# Patient Record
Sex: FEMALE | Race: WHITE | NOT HISPANIC OR LATINO | Employment: FULL TIME | URBAN - METROPOLITAN AREA
[De-identification: names, ages, dates, MRNs, and addresses within clinical notes are randomized per-mention and may not be internally consistent; named-entity substitution may affect disease eponyms.]

---

## 2017-01-10 ENCOUNTER — HOSPITAL ENCOUNTER (OUTPATIENT)
Dept: MAMMOGRAPHY | Facility: CLINIC | Age: 62
Discharge: HOME | End: 2017-01-10
Attending: INTERNAL MEDICINE

## 2017-01-10 ENCOUNTER — HOSPITAL ENCOUNTER (OUTPATIENT)
Dept: CT IMAGING | Facility: CLINIC | Age: 62
Discharge: HOME | End: 2017-01-10
Attending: INTERNAL MEDICINE

## 2017-01-10 DIAGNOSIS — Z12.2 ENCOUNTER FOR SCREENING FOR LUNG CANCER: ICD-10-CM

## 2017-01-10 DIAGNOSIS — Z12.31 VISIT FOR SCREENING MAMMOGRAM: ICD-10-CM

## 2017-01-10 DIAGNOSIS — Z72.0 TOBACCO ABUSE: ICD-10-CM

## 2017-01-11 ENCOUNTER — AMBULATORY - HEALTHEAST (OUTPATIENT)
Dept: INTERNAL MEDICINE | Facility: CLINIC | Age: 62
End: 2017-01-11

## 2017-01-11 DIAGNOSIS — R91.1 PULMONARY NODULE: ICD-10-CM

## 2017-03-16 ENCOUNTER — COMMUNICATION - HEALTHEAST (OUTPATIENT)
Dept: INTERNAL MEDICINE | Facility: CLINIC | Age: 62
End: 2017-03-16

## 2017-05-17 ENCOUNTER — COMMUNICATION - HEALTHEAST (OUTPATIENT)
Dept: INTERNAL MEDICINE | Facility: CLINIC | Age: 62
End: 2017-05-17

## 2017-05-31 ENCOUNTER — OFFICE VISIT - HEALTHEAST (OUTPATIENT)
Dept: INTERNAL MEDICINE | Facility: CLINIC | Age: 62
End: 2017-05-31

## 2017-05-31 DIAGNOSIS — H91.90 DEAF, UNSPECIFIED LATERALITY: ICD-10-CM

## 2017-05-31 DIAGNOSIS — R91.1 LUNG NODULE, SOLITARY: ICD-10-CM

## 2017-05-31 DIAGNOSIS — R19.7 DIARRHEA, UNSPECIFIED TYPE: ICD-10-CM

## 2017-05-31 DIAGNOSIS — J41.0 SIMPLE CHRONIC BRONCHITIS (H): ICD-10-CM

## 2017-05-31 DIAGNOSIS — Z72.0 TOBACCO ABUSE: ICD-10-CM

## 2017-05-31 ASSESSMENT — MIFFLIN-ST. JEOR: SCORE: 1118.75

## 2017-06-06 ENCOUNTER — HOSPITAL ENCOUNTER (OUTPATIENT)
Dept: CT IMAGING | Facility: CLINIC | Age: 62
Discharge: HOME | End: 2017-06-06
Attending: INTERNAL MEDICINE

## 2017-06-06 DIAGNOSIS — R91.1 LUNG NODULE, SOLITARY: ICD-10-CM

## 2017-06-07 ENCOUNTER — COMMUNICATION - HEALTHEAST (OUTPATIENT)
Dept: INTERNAL MEDICINE | Facility: CLINIC | Age: 62
End: 2017-06-07

## 2017-06-12 ENCOUNTER — COMMUNICATION - HEALTHEAST (OUTPATIENT)
Dept: INTERNAL MEDICINE | Facility: CLINIC | Age: 62
End: 2017-06-12

## 2017-06-26 ENCOUNTER — OFFICE VISIT - HEALTHEAST (OUTPATIENT)
Dept: PULMONOLOGY | Facility: OTHER | Age: 62
End: 2017-06-26

## 2017-06-26 DIAGNOSIS — R06.00 DYSPNEA, UNSPECIFIED TYPE: ICD-10-CM

## 2017-06-26 DIAGNOSIS — R91.1 LUNG NODULE: ICD-10-CM

## 2017-06-26 ASSESSMENT — MIFFLIN-ST. JEOR: SCORE: 1132.81

## 2017-10-17 ENCOUNTER — COMMUNICATION - HEALTHEAST (OUTPATIENT)
Dept: INTERNAL MEDICINE | Facility: CLINIC | Age: 62
End: 2017-10-17

## 2017-10-17 DIAGNOSIS — J30.9 ALLERGIC RHINITIS: ICD-10-CM

## 2018-05-22 ENCOUNTER — COMMUNICATION - HEALTHEAST (OUTPATIENT)
Dept: PULMONOLOGY | Facility: OTHER | Age: 63
End: 2018-05-22

## 2018-05-29 ENCOUNTER — COMMUNICATION - HEALTHEAST (OUTPATIENT)
Dept: INTERNAL MEDICINE | Facility: CLINIC | Age: 63
End: 2018-05-29

## 2018-05-29 ENCOUNTER — AMBULATORY - HEALTHEAST (OUTPATIENT)
Dept: INTERNAL MEDICINE | Facility: CLINIC | Age: 63
End: 2018-05-29

## 2018-05-29 ENCOUNTER — RECORDS - HEALTHEAST (OUTPATIENT)
Dept: ADMINISTRATIVE | Facility: OTHER | Age: 63
End: 2018-05-29

## 2018-05-29 ENCOUNTER — OFFICE VISIT - HEALTHEAST (OUTPATIENT)
Dept: INTERNAL MEDICINE | Facility: CLINIC | Age: 63
End: 2018-05-29

## 2018-05-29 DIAGNOSIS — E78.2 MIXED HYPERLIPIDEMIA: ICD-10-CM

## 2018-05-29 DIAGNOSIS — Z12.2 ENCOUNTER FOR SCREENING FOR LUNG CANCER: ICD-10-CM

## 2018-05-29 DIAGNOSIS — M85.80 OSTEOPENIA: ICD-10-CM

## 2018-05-29 DIAGNOSIS — H91.90 DEAFNESS, UNSPECIFIED LATERALITY: ICD-10-CM

## 2018-05-29 DIAGNOSIS — R42 VERTIGO: ICD-10-CM

## 2018-05-29 DIAGNOSIS — R91.8 LUNG NODULES: ICD-10-CM

## 2018-05-29 DIAGNOSIS — Z12.11 SCREEN FOR COLON CANCER: ICD-10-CM

## 2018-05-29 DIAGNOSIS — Z12.31 VISIT FOR SCREENING MAMMOGRAM: ICD-10-CM

## 2018-05-29 DIAGNOSIS — Z72.0 TOBACCO ABUSE: ICD-10-CM

## 2018-05-29 DIAGNOSIS — Z13.1 SCREENING FOR DIABETES MELLITUS: ICD-10-CM

## 2018-05-29 DIAGNOSIS — Z13.220 SCREENING FOR HYPERLIPIDEMIA: ICD-10-CM

## 2018-05-29 DIAGNOSIS — I10 ESSENTIAL HYPERTENSION: ICD-10-CM

## 2018-05-29 DIAGNOSIS — Z00.00 ROUTINE GENERAL MEDICAL EXAMINATION AT A HEALTH CARE FACILITY: ICD-10-CM

## 2018-05-29 LAB
CHOLEST SERPL-MCNC: 243 MG/DL
ERYTHROCYTE [DISTWIDTH] IN BLOOD BY AUTOMATED COUNT: 11.2 % (ref 11–14.5)
FASTING STATUS PATIENT QL REPORTED: YES
FASTING STATUS PATIENT QL REPORTED: YES
GLUCOSE BLD-MCNC: 90 MG/DL (ref 70–125)
HCT VFR BLD AUTO: 40.5 % (ref 35–47)
HDLC SERPL-MCNC: 79 MG/DL
HGB BLD-MCNC: 13.4 G/DL (ref 12–16)
LDLC SERPL CALC-MCNC: 149 MG/DL
MCH RBC QN AUTO: 28.7 PG (ref 27–34)
MCHC RBC AUTO-ENTMCNC: 33.2 G/DL (ref 32–36)
MCV RBC AUTO: 86 FL (ref 80–100)
PLATELET # BLD AUTO: 300 THOU/UL (ref 140–440)
PMV BLD AUTO: 7 FL (ref 7–10)
RBC # BLD AUTO: 4.68 MILL/UL (ref 3.8–5.4)
TRIGL SERPL-MCNC: 77 MG/DL
WBC: 11.5 THOU/UL (ref 4–11)

## 2018-05-29 ASSESSMENT — MIFFLIN-ST. JEOR: SCORE: 1105.14

## 2018-05-30 ENCOUNTER — COMMUNICATION - HEALTHEAST (OUTPATIENT)
Dept: INTERNAL MEDICINE | Facility: CLINIC | Age: 63
End: 2018-05-30

## 2018-05-30 LAB
25(OH)D3 SERPL-MCNC: 13.5 NG/ML (ref 30–80)
25(OH)D3 SERPL-MCNC: 13.5 NG/ML (ref 30–80)

## 2018-06-04 ENCOUNTER — COMMUNICATION - HEALTHEAST (OUTPATIENT)
Dept: INTERNAL MEDICINE | Facility: CLINIC | Age: 63
End: 2018-06-04

## 2018-06-04 ENCOUNTER — HOSPITAL ENCOUNTER (OUTPATIENT)
Dept: MAMMOGRAPHY | Facility: CLINIC | Age: 63
Discharge: HOME | End: 2018-06-04
Attending: INTERNAL MEDICINE

## 2018-06-04 DIAGNOSIS — Z12.31 VISIT FOR SCREENING MAMMOGRAM: ICD-10-CM

## 2018-06-20 ENCOUNTER — COMMUNICATION - HEALTHEAST (OUTPATIENT)
Dept: INTERNAL MEDICINE | Facility: CLINIC | Age: 63
End: 2018-06-20

## 2018-06-22 ENCOUNTER — COMMUNICATION - HEALTHEAST (OUTPATIENT)
Dept: INTERNAL MEDICINE | Facility: CLINIC | Age: 63
End: 2018-06-22

## 2018-06-29 ENCOUNTER — COMMUNICATION - HEALTHEAST (OUTPATIENT)
Dept: INTERNAL MEDICINE | Facility: CLINIC | Age: 63
End: 2018-06-29

## 2018-07-09 ENCOUNTER — COMMUNICATION - HEALTHEAST (OUTPATIENT)
Dept: SCHEDULING | Facility: CLINIC | Age: 63
End: 2018-07-09

## 2018-07-12 ENCOUNTER — AMBULATORY - HEALTHEAST (OUTPATIENT)
Dept: PULMONOLOGY | Facility: OTHER | Age: 63
End: 2018-07-12

## 2018-07-12 DIAGNOSIS — R91.8 PULMONARY NODULES: ICD-10-CM

## 2018-07-13 ENCOUNTER — COMMUNICATION - HEALTHEAST (OUTPATIENT)
Dept: PULMONOLOGY | Facility: OTHER | Age: 63
End: 2018-07-13

## 2018-07-13 ENCOUNTER — OFFICE VISIT - HEALTHEAST (OUTPATIENT)
Dept: INTERNAL MEDICINE | Facility: CLINIC | Age: 63
End: 2018-07-13

## 2018-07-13 DIAGNOSIS — M81.0 AGE-RELATED OSTEOPOROSIS WITHOUT CURRENT PATHOLOGICAL FRACTURE: ICD-10-CM

## 2018-07-13 DIAGNOSIS — E55.9 VITAMIN D DEFICIENCY: ICD-10-CM

## 2018-07-13 DIAGNOSIS — R19.7 DIARRHEA: ICD-10-CM

## 2018-07-13 DIAGNOSIS — Z72.0 TOBACCO ABUSE: ICD-10-CM

## 2018-07-13 ASSESSMENT — MIFFLIN-ST. JEOR: SCORE: 1091.53

## 2018-07-16 ENCOUNTER — AMBULATORY - HEALTHEAST (OUTPATIENT)
Dept: LAB | Facility: CLINIC | Age: 63
End: 2018-07-16

## 2018-07-16 DIAGNOSIS — R19.7 DIARRHEA: ICD-10-CM

## 2018-07-16 LAB
C DIFF TOX B STL QL: POSITIVE
RIBOTYPE 027/NAP1/BI: ABNORMAL

## 2018-07-17 ENCOUNTER — COMMUNICATION - HEALTHEAST (OUTPATIENT)
Dept: INTERNAL MEDICINE | Facility: CLINIC | Age: 63
End: 2018-07-17

## 2018-08-06 ENCOUNTER — COMMUNICATION - HEALTHEAST (OUTPATIENT)
Dept: INTERNAL MEDICINE | Facility: CLINIC | Age: 63
End: 2018-08-06

## 2018-08-07 ENCOUNTER — COMMUNICATION - HEALTHEAST (OUTPATIENT)
Dept: INTERNAL MEDICINE | Facility: CLINIC | Age: 63
End: 2018-08-07

## 2018-08-24 ENCOUNTER — COMMUNICATION - HEALTHEAST (OUTPATIENT)
Dept: INTERNAL MEDICINE | Facility: CLINIC | Age: 63
End: 2018-08-24

## 2018-08-30 ENCOUNTER — COMMUNICATION - HEALTHEAST (OUTPATIENT)
Dept: INTERNAL MEDICINE | Facility: CLINIC | Age: 63
End: 2018-08-30

## 2018-08-31 ENCOUNTER — COMMUNICATION - HEALTHEAST (OUTPATIENT)
Dept: SCHEDULING | Facility: CLINIC | Age: 63
End: 2018-08-31

## 2018-08-31 ENCOUNTER — COMMUNICATION - HEALTHEAST (OUTPATIENT)
Dept: INTERNAL MEDICINE | Facility: CLINIC | Age: 63
End: 2018-08-31

## 2018-09-04 ENCOUNTER — OFFICE VISIT - HEALTHEAST (OUTPATIENT)
Dept: INTERNAL MEDICINE | Facility: CLINIC | Age: 63
End: 2018-09-04

## 2018-09-04 DIAGNOSIS — J41.0 SIMPLE CHRONIC BRONCHITIS (H): ICD-10-CM

## 2018-09-04 DIAGNOSIS — A04.72 C. DIFFICILE DIARRHEA: ICD-10-CM

## 2018-09-04 DIAGNOSIS — Z72.0 TOBACCO ABUSE: ICD-10-CM

## 2018-09-04 ASSESSMENT — MIFFLIN-ST. JEOR: SCORE: 1096.07

## 2018-10-03 ENCOUNTER — COMMUNICATION - HEALTHEAST (OUTPATIENT)
Dept: PULMONOLOGY | Facility: OTHER | Age: 63
End: 2018-10-03

## 2018-10-31 ENCOUNTER — AMBULATORY - HEALTHEAST (OUTPATIENT)
Dept: LAB | Facility: CLINIC | Age: 63
End: 2018-10-31

## 2018-10-31 ENCOUNTER — COMMUNICATION - HEALTHEAST (OUTPATIENT)
Dept: INTERNAL MEDICINE | Facility: CLINIC | Age: 63
End: 2018-10-31

## 2018-10-31 DIAGNOSIS — A04.72 C. DIFFICILE DIARRHEA: ICD-10-CM

## 2018-10-31 LAB
C DIFF TOX B STL QL: POSITIVE
RIBOTYPE 027/NAP1/BI: ABNORMAL

## 2018-11-01 ENCOUNTER — AMBULATORY - HEALTHEAST (OUTPATIENT)
Dept: INTERNAL MEDICINE | Facility: CLINIC | Age: 63
End: 2018-11-01

## 2018-11-01 DIAGNOSIS — A04.71 RECURRENT CLOSTRIDIUM DIFFICILE DIARRHEA: ICD-10-CM

## 2018-12-04 ENCOUNTER — OFFICE VISIT - HEALTHEAST (OUTPATIENT)
Dept: INTERNAL MEDICINE | Facility: CLINIC | Age: 63
End: 2018-12-04

## 2018-12-04 DIAGNOSIS — Z12.11 SCREENING FOR COLON CANCER: ICD-10-CM

## 2018-12-04 DIAGNOSIS — Z72.0 TOBACCO ABUSE: ICD-10-CM

## 2018-12-04 DIAGNOSIS — A04.71 RECURRENT CLOSTRIDIUM DIFFICILE DIARRHEA: ICD-10-CM

## 2018-12-04 ASSESSMENT — MIFFLIN-ST. JEOR: SCORE: 1105.14

## 2018-12-07 ENCOUNTER — COMMUNICATION - HEALTHEAST (OUTPATIENT)
Dept: INTERNAL MEDICINE | Facility: CLINIC | Age: 63
End: 2018-12-07

## 2018-12-31 ENCOUNTER — COMMUNICATION - HEALTHEAST (OUTPATIENT)
Dept: INTERNAL MEDICINE | Facility: CLINIC | Age: 63
End: 2018-12-31

## 2019-01-03 ENCOUNTER — COMMUNICATION - HEALTHEAST (OUTPATIENT)
Dept: INTERNAL MEDICINE | Facility: CLINIC | Age: 64
End: 2019-01-03

## 2019-01-15 ENCOUNTER — OFFICE VISIT - HEALTHEAST (OUTPATIENT)
Dept: INTERNAL MEDICINE | Facility: CLINIC | Age: 64
End: 2019-01-15

## 2019-01-15 DIAGNOSIS — E55.9 VITAMIN D DEFICIENCY: ICD-10-CM

## 2019-01-15 DIAGNOSIS — A04.71 RECURRENT CLOSTRIDIUM DIFFICILE DIARRHEA: ICD-10-CM

## 2019-01-15 DIAGNOSIS — M81.0 AGE-RELATED OSTEOPOROSIS WITHOUT CURRENT PATHOLOGICAL FRACTURE: ICD-10-CM

## 2019-01-15 DIAGNOSIS — I10 ESSENTIAL HYPERTENSION: ICD-10-CM

## 2019-01-15 DIAGNOSIS — Z72.0 TOBACCO ABUSE: ICD-10-CM

## 2019-01-15 ASSESSMENT — MIFFLIN-ST. JEOR: SCORE: 1114.21

## 2019-03-20 ENCOUNTER — COMMUNICATION - HEALTHEAST (OUTPATIENT)
Dept: SCHEDULING | Facility: CLINIC | Age: 64
End: 2019-03-20

## 2019-03-26 ENCOUNTER — OFFICE VISIT - HEALTHEAST (OUTPATIENT)
Dept: INTERNAL MEDICINE | Facility: CLINIC | Age: 64
End: 2019-03-26

## 2019-03-26 DIAGNOSIS — Z72.0 TOBACCO ABUSE: ICD-10-CM

## 2019-03-26 DIAGNOSIS — R91.8 LUNG NODULES: ICD-10-CM

## 2019-03-26 DIAGNOSIS — I10 ESSENTIAL HYPERTENSION: ICD-10-CM

## 2019-03-26 DIAGNOSIS — R42 VERTIGO: ICD-10-CM

## 2019-03-26 DIAGNOSIS — J30.9 ALLERGIC RHINITIS: ICD-10-CM

## 2019-03-26 DIAGNOSIS — M81.0 AGE-RELATED OSTEOPOROSIS WITHOUT CURRENT PATHOLOGICAL FRACTURE: ICD-10-CM

## 2019-03-26 DIAGNOSIS — H91.90 DEAFNESS, UNSPECIFIED LATERALITY: ICD-10-CM

## 2019-03-26 DIAGNOSIS — Z86.69 HISTORY OF MENIERE'S DISEASE: ICD-10-CM

## 2019-03-26 DIAGNOSIS — E78.2 MIXED HYPERLIPIDEMIA: ICD-10-CM

## 2019-03-26 LAB
ALBUMIN SERPL-MCNC: 3.5 G/DL (ref 3.5–5)
ALP SERPL-CCNC: 62 U/L (ref 45–120)
ALT SERPL W P-5'-P-CCNC: 16 U/L (ref 0–45)
ANION GAP SERPL CALCULATED.3IONS-SCNC: 12 MMOL/L (ref 5–18)
AST SERPL W P-5'-P-CCNC: 25 U/L (ref 0–40)
BILIRUB SERPL-MCNC: 0.2 MG/DL (ref 0–1)
BUN SERPL-MCNC: 14 MG/DL (ref 8–22)
CALCIUM SERPL-MCNC: 8.7 MG/DL (ref 8.5–10.5)
CHLORIDE BLD-SCNC: 103 MMOL/L (ref 98–107)
CO2 SERPL-SCNC: 22 MMOL/L (ref 22–31)
CREAT SERPL-MCNC: 0.75 MG/DL (ref 0.6–1.1)
ERYTHROCYTE [DISTWIDTH] IN BLOOD BY AUTOMATED COUNT: 12.2 % (ref 11–14.5)
GFR SERPL CREATININE-BSD FRML MDRD: >60 ML/MIN/1.73M2
GLUCOSE BLD-MCNC: 91 MG/DL (ref 70–125)
HCT VFR BLD AUTO: 37.6 % (ref 35–47)
HGB BLD-MCNC: 12.7 G/DL (ref 12–16)
MCH RBC QN AUTO: 28.1 PG (ref 27–34)
MCHC RBC AUTO-ENTMCNC: 33.8 G/DL (ref 32–36)
MCV RBC AUTO: 83 FL (ref 80–100)
PLATELET # BLD AUTO: 259 THOU/UL (ref 140–440)
PMV BLD AUTO: 7.4 FL (ref 7–10)
POTASSIUM BLD-SCNC: 4.2 MMOL/L (ref 3.5–5)
PROT SERPL-MCNC: 6.7 G/DL (ref 6–8)
RBC # BLD AUTO: 4.52 MILL/UL (ref 3.8–5.4)
SODIUM SERPL-SCNC: 137 MMOL/L (ref 136–145)
TSH SERPL DL<=0.005 MIU/L-ACNC: 0.78 UIU/ML (ref 0.3–5)
WBC: 8.9 THOU/UL (ref 4–11)

## 2019-03-26 ASSESSMENT — MIFFLIN-ST. JEOR: SCORE: 1114.21

## 2019-05-13 ENCOUNTER — COMMUNICATION - HEALTHEAST (OUTPATIENT)
Dept: INTERNAL MEDICINE | Facility: CLINIC | Age: 64
End: 2019-05-13

## 2019-07-31 ENCOUNTER — OFFICE VISIT - HEALTHEAST (OUTPATIENT)
Dept: INTERNAL MEDICINE | Facility: CLINIC | Age: 64
End: 2019-07-31

## 2019-07-31 DIAGNOSIS — Z86.69 HISTORY OF MENIERE'S DISEASE: ICD-10-CM

## 2019-07-31 DIAGNOSIS — Z12.31 VISIT FOR SCREENING MAMMOGRAM: ICD-10-CM

## 2019-07-31 DIAGNOSIS — R42 VERTIGO: ICD-10-CM

## 2019-07-31 DIAGNOSIS — H91.90 DEAFNESS, UNSPECIFIED LATERALITY: ICD-10-CM

## 2019-07-31 ASSESSMENT — MIFFLIN-ST. JEOR: SCORE: 1105.14

## 2019-08-15 ENCOUNTER — COMMUNICATION - HEALTHEAST (OUTPATIENT)
Dept: INTERNAL MEDICINE | Facility: CLINIC | Age: 64
End: 2019-08-15

## 2019-08-21 ENCOUNTER — RECORDS - HEALTHEAST (OUTPATIENT)
Dept: ADMINISTRATIVE | Facility: OTHER | Age: 64
End: 2019-08-21

## 2019-08-23 ENCOUNTER — COMMUNICATION - HEALTHEAST (OUTPATIENT)
Dept: INTERNAL MEDICINE | Facility: CLINIC | Age: 64
End: 2019-08-23

## 2019-08-23 DIAGNOSIS — J30.9 ALLERGIC RHINITIS, UNSPECIFIED SEASONALITY, UNSPECIFIED TRIGGER: ICD-10-CM

## 2019-08-26 ENCOUNTER — HOSPITAL ENCOUNTER (OUTPATIENT)
Dept: MAMMOGRAPHY | Facility: CLINIC | Age: 64
Discharge: HOME | End: 2019-08-26
Attending: INTERNAL MEDICINE

## 2019-08-26 DIAGNOSIS — Z12.31 VISIT FOR SCREENING MAMMOGRAM: ICD-10-CM

## 2019-08-28 ENCOUNTER — COMMUNICATION - HEALTHEAST (OUTPATIENT)
Dept: INTERNAL MEDICINE | Facility: CLINIC | Age: 64
End: 2019-08-28

## 2019-09-03 ENCOUNTER — OFFICE VISIT - HEALTHEAST (OUTPATIENT)
Dept: INTERNAL MEDICINE | Facility: CLINIC | Age: 64
End: 2019-09-03

## 2019-09-03 ENCOUNTER — AMBULATORY - HEALTHEAST (OUTPATIENT)
Dept: INTERNAL MEDICINE | Facility: CLINIC | Age: 64
End: 2019-09-03

## 2019-09-03 DIAGNOSIS — Z72.0 TOBACCO ABUSE: ICD-10-CM

## 2019-09-03 DIAGNOSIS — M81.0 AGE-RELATED OSTEOPOROSIS WITHOUT CURRENT PATHOLOGICAL FRACTURE: ICD-10-CM

## 2019-09-03 DIAGNOSIS — J30.9 ALLERGIC RHINITIS, UNSPECIFIED SEASONALITY, UNSPECIFIED TRIGGER: ICD-10-CM

## 2019-09-03 DIAGNOSIS — H91.90 DEAFNESS, UNSPECIFIED LATERALITY: ICD-10-CM

## 2019-09-03 DIAGNOSIS — E78.2 MIXED HYPERLIPIDEMIA: ICD-10-CM

## 2019-09-03 DIAGNOSIS — Z71.89 ADVANCED CARE PLANNING/COUNSELING DISCUSSION: ICD-10-CM

## 2019-09-03 DIAGNOSIS — R91.8 LUNG NODULES: ICD-10-CM

## 2019-09-03 DIAGNOSIS — J41.0 SIMPLE CHRONIC BRONCHITIS (H): ICD-10-CM

## 2019-09-03 DIAGNOSIS — I10 ESSENTIAL HYPERTENSION: ICD-10-CM

## 2019-09-03 DIAGNOSIS — Z00.00 ROUTINE GENERAL MEDICAL EXAMINATION AT A HEALTH CARE FACILITY: ICD-10-CM

## 2019-09-03 LAB
CHOLEST SERPL-MCNC: 250 MG/DL
CREAT SERPL-MCNC: 0.7 MG/DL (ref 0.6–1.1)
FASTING STATUS PATIENT QL REPORTED: YES
GFR SERPL CREATININE-BSD FRML MDRD: >60 ML/MIN/1.73M2
HDLC SERPL-MCNC: 75 MG/DL
LDLC SERPL CALC-MCNC: 161 MG/DL
POTASSIUM BLD-SCNC: 3.8 MMOL/L (ref 3.5–5)
TRIGL SERPL-MCNC: 71 MG/DL

## 2019-09-03 ASSESSMENT — MIFFLIN-ST. JEOR: SCORE: 1093.8

## 2019-09-05 ENCOUNTER — COMMUNICATION - HEALTHEAST (OUTPATIENT)
Dept: INTERNAL MEDICINE | Facility: CLINIC | Age: 64
End: 2019-09-05

## 2019-09-18 ENCOUNTER — OFFICE VISIT - HEALTHEAST (OUTPATIENT)
Dept: INTERNAL MEDICINE | Facility: CLINIC | Age: 64
End: 2019-09-18

## 2019-09-18 DIAGNOSIS — M81.0 AGE-RELATED OSTEOPOROSIS WITHOUT CURRENT PATHOLOGICAL FRACTURE: ICD-10-CM

## 2019-09-18 DIAGNOSIS — E78.2 MIXED HYPERLIPIDEMIA: ICD-10-CM

## 2019-09-18 DIAGNOSIS — I10 ESSENTIAL HYPERTENSION: ICD-10-CM

## 2019-09-18 DIAGNOSIS — E55.9 VITAMIN D DEFICIENCY: ICD-10-CM

## 2019-09-18 DIAGNOSIS — J30.9 ALLERGIC RHINITIS, UNSPECIFIED SEASONALITY, UNSPECIFIED TRIGGER: ICD-10-CM

## 2019-09-18 DIAGNOSIS — Z72.0 TOBACCO ABUSE: ICD-10-CM

## 2019-09-18 LAB
CREAT SERPL-MCNC: 0.7 MG/DL (ref 0.6–1.1)
GFR SERPL CREATININE-BSD FRML MDRD: >60 ML/MIN/1.73M2
POTASSIUM BLD-SCNC: 4.6 MMOL/L (ref 3.5–5)

## 2019-09-18 ASSESSMENT — MIFFLIN-ST. JEOR: SCORE: 1102.88

## 2019-09-30 ENCOUNTER — COMMUNICATION - HEALTHEAST (OUTPATIENT)
Dept: INTERNAL MEDICINE | Facility: CLINIC | Age: 64
End: 2019-09-30

## 2019-09-30 DIAGNOSIS — J32.9 SINUSITIS, UNSPECIFIED CHRONICITY, UNSPECIFIED LOCATION: ICD-10-CM

## 2019-10-23 ENCOUNTER — COMMUNICATION - HEALTHEAST (OUTPATIENT)
Dept: INTERNAL MEDICINE | Facility: CLINIC | Age: 64
End: 2019-10-23

## 2019-10-23 DIAGNOSIS — R05.9 COUGH: ICD-10-CM

## 2019-10-28 ENCOUNTER — COMMUNICATION - HEALTHEAST (OUTPATIENT)
Dept: SCHEDULING | Facility: CLINIC | Age: 64
End: 2019-10-28

## 2019-10-31 ENCOUNTER — COMMUNICATION - HEALTHEAST (OUTPATIENT)
Dept: INTERNAL MEDICINE | Facility: CLINIC | Age: 64
End: 2019-10-31

## 2019-11-07 ENCOUNTER — OFFICE VISIT - HEALTHEAST (OUTPATIENT)
Dept: INTERNAL MEDICINE | Facility: CLINIC | Age: 64
End: 2019-11-07

## 2019-11-07 DIAGNOSIS — Z23 NEED FOR PROPHYLACTIC VACCINATION AND INOCULATION AGAINST INFLUENZA: ICD-10-CM

## 2019-11-07 DIAGNOSIS — R05.9 COUGH: ICD-10-CM

## 2019-11-07 DIAGNOSIS — R91.8 LUNG NODULES: ICD-10-CM

## 2019-11-07 DIAGNOSIS — Z72.0 TOBACCO ABUSE: ICD-10-CM

## 2019-11-07 DIAGNOSIS — J41.0 SIMPLE CHRONIC BRONCHITIS (H): ICD-10-CM

## 2019-11-07 DIAGNOSIS — K21.00 GASTROESOPHAGEAL REFLUX DISEASE WITH ESOPHAGITIS: ICD-10-CM

## 2019-11-07 DIAGNOSIS — R42 VERTIGO: ICD-10-CM

## 2019-11-07 ASSESSMENT — MIFFLIN-ST. JEOR: SCORE: 1093.8

## 2019-11-20 ENCOUNTER — COMMUNICATION - HEALTHEAST (OUTPATIENT)
Dept: INTERNAL MEDICINE | Facility: CLINIC | Age: 64
End: 2019-11-20

## 2019-11-22 ENCOUNTER — OFFICE VISIT - HEALTHEAST (OUTPATIENT)
Dept: OCCUPATIONAL THERAPY | Facility: REHABILITATION | Age: 64
End: 2019-11-22

## 2019-11-22 DIAGNOSIS — R42 DIZZINESS: ICD-10-CM

## 2019-11-22 DIAGNOSIS — R26.81 UNSTEADINESS ON FEET: ICD-10-CM

## 2019-11-22 DIAGNOSIS — Z78.9 DECREASED ACTIVITIES OF DAILY LIVING (ADL): ICD-10-CM

## 2019-12-03 ENCOUNTER — OFFICE VISIT - HEALTHEAST (OUTPATIENT)
Dept: OCCUPATIONAL THERAPY | Facility: REHABILITATION | Age: 64
End: 2019-12-03

## 2019-12-03 DIAGNOSIS — R26.81 UNSTEADINESS ON FEET: ICD-10-CM

## 2019-12-03 DIAGNOSIS — Z78.9 DECREASED ACTIVITIES OF DAILY LIVING (ADL): ICD-10-CM

## 2019-12-03 DIAGNOSIS — R42 DIZZINESS: ICD-10-CM

## 2019-12-11 ENCOUNTER — OFFICE VISIT - HEALTHEAST (OUTPATIENT)
Dept: INTERNAL MEDICINE | Facility: CLINIC | Age: 64
End: 2019-12-11

## 2019-12-11 DIAGNOSIS — J41.0 SIMPLE CHRONIC BRONCHITIS (H): ICD-10-CM

## 2019-12-11 DIAGNOSIS — R91.8 LUNG NODULES: ICD-10-CM

## 2019-12-11 DIAGNOSIS — R05.3 CHRONIC COUGH: ICD-10-CM

## 2019-12-11 DIAGNOSIS — Z72.0 TOBACCO ABUSE: ICD-10-CM

## 2019-12-11 ASSESSMENT — MIFFLIN-ST. JEOR: SCORE: 1089.27

## 2019-12-17 ENCOUNTER — OFFICE VISIT - HEALTHEAST (OUTPATIENT)
Dept: OCCUPATIONAL THERAPY | Facility: REHABILITATION | Age: 64
End: 2019-12-17

## 2019-12-17 DIAGNOSIS — R42 DIZZINESS: ICD-10-CM

## 2019-12-17 DIAGNOSIS — R26.81 UNSTEADINESS ON FEET: ICD-10-CM

## 2019-12-17 DIAGNOSIS — Z78.9 DECREASED ACTIVITIES OF DAILY LIVING (ADL): ICD-10-CM

## 2020-03-03 ENCOUNTER — COMMUNICATION - HEALTHEAST (OUTPATIENT)
Dept: INTERNAL MEDICINE | Facility: CLINIC | Age: 65
End: 2020-03-03

## 2020-03-03 ENCOUNTER — COMMUNICATION - HEALTHEAST (OUTPATIENT)
Dept: SCHEDULING | Facility: CLINIC | Age: 65
End: 2020-03-03

## 2020-03-04 ENCOUNTER — COMMUNICATION - HEALTHEAST (OUTPATIENT)
Dept: SCHEDULING | Facility: CLINIC | Age: 65
End: 2020-03-04

## 2020-03-04 ENCOUNTER — COMMUNICATION - HEALTHEAST (OUTPATIENT)
Dept: INTERNAL MEDICINE | Facility: CLINIC | Age: 65
End: 2020-03-04

## 2020-03-17 ENCOUNTER — COMMUNICATION - HEALTHEAST (OUTPATIENT)
Dept: INTERNAL MEDICINE | Facility: CLINIC | Age: 65
End: 2020-03-17

## 2020-03-19 ENCOUNTER — OFFICE VISIT - HEALTHEAST (OUTPATIENT)
Dept: INTERNAL MEDICINE | Facility: CLINIC | Age: 65
End: 2020-03-19

## 2020-03-19 DIAGNOSIS — J41.0 SIMPLE CHRONIC BRONCHITIS (H): ICD-10-CM

## 2020-03-19 DIAGNOSIS — K21.00 GASTROESOPHAGEAL REFLUX DISEASE WITH ESOPHAGITIS: ICD-10-CM

## 2020-03-19 DIAGNOSIS — J32.9 SINUSITIS, UNSPECIFIED CHRONICITY, UNSPECIFIED LOCATION: ICD-10-CM

## 2020-03-19 DIAGNOSIS — Z72.0 TOBACCO ABUSE: ICD-10-CM

## 2020-03-19 DIAGNOSIS — R05.3 CHRONIC COUGH: ICD-10-CM

## 2020-05-13 ENCOUNTER — COMMUNICATION - HEALTHEAST (OUTPATIENT)
Dept: INTERNAL MEDICINE | Facility: CLINIC | Age: 65
End: 2020-05-13

## 2020-05-14 ENCOUNTER — COMMUNICATION - HEALTHEAST (OUTPATIENT)
Dept: INTERNAL MEDICINE | Facility: CLINIC | Age: 65
End: 2020-05-14

## 2020-05-14 DIAGNOSIS — M81.0 AGE-RELATED OSTEOPOROSIS WITHOUT CURRENT PATHOLOGICAL FRACTURE: ICD-10-CM

## 2020-05-15 ENCOUNTER — COMMUNICATION - HEALTHEAST (OUTPATIENT)
Dept: INTERNAL MEDICINE | Facility: CLINIC | Age: 65
End: 2020-05-15

## 2020-05-15 DIAGNOSIS — J30.9 ALLERGIC RHINITIS: ICD-10-CM

## 2020-05-15 DIAGNOSIS — E78.2 MIXED HYPERLIPIDEMIA: ICD-10-CM

## 2020-05-19 ENCOUNTER — COMMUNICATION - HEALTHEAST (OUTPATIENT)
Dept: INTERNAL MEDICINE | Facility: CLINIC | Age: 65
End: 2020-05-19

## 2020-05-19 DIAGNOSIS — J30.9 ALLERGIC RHINITIS, UNSPECIFIED SEASONALITY, UNSPECIFIED TRIGGER: ICD-10-CM

## 2020-06-24 ENCOUNTER — OFFICE VISIT - HEALTHEAST (OUTPATIENT)
Dept: INTERNAL MEDICINE | Facility: CLINIC | Age: 65
End: 2020-06-24

## 2020-06-24 DIAGNOSIS — Z72.0 TOBACCO ABUSE: ICD-10-CM

## 2020-06-24 DIAGNOSIS — R42 CHRONIC VERTIGO: ICD-10-CM

## 2020-06-24 DIAGNOSIS — K21.9 GASTROESOPHAGEAL REFLUX DISEASE WITHOUT ESOPHAGITIS: ICD-10-CM

## 2020-06-24 DIAGNOSIS — H92.01 RIGHT EAR PAIN: ICD-10-CM

## 2020-06-24 DIAGNOSIS — H91.90 DEAFNESS, UNSPECIFIED LATERALITY: ICD-10-CM

## 2020-06-24 DIAGNOSIS — I10 ESSENTIAL HYPERTENSION: ICD-10-CM

## 2020-06-24 DIAGNOSIS — R91.8 LUNG NODULES: ICD-10-CM

## 2020-06-24 DIAGNOSIS — Z12.11 SCREEN FOR COLON CANCER: ICD-10-CM

## 2020-06-24 ASSESSMENT — MIFFLIN-ST. JEOR: SCORE: 1080.2

## 2020-06-30 ENCOUNTER — COMMUNICATION - HEALTHEAST (OUTPATIENT)
Dept: INTERNAL MEDICINE | Facility: CLINIC | Age: 65
End: 2020-06-30

## 2020-07-01 ENCOUNTER — COMMUNICATION - HEALTHEAST (OUTPATIENT)
Dept: INTERNAL MEDICINE | Facility: CLINIC | Age: 65
End: 2020-07-01

## 2020-07-01 DIAGNOSIS — K21.00 GASTROESOPHAGEAL REFLUX DISEASE WITH ESOPHAGITIS: ICD-10-CM

## 2020-08-04 ENCOUNTER — COMMUNICATION - HEALTHEAST (OUTPATIENT)
Dept: SCHEDULING | Facility: CLINIC | Age: 65
End: 2020-08-04

## 2020-08-06 ENCOUNTER — COMMUNICATION - HEALTHEAST (OUTPATIENT)
Dept: INTERNAL MEDICINE | Facility: CLINIC | Age: 65
End: 2020-08-06

## 2020-08-09 ENCOUNTER — COMMUNICATION - HEALTHEAST (OUTPATIENT)
Dept: INTERNAL MEDICINE | Facility: CLINIC | Age: 65
End: 2020-08-09

## 2020-08-09 DIAGNOSIS — I10 ESSENTIAL HYPERTENSION: ICD-10-CM

## 2020-08-10 ENCOUNTER — OFFICE VISIT - HEALTHEAST (OUTPATIENT)
Dept: INTERNAL MEDICINE | Facility: CLINIC | Age: 65
End: 2020-08-10

## 2020-08-10 DIAGNOSIS — J30.9 ALLERGIC RHINITIS, UNSPECIFIED SEASONALITY, UNSPECIFIED TRIGGER: ICD-10-CM

## 2020-08-10 DIAGNOSIS — H92.01 RIGHT EAR PAIN: ICD-10-CM

## 2020-08-10 DIAGNOSIS — R05.3 CHRONIC COUGH: ICD-10-CM

## 2020-08-10 DIAGNOSIS — K21.00 GASTROESOPHAGEAL REFLUX DISEASE WITH ESOPHAGITIS: ICD-10-CM

## 2020-08-10 DIAGNOSIS — Z12.11 COLON CANCER SCREENING: ICD-10-CM

## 2020-08-10 DIAGNOSIS — Z72.0 TOBACCO ABUSE: ICD-10-CM

## 2020-08-10 DIAGNOSIS — J32.9 SINUSITIS, UNSPECIFIED CHRONICITY, UNSPECIFIED LOCATION: ICD-10-CM

## 2020-08-10 DIAGNOSIS — R42 CHRONIC VERTIGO: ICD-10-CM

## 2020-08-10 DIAGNOSIS — F41.1 GENERALIZED ANXIETY DISORDER: ICD-10-CM

## 2020-08-11 ENCOUNTER — COMMUNICATION - HEALTHEAST (OUTPATIENT)
Dept: INTERNAL MEDICINE | Facility: CLINIC | Age: 65
End: 2020-08-11

## 2020-08-12 ENCOUNTER — COMMUNICATION - HEALTHEAST (OUTPATIENT)
Dept: INTERNAL MEDICINE | Facility: CLINIC | Age: 65
End: 2020-08-12

## 2020-08-12 ENCOUNTER — RECORDS - HEALTHEAST (OUTPATIENT)
Dept: ADMINISTRATIVE | Facility: OTHER | Age: 65
End: 2020-08-12

## 2020-08-12 ENCOUNTER — OFFICE VISIT - HEALTHEAST (OUTPATIENT)
Dept: OTOLARYNGOLOGY | Facility: CLINIC | Age: 65
End: 2020-08-12

## 2020-08-12 DIAGNOSIS — R26.89 IMBALANCE: ICD-10-CM

## 2020-08-13 ENCOUNTER — COMMUNICATION - HEALTHEAST (OUTPATIENT)
Dept: INTERNAL MEDICINE | Facility: CLINIC | Age: 65
End: 2020-08-13

## 2020-08-14 ENCOUNTER — COMMUNICATION - HEALTHEAST (OUTPATIENT)
Dept: INTERNAL MEDICINE | Facility: CLINIC | Age: 65
End: 2020-08-14

## 2020-08-25 ENCOUNTER — COMMUNICATION - HEALTHEAST (OUTPATIENT)
Dept: INTERNAL MEDICINE | Facility: CLINIC | Age: 65
End: 2020-08-25

## 2020-08-26 ENCOUNTER — COMMUNICATION - HEALTHEAST (OUTPATIENT)
Dept: INTERNAL MEDICINE | Facility: CLINIC | Age: 65
End: 2020-08-26

## 2020-09-02 ENCOUNTER — COMMUNICATION - HEALTHEAST (OUTPATIENT)
Dept: INTERNAL MEDICINE | Facility: CLINIC | Age: 65
End: 2020-09-02

## 2020-09-16 ENCOUNTER — COMMUNICATION - HEALTHEAST (OUTPATIENT)
Dept: INTERNAL MEDICINE | Facility: CLINIC | Age: 65
End: 2020-09-16

## 2020-09-22 ENCOUNTER — COMMUNICATION - HEALTHEAST (OUTPATIENT)
Dept: INTERNAL MEDICINE | Facility: CLINIC | Age: 65
End: 2020-09-22

## 2020-09-22 ENCOUNTER — OFFICE VISIT - HEALTHEAST (OUTPATIENT)
Dept: INTERNAL MEDICINE | Facility: CLINIC | Age: 65
End: 2020-09-22

## 2020-09-22 ENCOUNTER — AMBULATORY - HEALTHEAST (OUTPATIENT)
Dept: INTERNAL MEDICINE | Facility: CLINIC | Age: 65
End: 2020-09-22

## 2020-09-22 DIAGNOSIS — Z00.00 ROUTINE GENERAL MEDICAL EXAMINATION AT A HEALTH CARE FACILITY: ICD-10-CM

## 2020-09-22 DIAGNOSIS — M19.042 PRIMARY OSTEOARTHRITIS OF BOTH HANDS: ICD-10-CM

## 2020-09-22 DIAGNOSIS — K21.9 GASTROESOPHAGEAL REFLUX DISEASE WITHOUT ESOPHAGITIS: ICD-10-CM

## 2020-09-22 DIAGNOSIS — R42 VERTIGO: ICD-10-CM

## 2020-09-22 DIAGNOSIS — J41.0 SIMPLE CHRONIC BRONCHITIS (H): ICD-10-CM

## 2020-09-22 DIAGNOSIS — Z23 IMMUNIZATION DUE: ICD-10-CM

## 2020-09-22 DIAGNOSIS — I10 ESSENTIAL HYPERTENSION: ICD-10-CM

## 2020-09-22 DIAGNOSIS — M81.0 AGE-RELATED OSTEOPOROSIS WITHOUT CURRENT PATHOLOGICAL FRACTURE: ICD-10-CM

## 2020-09-22 DIAGNOSIS — E78.2 MIXED HYPERLIPIDEMIA: ICD-10-CM

## 2020-09-22 DIAGNOSIS — L98.9 SKIN LESION: ICD-10-CM

## 2020-09-22 DIAGNOSIS — R91.8 LUNG NODULES: ICD-10-CM

## 2020-09-22 DIAGNOSIS — F17.219 CIGARETTE NICOTINE DEPENDENCE WITH NICOTINE-INDUCED DISORDER: ICD-10-CM

## 2020-09-22 DIAGNOSIS — M19.041 PRIMARY OSTEOARTHRITIS OF BOTH HANDS: ICD-10-CM

## 2020-09-22 DIAGNOSIS — Z12.31 VISIT FOR SCREENING MAMMOGRAM: ICD-10-CM

## 2020-09-22 DIAGNOSIS — Z72.0 TOBACCO ABUSE: ICD-10-CM

## 2020-09-22 LAB
ALBUMIN SERPL-MCNC: 3.9 G/DL (ref 3.5–5)
ALP SERPL-CCNC: 56 U/L (ref 45–120)
ALT SERPL W P-5'-P-CCNC: 17 U/L (ref 0–45)
ANION GAP SERPL CALCULATED.3IONS-SCNC: 8 MMOL/L (ref 5–18)
AST SERPL W P-5'-P-CCNC: 22 U/L (ref 0–40)
BILIRUB SERPL-MCNC: 0.3 MG/DL (ref 0–1)
BUN SERPL-MCNC: 15 MG/DL (ref 8–22)
CALCIUM SERPL-MCNC: 9.3 MG/DL (ref 8.5–10.5)
CHLORIDE BLD-SCNC: 102 MMOL/L (ref 98–107)
CHOLEST SERPL-MCNC: 176 MG/DL
CO2 SERPL-SCNC: 28 MMOL/L (ref 22–31)
CREAT SERPL-MCNC: 0.79 MG/DL (ref 0.6–1.1)
ERYTHROCYTE [DISTWIDTH] IN BLOOD BY AUTOMATED COUNT: 12 % (ref 11–14.5)
FASTING STATUS PATIENT QL REPORTED: YES
GFR SERPL CREATININE-BSD FRML MDRD: >60 ML/MIN/1.73M2
GLUCOSE BLD-MCNC: 95 MG/DL (ref 70–125)
HCT VFR BLD AUTO: 39.1 % (ref 35–47)
HDLC SERPL-MCNC: 81 MG/DL
HGB BLD-MCNC: 12.9 G/DL (ref 12–16)
LDLC SERPL CALC-MCNC: 82 MG/DL
MCH RBC QN AUTO: 29.3 PG (ref 27–34)
MCHC RBC AUTO-ENTMCNC: 33.1 G/DL (ref 32–36)
MCV RBC AUTO: 89 FL (ref 80–100)
PLATELET # BLD AUTO: 343 THOU/UL (ref 140–440)
PMV BLD AUTO: 6.9 FL (ref 7–10)
POTASSIUM BLD-SCNC: 4.2 MMOL/L (ref 3.5–5)
PROT SERPL-MCNC: 6.5 G/DL (ref 6–8)
RBC # BLD AUTO: 4.41 MILL/UL (ref 3.8–5.4)
SODIUM SERPL-SCNC: 138 MMOL/L (ref 136–145)
TRIGL SERPL-MCNC: 64 MG/DL
WBC: 8.8 THOU/UL (ref 4–11)

## 2020-09-22 RX ORDER — ATORVASTATIN CALCIUM 20 MG/1
20 TABLET, FILM COATED ORAL DAILY
Qty: 90 TABLET | Refills: 3 | Status: SHIPPED | OUTPATIENT
Start: 2020-09-22 | End: 2021-11-18

## 2020-09-22 RX ORDER — LOSARTAN POTASSIUM AND HYDROCHLOROTHIAZIDE 12.5; 5 MG/1; MG/1
1 TABLET ORAL DAILY
Qty: 90 TABLET | Refills: 3 | Status: SHIPPED | OUTPATIENT
Start: 2020-09-22 | End: 2021-09-10

## 2020-09-22 ASSESSMENT — MIFFLIN-ST. JEOR: SCORE: 1062.05

## 2020-09-22 ASSESSMENT — PATIENT HEALTH QUESTIONNAIRE - PHQ9
SUM OF ALL RESPONSES TO PHQ QUESTIONS 1-9: 8
SUM OF ALL RESPONSES TO PHQ QUESTIONS 1-9: 8

## 2020-10-09 ENCOUNTER — COMMUNICATION - HEALTHEAST (OUTPATIENT)
Dept: INTERNAL MEDICINE | Facility: CLINIC | Age: 65
End: 2020-10-09

## 2020-10-12 ENCOUNTER — COMMUNICATION - HEALTHEAST (OUTPATIENT)
Dept: SCHEDULING | Facility: CLINIC | Age: 65
End: 2020-10-12

## 2020-10-13 ENCOUNTER — COMMUNICATION - HEALTHEAST (OUTPATIENT)
Dept: INTERNAL MEDICINE | Facility: CLINIC | Age: 65
End: 2020-10-13

## 2020-10-14 ENCOUNTER — COMMUNICATION - HEALTHEAST (OUTPATIENT)
Dept: INTERNAL MEDICINE | Facility: CLINIC | Age: 65
End: 2020-10-14

## 2020-11-12 ENCOUNTER — COMMUNICATION - HEALTHEAST (OUTPATIENT)
Dept: INTERNAL MEDICINE | Facility: CLINIC | Age: 65
End: 2020-11-12

## 2020-11-25 ENCOUNTER — OFFICE VISIT - HEALTHEAST (OUTPATIENT)
Dept: INTERNAL MEDICINE | Facility: CLINIC | Age: 65
End: 2020-11-25

## 2020-11-25 DIAGNOSIS — R42 VERTIGO: ICD-10-CM

## 2020-11-25 DIAGNOSIS — I10 ESSENTIAL HYPERTENSION: ICD-10-CM

## 2020-11-25 DIAGNOSIS — Z12.11 SCREEN FOR COLON CANCER: ICD-10-CM

## 2020-11-25 DIAGNOSIS — Z72.0 TOBACCO ABUSE: ICD-10-CM

## 2020-11-25 ASSESSMENT — MIFFLIN-ST. JEOR: SCORE: 1080.2

## 2020-12-04 ENCOUNTER — COMMUNICATION - HEALTHEAST (OUTPATIENT)
Dept: INTERNAL MEDICINE | Facility: CLINIC | Age: 65
End: 2020-12-04

## 2020-12-14 ENCOUNTER — COMMUNICATION - HEALTHEAST (OUTPATIENT)
Dept: ADMINISTRATIVE | Facility: REHABILITATION | Age: 65
End: 2020-12-14

## 2020-12-17 ENCOUNTER — COMMUNICATION - HEALTHEAST (OUTPATIENT)
Dept: ADMINISTRATIVE | Facility: CLINIC | Age: 65
End: 2020-12-17

## 2020-12-29 ENCOUNTER — COMMUNICATION - HEALTHEAST (OUTPATIENT)
Dept: ADMINISTRATIVE | Facility: CLINIC | Age: 65
End: 2020-12-29

## 2021-01-04 ENCOUNTER — OFFICE VISIT - HEALTHEAST (OUTPATIENT)
Dept: OCCUPATIONAL THERAPY | Facility: REHABILITATION | Age: 66
End: 2021-01-04

## 2021-01-04 ENCOUNTER — COMMUNICATION - HEALTHEAST (OUTPATIENT)
Dept: OCCUPATIONAL THERAPY | Facility: REHABILITATION | Age: 66
End: 2021-01-04

## 2021-01-04 DIAGNOSIS — Z78.9 DECREASED ACTIVITIES OF DAILY LIVING (ADL): ICD-10-CM

## 2021-01-04 DIAGNOSIS — R42 DIZZINESS: ICD-10-CM

## 2021-01-04 DIAGNOSIS — R26.81 UNSTEADINESS ON FEET: ICD-10-CM

## 2021-01-14 ENCOUNTER — COMMUNICATION - HEALTHEAST (OUTPATIENT)
Dept: FAMILY MEDICINE | Facility: CLINIC | Age: 66
End: 2021-01-14

## 2021-01-15 ENCOUNTER — COMMUNICATION - HEALTHEAST (OUTPATIENT)
Dept: INTERNAL MEDICINE | Facility: CLINIC | Age: 66
End: 2021-01-15

## 2021-01-20 ENCOUNTER — OFFICE VISIT - HEALTHEAST (OUTPATIENT)
Dept: OCCUPATIONAL THERAPY | Facility: REHABILITATION | Age: 66
End: 2021-01-20

## 2021-01-20 DIAGNOSIS — R42 DIZZINESS: ICD-10-CM

## 2021-01-20 DIAGNOSIS — R26.81 UNSTEADINESS ON FEET: ICD-10-CM

## 2021-01-20 DIAGNOSIS — Z78.9 DECREASED ACTIVITIES OF DAILY LIVING (ADL): ICD-10-CM

## 2021-02-03 ENCOUNTER — COMMUNICATION - HEALTHEAST (OUTPATIENT)
Dept: OCCUPATIONAL THERAPY | Facility: REHABILITATION | Age: 66
End: 2021-02-03

## 2021-02-05 ENCOUNTER — OFFICE VISIT - HEALTHEAST (OUTPATIENT)
Dept: INTERNAL MEDICINE | Facility: CLINIC | Age: 66
End: 2021-02-05

## 2021-02-05 DIAGNOSIS — J41.0 SIMPLE CHRONIC BRONCHITIS (H): ICD-10-CM

## 2021-02-05 DIAGNOSIS — R42 VERTIGO: ICD-10-CM

## 2021-02-05 DIAGNOSIS — F17.219 CIGARETTE NICOTINE DEPENDENCE WITH NICOTINE-INDUCED DISORDER: ICD-10-CM

## 2021-02-05 DIAGNOSIS — H91.90 DEAFNESS, UNSPECIFIED LATERALITY: ICD-10-CM

## 2021-02-05 ASSESSMENT — MIFFLIN-ST. JEOR: SCORE: 1107.41

## 2021-02-17 ENCOUNTER — OFFICE VISIT - HEALTHEAST (OUTPATIENT)
Dept: OCCUPATIONAL THERAPY | Facility: REHABILITATION | Age: 66
End: 2021-02-17

## 2021-02-17 DIAGNOSIS — R26.81 UNSTEADINESS ON FEET: ICD-10-CM

## 2021-02-17 DIAGNOSIS — Z78.9 DECREASED ACTIVITIES OF DAILY LIVING (ADL): ICD-10-CM

## 2021-02-17 DIAGNOSIS — R42 DIZZINESS: ICD-10-CM

## 2021-03-22 ENCOUNTER — OFFICE VISIT - HEALTHEAST (OUTPATIENT)
Dept: INTERNAL MEDICINE | Facility: CLINIC | Age: 66
End: 2021-03-22

## 2021-03-22 DIAGNOSIS — Z12.11 SCREEN FOR COLON CANCER: ICD-10-CM

## 2021-03-22 DIAGNOSIS — I10 ESSENTIAL HYPERTENSION: ICD-10-CM

## 2021-03-22 DIAGNOSIS — J41.0 SIMPLE CHRONIC BRONCHITIS (H): ICD-10-CM

## 2021-03-22 DIAGNOSIS — F17.219 CIGARETTE NICOTINE DEPENDENCE WITH NICOTINE-INDUCED DISORDER: ICD-10-CM

## 2021-03-22 DIAGNOSIS — R42 VERTIGO: ICD-10-CM

## 2021-03-22 DIAGNOSIS — R91.8 LUNG NODULES: ICD-10-CM

## 2021-03-22 ASSESSMENT — MIFFLIN-ST. JEOR: SCORE: 1109.96

## 2021-03-24 ENCOUNTER — AMBULATORY - HEALTHEAST (OUTPATIENT)
Dept: NURSING | Facility: CLINIC | Age: 66
End: 2021-03-24

## 2021-04-14 ENCOUNTER — AMBULATORY - HEALTHEAST (OUTPATIENT)
Dept: NURSING | Facility: CLINIC | Age: 66
End: 2021-04-14

## 2021-05-27 ASSESSMENT — PATIENT HEALTH QUESTIONNAIRE - PHQ9: SUM OF ALL RESPONSES TO PHQ QUESTIONS 1-9: 8

## 2021-05-27 NOTE — PROGRESS NOTES
Office Visit - Follow Up   Verena Lagunas   64 y.o. female    Date of Visit: 3/26/2019    Chief Complaint   Patient presents with     Dry Eye     Dizziness     Sinus Problem        Assessment and Plan   1. History of Meniere's disease  Unclear if this is Ménière's disease or other cause of vertigo.  Likely peripheral.  Start hydrochlorothiazide, patient requesting reasonable especially with her hypertension.  - Ambulatory referral to ENT  - hydroCHLOROthiazide (HYDRODIURIL) 12.5 MG tablet; Take 1 tablet (12.5 mg total) by mouth daily.  Dispense: 90 tablet; Refill: 3  - HM2(CBC w/o Differential)    2. Vertigo  - Ambulatory referral to ENT    3. Lung nodules - annual screen due 6/2019    4. Deafness, unspecified laterality  Use of  today    5. Tobacco abuse  Cessation advised    6. Hypertension  - hydroCHLOROthiazide (HYDRODIURIL) 12.5 MG tablet; Take 1 tablet (12.5 mg total) by mouth daily.  Dispense: 90 tablet; Refill: 3  - Comprehensive Metabolic Panel  - Thyroid Cascade  - HM2(CBC w/o Differential)    7. Allergic rhinitis  - fluticasone (FLONASE) 50 mcg/actuation nasal spray; 1 spray into each nostril daily.  Dispense: 16 g; Refill: 11    8. Mixed hyperlipidemia  - atorvastatin (LIPITOR) 20 MG tablet; Take 1 tablet (20 mg total) by mouth daily.  Dispense: 90 tablet; Refill: 3    9. Age-related osteoporosis without current pathological fracture  Consider treatment after dental work done      Return in about 3 months (around 6/26/2019) for recheck.     History of Present Illness   This 64 y.o. old woman comes in for follow-up.  She is been having some vertigo when she turns her head.  She was diagnosed with Ménière's disease many years ago.  She has had years where she is been asymptomatic.  She now wears this is coming back.  Previously she reduce her sodium intake which was helpful.  This time it has not been as helpful.  She has not seen ENT for many decades.  She has been having some  "dry eyes and she does plan to see her ophthalmologist.  She does have some lubricant drops which she is using.  She has been having a stuffy nose and sneezing.  She has not been using Flonase.  She has been taking aspirin and I had previous ended recommended a statin which she is now interested in taking.  She has friends who are on hydrochlorothiazide for Ménière's disease and she wonders if this may be helpful.  She has had borderline hypertension.  She does have osteoporosis and wants to defer any treatment until after some dental implants.    Review of Systems: A comprehensive review of systems was negative except as noted.     Medications, Allergies and Problem List   Reviewed, reconciled and updated     Physical Exam   General Appearance:   No acute distress    /64 (Patient Site: Right Arm, Patient Position: Sitting, Cuff Size: Adult Regular)   Pulse 82   Ht 5' 6\" (1.676 m)   Wt 124 lb (56.2 kg)   SpO2 98%   BMI 20.01 kg/m      HEENT exam is unremarkable  Neck supple no thyromegaly or nodule palpable  Lymphatic no cervical lymphadenopathy  Cardiovascular regular rate and rhythm no murmur gallop or rub  Pulmonary lungs are clear to auscultation bilaterally  Gastrointestinall abdomen soft nontender nondistended no organomegaly  Neurologic exam is non focal  Psychiatric pleasant, no confusion or agitation        Additional Information   Current Outpatient Medications   Medication Sig Dispense Refill     aspirin 81 MG EC tablet Take 81 mg by mouth daily.       cholecalciferol, vitamin D3, (VITAMIN D3) 1,000 unit capsule Take 1 capsule (1,000 Units total) by mouth daily.  0     fluticasone (FLONASE) 50 mcg/actuation nasal spray 1 spray into each nostril daily. 16 g 11     atorvastatin (LIPITOR) 20 MG tablet Take 1 tablet (20 mg total) by mouth daily. 90 tablet 3     hydroCHLOROthiazide (HYDRODIURIL) 12.5 MG tablet Take 1 tablet (12.5 mg total) by mouth daily. 90 tablet 3     No current " facility-administered medications for this visit.      Allergies   Allergen Reactions     Cat Hair Std Allergenic Ext      Penicillins Shortness Of Breath     Social History     Tobacco Use     Smoking status: Current Every Day Smoker     Packs/day: 0.50     Types: Cigarettes     Smokeless tobacco: Never Used   Substance Use Topics     Alcohol use: No     Comment: rare     Drug use: No       Review and/or order of clinical lab tests:  Review and/or order of radiology tests:  Review and/or order of medicine tests:  Discussion of test results with performing physician:  Decision to obtain old records and/or obtain history from someone other than the patient:  Review and summarization of old records and/or obtaining history from someone other than the patient and.or discussion of case with another health care provider:  Independent visualization of image, tracing or specimen itself:    Time:      Tomasz Beltran MD

## 2021-05-27 NOTE — TELEPHONE ENCOUNTER
Attempted to contact the patient again, they state that phone number has been disconnected.  If the patient happens to call back, please relay message below from Dr. Beltran and help the patient to schedule an appointment to be seen in clinic.  Chloe LINTON CMA/LEILA....................11:00 AM

## 2021-05-31 VITALS — WEIGHT: 128.1 LBS | HEIGHT: 66 IN | BODY MASS INDEX: 20.59 KG/M2

## 2021-05-31 VITALS — HEIGHT: 66 IN | WEIGHT: 125 LBS | BODY MASS INDEX: 20.09 KG/M2

## 2021-05-31 NOTE — TELEPHONE ENCOUNTER
Medication Question or Clarification  Who is calling: Patient with    What medication are you calling about? (include dose and sig) Flonase, 1 spray into each nostril daily  Who prescribed the medication?: Dr. Beltran  What is your question/concern?: She has been using Flonase without any complications. She used it two days ago and developed swelling under left eye.  It also feels like the spray is not going into nostril.  She is asking if Dr. Beltran could recommend a different nasal spray to use for allergic rhinitis?  Pharmacy:[id number]  Okay to leave a detailed message?: Yes but please reply using [employer].  Pateint is hearing impaired.  Site CMT - Please call the pharmacy to obtain any additional needed information.    [employer].  Sign Language Interpretor ID # [id number]

## 2021-05-31 NOTE — PROGRESS NOTES
Assessment and Plan:   1. Routine general medical examination at a health care facility  This is a 64-year-old woman with issues as discussed below    2. Tobacco abuse  Lung Cancer Screening pre-scan counseling Visit    The patient fits the risk profile of patients who benefit from this screening:  -The patient is >55 years old and <80 years old  -The patient has 30 pack year history (over 30)  -The patient has smoked within the past 15 years  -The patient has no medical comorbidity severe enough that it would cause mortality prior to mortality due to the lung cancer attempting to be detected.    Discussion with patient regarding the harms associated with LDCT screening include false-negative and false-positive results, incidental findings, overdiagnosis, and radiation exposure were reviewed at length.   The patient understands that pursuing this screening test may result in a biopsy that was not necessary. It may also produced added stress over a nodule that is likely not cancer.    Of 100 patients who get screening, 25 will have a positive scan. Of those 25, only 1 will have cancer.  Overdiagnosis is estimated at 10% of patients-- they would not have been detected in the patient's lifetime without screening. Less than 1% of patients likely had death related to radiation exposure increase.   Average low-dose CT associated with 0.61 to 1.5 mSv. Annual background radiation exposure in the [place] averages 2.4 mS; mammogram is 0.7mSv.    The benefits are reduction in risk of death from lung cancer. The number needed to treat is 320 (for every 320 patients who undergo screening, 1 patient will have a benefit in mortality from early detection from the screening).    Undergoing this screening implies willingness to pursue further potentially invasive testing to discover potential cancer.    All questions were answered.    The patient was counseled regarding smoking cessation and its risk for lung cancer.    - CT  Low Dose Lung Screening Chest; Future    3. Lung nodules - annual screen due 6/2019  As above    4. Age-related osteoporosis without current pathological fracture  Recommend Fosamax, she declines  - calcium-vitamin D (CALCIUM-VITAMIN D) 500 mg(1,250mg) -200 unit per tablet; Take 1 tablet by mouth daily.  Dispense: 100 tablet; Refill: 2    5. Hypertension  Add lisinopril to hydrochlorothiazide, follow-up 2 weeks  - lisinopril-hydrochlorothiazide (PRINZIDE,ZESTORETIC) 10-12.5 mg per tablet; Take 1 tablet by mouth daily.  Dispense: 90 tablet; Refill: 3  - Potassium  - Creatinine    6. Mixed hyperlipidemia  Continue statin  - Lipid Cascade    7. COPD  Looking cessation advised    8. Deafness, unspecified laterality    9. Allergic rhinitis, unspecified seasonality, unspecified trigger  With some vertigo, continue nasal spray, testing per Dr. Lira Service    10. Advanced care planning/counseling discussion  We did have a 16-minute discussion today.  He remains full code.  Her  would be her medical decision-maker in the event that she could not make decisions for herself.  She was given honoring choices paperwork    The patient's current medical problems were reviewed.    I have had an Advance Directives discussion with the patient.  The following health maintenance schedule was reviewed with the patient and provided in printed form in the after visit summary:   Health Maintenance   Topic Date Due     HEPATITIS C SCREENING  1955     HIV SCREENING  01/30/1969     COLONOSCOPY  01/30/2004     ZOSTER VACCINES (1 of 2) 01/30/2004     PNEUMOCOCCAL POLYSACCHARIDE VACCINE AGE 65 AND OVER  01/30/2019     PNEUMOCOCCAL CONJUGATE VACCINE FOR ADULTS (PCV13 OR PREVNAR)  01/30/2019     MEDICARE ANNUAL WELLNESS VISIT  05/29/2019     INFLUENZA VACCINE RULE BASED (1) 08/01/2019     FALL RISK ASSESSMENT  03/26/2020     DXA SCAN  06/18/2020     MAMMOGRAM  08/26/2020     ADVANCE CARE PLANNING  05/18/2021     TD 18+ HE   2021     TDAP ADULT ONE TIME DOSE  Completed        Subjective:   Chief Complaint: Verena Lagunas is an 64 y.o. female here for an Annual Wellness visit.   HPI: This 64-year-old woman comes in for physical.  She is not on Medicare I believe.  Overall feeling about the same.  Still with intermittent vertigo may be a little bit improved on hydrochlorothiazide.  Saw Dr. Pankaj Santiago who did not feel she has Ménière's disease.  I do not have his documentation and this is per patient report.  She has upcoming vestibular testing.  She continues to smoke.  She does not want treatment for osteoporosis.  She will take calcium and vitamin D.  She does not want any immunizations.    Review of Systems:  Please see above.  The rest of the review of systems are negative for all systems.    Patient Care Team:  Tomasz Beltran MD as PCP - General (Internal Medicine)     Patient Active Problem List   Diagnosis     Allergic Rhinitis     Mixed hyperlipidemia     Hypertension     Mammogram Right Breast Diffuse Calcification     Age-related osteoporosis without current pathological fracture     Vitamin D deficiency     Tobacco abuse     Deaf     COPD     Lung nodules - annual screen due 2019     Past Medical History:   Diagnosis Date     Allergic rhinitis      Hypertension      Tobacco abuse       Past Surgical History:   Procedure Laterality Date     OVARIAN CYST REMOVAL Right 1970      Family History   Problem Relation Age of Onset     Breast cancer Maternal Aunt 75     Colon cancer Mother 79             Lung cancer Father 50             Other Brother         6 brothers and 1 sister     No Medical Problems Sister       Social History     Socioeconomic History     Marital status:      Spouse name: Not on file     Number of children: Not on file     Years of education: Not on file     Highest education level: Not on file   Occupational History     Not on file   Social Needs     Financial  resource strain: Not on file     Food insecurity:     Worry: Not on file     Inability: Not on file     Transportation needs:     Medical: Not on file     Non-medical: Not on file   Tobacco Use     Smoking status: Current Every Day Smoker     Packs/day: 0.50     Types: Cigarettes     Smokeless tobacco: Never Used   Substance and Sexual Activity     Alcohol use: No     Comment: rare     Drug use: No     Sexual activity: Not on file   Lifestyle     Physical activity:     Days per week: Not on file     Minutes per session: Not on file     Stress: Not on file   Relationships     Social connections:     Talks on phone: Not on file     Gets together: Not on file     Attends Islam service: Not on file     Active member of club or organization: Not on file     Attends meetings of clubs or organizations: Not on file     Relationship status: Not on file     Intimate partner violence:     Fear of current or ex partner: Not on file     Emotionally abused: Not on file     Physically abused: Not on file     Forced sexual activity: Not on file   Other Topics Concern     Not on file   Social History Narrative    [place]  [place]yn[place].  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  No children.        Current Outpatient Medications   Medication Sig Dispense Refill     aspirin 81 MG EC tablet Take 81 mg by mouth daily.       atorvastatin (LIPITOR) 20 MG tablet Take 1 tablet (20 mg total) by mouth daily. 90 tablet 3     azelastine (ASTELIN) 137 mcg (0.1 %) nasal spray Use in each nostril as directed 30 mL 0     cholecalciferol, vitamin D3, (VITAMIN D3) 1,000 unit capsule Take 1 capsule (1,000 Units total) by mouth daily.  0     fluticasone (FLONASE) 50 mcg/actuation nasal spray 1 spray into each nostril daily. 16 g 11     calcium-vitamin D (CALCIUM-VITAMIN D) 500 mg(1,250mg) -200 unit per tablet Take 1  "tablet by mouth daily. 100 tablet 2     lisinopril-hydrochlorothiazide (PRINZIDE,ZESTORETIC) 10-12.5 mg per tablet Take 1 tablet by mouth daily. 90 tablet 3     No current facility-administered medications for this visit.       Objective:   Vital Signs:   Visit Vitals  /88 (Patient Site: Right Arm, Patient Position: Sitting, Cuff Size: Adult Large)   Pulse 76   Ht 5' 5\" (1.651 m)   Wt 123 lb (55.8 kg)   SpO2 99%   BMI 20.47 kg/m         VisionScreening:  No exam data present     PHYSICAL EXAM  EYES: Eyelids, conjunctiva, and sclera were normal. Pupils were normal. Cornea, iris, and lens were normal bilaterally.  HEAD, EARS, NOSE, MOUTH, AND THROAT: Head and face were normal. Hearing was normal to voice and the ears were normal to external exam. Nose appearance was normal and there was no discharge. Oropharynx was normal.  NECK: Neck appearance was normal. There were no neck masses and the thyroid was not enlarged.  RESPIRATORY: Breathing pattern was normal and the chest moved symmetrically.  Percussion/auscultatory percussion was normal.  Lung sounds were normal and there were no abnormal sounds.  CARDIOVASCULAR: Heart rate and rhythm were normal.  S1 and S2 were normal and there were no extra sounds or murmurs. Peripheral pulses in arms and legs were normal.  Jugular venous pressure was normal.  There was no peripheral edema.  GASTROINTESTINAL: The abdomen was normal in contour.  Bowel sounds were present.  Percussion detected no organ enlargement or tenderness.  Palpation detected no tenderness, mass, or enlarged organs.   MUSCULOSKELETAL: Skeletal configuration was normal and muscle mass was normal for age. Joint appearance was overall normal.  LYMPHATIC: There were no enlarged nodes.  SKIN/HAIR/NAILS: Skin color was normal.  There were no skin lesions.  Hair and nails were normal.  NEUROLOGIC: The patient was alert and oriented to person, place, time, and circumstance. Speech was normal. Cranial nerves " were normal with the exception of deafness. Motor strength was normal for age. The patient was normally coordinated.  PSYCHIATRIC:  Mood and affect were normal and the patient had normal recent and remote memory. The patient's judgment and insight were normal.    Assessment Results 9/3/2019   Activities of Daily Living No help needed   Instrumental Activities of Daily Living No help needed   Get Up and Go Score Less than 12 seconds   Mini Cog Total Score 3   Some recent data might be hidden     A Mini-Cog score of 0-2 suggests the possibility of dementia, score of 3-5 suggests no dementia    Identified Health Risks:     The patient was provided with suggestions to help her develop a healthy physical lifestyle.   The patient was provided with written information regarding signs of hearing loss.  She is at risk for falling and has been provided with information to reduce the risk of falling at home.  Information regarding advance directives (living schafer), including where she can download the appropriate form, was provided to the patient via the AVS.

## 2021-05-31 NOTE — TELEPHONE ENCOUNTER
I called and left Meek a detailed message to leave me a detailed message as to why Verena needs her FMLA paperwork filled out.

## 2021-05-31 NOTE — TELEPHONE ENCOUNTER
FYI - Status Update  Who is Calling: Meek Salamanca  Update: Returning Sushma's phone call. Please reach back out to Meek at:   [phone].  Okay to leave a detailed message?:  Yes

## 2021-05-31 NOTE — TELEPHONE ENCOUNTER
Left Meek Salamanca a detailed message to call me back regarding Verena's [facility] paperwork.  Meek's ph. # [phone]

## 2021-05-31 NOTE — PROGRESS NOTES
"  Office Visit - Follow Up   Verena Lagunas   64 y.o. female    Date of Visit: 7/31/2019    Chief Complaint   Patient presents with     Sinus Problem     Gait Problem     Dizziness        Assessment and Plan   1. History of Meniere's disease  She has persistent vertigo, somewhat positional.  I recommended an MRI of the brain which she declined.  She will start Flonase for nasal congestion and start hydrochlorothiazide.  Have asked her to see ENT.  - Ambulatory referral to ENT    2. Vertigo  - Ambulatory referral to ENT    3. Deafness, unspecified laterality  - Ambulatory referral to ENT    4. Visit for screening mammogram  - Ambulatory referral to ENT    Return in about 4 weeks (around 8/28/2019) for recheck.     History of Present Illness   This 64 y.o. old woman comes in for follow-up of vertigo.  This is ongoing.  She is a history of Ménière's disease.  I previously recommended some Flonase for some allergic rhinitis as well as hydrochlorothiazide.  Her blood pressure has been slightly elevated.  I recommended ENT evaluation.  She has not yet kept this appointment.  She notices vertigo when she turns her head.  She has imbalance and has not been at work for the past week or so.  No significant headache.  Some nasal congestion.  Some sinus pressure.  No fever chills.  No tooth pain.  She continues to smoke.    Review of Systems: A comprehensive review of systems was negative except as noted.     Medications, Allergies and Problem List   Reviewed, reconciled and updated  Post Discharge Medication Reconciliation Status:      Physical Exam   General Appearance:   No acute distress    /76 (Patient Site: Right Arm, Patient Position: Sitting, Cuff Size: Adult Regular)   Pulse 81   Ht 5' 6\" (1.676 m)   Wt 122 lb (55.3 kg)   SpO2 99%   BMI 19.69 kg/m      No nystagmus noted on exam.  Tympanic membranes unremarkable.  I am unable to provoke any nystagmus with provocative maneuvers.  She has normal coordination " in her upper extremities.  She cannot do tandem gait.  Cardiovascular regular and rhythm no murmur gallop rub lungs clear to auscultation bilaterally.  Remainder of neuro exam unremarkable with exception of congenital deafness     Additional Information   Current Outpatient Medications   Medication Sig Dispense Refill     aspirin 81 MG EC tablet Take 81 mg by mouth daily.       atorvastatin (LIPITOR) 20 MG tablet Take 1 tablet (20 mg total) by mouth daily. 90 tablet 3     cholecalciferol, vitamin D3, (VITAMIN D3) 1,000 unit capsule Take 1 capsule (1,000 Units total) by mouth daily.  0     fluticasone (FLONASE) 50 mcg/actuation nasal spray 1 spray into each nostril daily. 16 g 11     hydroCHLOROthiazide (HYDRODIURIL) 12.5 MG tablet Take 1 tablet (12.5 mg total) by mouth daily. 90 tablet 3     No current facility-administered medications for this visit.      Allergies   Allergen Reactions     Cat Hair Std Allergenic Ext      Penicillins Shortness Of Breath     Social History     Tobacco Use     Smoking status: Current Every Day Smoker     Packs/day: 0.50     Types: Cigarettes     Smokeless tobacco: Never Used   Substance Use Topics     Alcohol use: No     Comment: rare     Drug use: No       Review and/or order of clinical lab tests:  Review and/or order of radiology tests:  Review and/or order of medicine tests:  Discussion of test results with performing physician:  Decision to obtain old records and/or obtain history from someone other than the patient:  Review and summarization of old records and/or obtaining history from someone other than the patient and.or discussion of case with another health care provider:  Independent visualization of image, tracing or specimen itself:    Time:      Tomasz Beltran MD

## 2021-05-31 NOTE — TELEPHONE ENCOUNTER
I tried calling Devyn but she didn't answer.  When she calls back please try to find out why Verena needs FMLA.

## 2021-05-31 NOTE — TELEPHONE ENCOUNTER
Who is calling:  Meek  Reason for Call:  Attempting to connect with Sushma. Please call her at [phone]  Date of last appointment with primary care: N/A  Okay to leave a detailed message: Yes

## 2021-05-31 NOTE — TELEPHONE ENCOUNTER
I spoke with Devyn and she said that Verena needs this paperwork filled out due to her Meniere's disease.  Verena has an appt with Dr. Beltran next Tuesday September 3rd and she will get the paperwork filled out than.

## 2021-05-31 NOTE — TELEPHONE ENCOUNTER
Sent my chart note and script set up.    Gabrielle Tellez CMA ([facility])

## 2021-05-31 NOTE — TELEPHONE ENCOUNTER
Devyn, from the [employer], is calling back regarding this patient and the patient's FMLA paperwork.  Devyn states that they are questioning which qualifying medical condition under FMLA does the patient have.  Devyn states that the patient has been treated in our office by Dr. Beltran and the patient may have provided the paperwork for the FMLA certification.  Devyn needs a copy of the certification and it can be faxed to her secure fax line at [phone].  Devyn can be reached until 4 pm today 8/28/19 until 4 pm otherwise she will be back in her office on 9/3/19.  This was discussed on 8/15/19 as well.  Please advise.

## 2021-05-31 NOTE — TELEPHONE ENCOUNTER
I spoke with Devyn and she said that Verena needs this paperwork fill out due to meniere's disease.  Patient has an appt with Dr. Beltran next Sep 3rd and than she will get this filled out.  Than we will fax it over.

## 2021-05-31 NOTE — TELEPHONE ENCOUNTER
I called and left another message for Meek to call me back.  I also tried calling Verena to let her know what is happening and she was unavailable to answer the phone.

## 2021-05-31 NOTE — TELEPHONE ENCOUNTER
Patient Returning Call  Reason for call:  Meek Salamanca / [employer] [facility].   Information relayed to patient:  NA. Meek Salamanca / [employer] [facility] would like a return call from Dr. Beltran's assistant. She can be reached at [phone] anytime today before 4:00 pm.   Patient has additional questions:  No  If YES, what are your questions/concerns:  NA  Okay to leave a detailed message?: No

## 2021-05-31 NOTE — TELEPHONE ENCOUNTER
Who is calling:  Patient with  ID # [id number] for sign language   Reason for Call:  Patient states that she is going to fax FMLA form today to clinic. After completion there are details on the cover sheet where to fax back. Patient requested to send my chart message after receiving the FMLA form  To clinic.  Date of last appointment with primary care: 3/26/19  Okay to leave a detailed message: No

## 2021-06-01 VITALS — BODY MASS INDEX: 19.29 KG/M2 | HEIGHT: 66 IN | WEIGHT: 120 LBS

## 2021-06-01 VITALS — WEIGHT: 122 LBS | BODY MASS INDEX: 19.61 KG/M2 | HEIGHT: 66 IN

## 2021-06-01 VITALS — HEIGHT: 66 IN | BODY MASS INDEX: 19.13 KG/M2 | WEIGHT: 119 LBS

## 2021-06-01 NOTE — PROGRESS NOTES
"  Office Visit - Follow Up   Verena Lagunas   64 y.o. female    Date of Visit: 9/18/2019    Chief Complaint   Patient presents with     Hypertension        Assessment and Plan   1. Hypertension  Blood pressure controlled now, continue current medications check labs  - Potassium  - Creatinine    2. Mixed hyperlipidemia  Continue atorvastatin    3. Allergic rhinitis, unspecified seasonality, unspecified trigger  Discontinue sprays  - loratadine (CLARITIN) 10 mg tablet; Take 1 tablet (10 mg total) by mouth daily.  Dispense: 90 tablet; Refill: 3    4. Tobacco abuse  Cessation advised, discussed FDA approved smoking cessation modalities again.  She is going to get her CT lung cancer screening test in October    5. Age-related osteoporosis without current pathological fracture  Does not want any pharmacologic therapy, continue adequate calcium vitamin D, discussed and outlined.  Importance of weightbearing exercise discussed    6. Vitamin D deficiency  Continue vitamin D    Return in about 6 months (around 3/18/2020) for recheck.     History of Present Illness   This 64 y.o. old comes in for follow-up.  Overall doing well.  Tolerating blood pressure medication.  No lightheadedness or worsening of chronic dizziness.  Not tolerating nasal sprays, still with some nasal drainage and allergy symptoms.  Continues to smoke.  Is not sure how much calcium vitamin D she should be taking.  She is back on atorvastatin and tolerating    Review of Systems: A comprehensive review of systems was negative except as noted.     Medications, Allergies and Problem List   Reviewed, reconciled and updated  Post Discharge Medication Reconciliation Status:      Physical Exam   General Appearance:   No acute distress    /72 (Patient Site: Left Arm, Patient Position: Sitting, Cuff Size: Adult Regular)   Pulse 76   Ht 5' 5\" (1.651 m)   Wt 125 lb (56.7 kg)   SpO2 95%   BMI 20.80 kg/m      HEENT exam is unremarkable  Neck supple no " thyromegaly or nodule palpable  Lymphatic no cervical lymphadenopathy  Cardiovascular regular rate and rhythm no murmur gallop or rub  Pulmonary lungs are clear to auscultation bilaterally  Gastrointestinal abdomen soft nontender nondistended no organomegaly  Neurologic exam is non focal  Psychiatric pleasant, no confusion or agitation        Additional Information   Current Outpatient Medications   Medication Sig Dispense Refill     aspirin 81 MG EC tablet Take 81 mg by mouth daily.       atorvastatin (LIPITOR) 20 MG tablet Take 1 tablet (20 mg total) by mouth daily. 90 tablet 3     calcium-vitamin D (CALCIUM-VITAMIN D) 500 mg(1,250mg) -200 unit per tablet Take 1 tablet by mouth daily. 100 tablet 2     cholecalciferol, vitamin D3, (VITAMIN D3) 1,000 unit capsule Take 1 capsule (1,000 Units total) by mouth daily.  0     lisinopril-hydrochlorothiazide (PRINZIDE,ZESTORETIC) 10-12.5 mg per tablet Take 1 tablet by mouth daily. 90 tablet 3     loratadine (CLARITIN) 10 mg tablet Take 1 tablet (10 mg total) by mouth daily. 90 tablet 3     No current facility-administered medications for this visit.      Allergies   Allergen Reactions     Cat Hair Std Allergenic Ext      Penicillins Shortness Of Breath     Social History     Tobacco Use     Smoking status: Current Every Day Smoker     Packs/day: 0.50     Types: Cigarettes     Smokeless tobacco: Never Used   Substance Use Topics     Alcohol use: No     Comment: rare     Drug use: No       Review and/or order of clinical lab tests:  Review and/or order of radiology tests:  Review and/or order of medicine tests:  Discussion of test results with performing physician:  Decision to obtain old records and/or obtain history from someone other than the patient:  Review and summarization of old records and/or obtaining history from someone other than the patient and.or discussion of case with another health care provider:  Independent visualization of image, tracing or specimen  itself:    Time:      Tomasz Beltran MD

## 2021-06-01 NOTE — TELEPHONE ENCOUNTER
She should stick with Flonase nasal spray.  If Claritin is making her symptoms where she should not take this.

## 2021-06-02 VITALS — BODY MASS INDEX: 19.93 KG/M2 | HEIGHT: 66 IN | WEIGHT: 124 LBS

## 2021-06-02 VITALS — WEIGHT: 124 LBS | HEIGHT: 66 IN | BODY MASS INDEX: 19.93 KG/M2

## 2021-06-02 VITALS — HEIGHT: 66 IN | BODY MASS INDEX: 19.61 KG/M2 | WEIGHT: 122 LBS

## 2021-06-02 NOTE — TELEPHONE ENCOUNTER
"RN Triage:    Spoke with 64 yr old Verena who c/o continued cough despite taking 2 rounds of Z-Jerod, 10/4/19 and 10/22/19.      Pt reports she just finished the second Z-Jerod last night.    Pt reports her phlegm has changed from green to yellow.    Reports occasional coughing jags.    Reports still having a tickle in her throat and feeling \"dry.\"    Effects sleep at night.      Using Flonase every morning for her sinus allergies.    Sometimes feels cold and hot at times.    Denies chest pain or difficulty breathing.    PLAN:  Advised OV within the next 3 days if not better.  Pt already has OV scheduled for 11/7/19.  Will consult with PCP regarding level of care and ability to work into office schedule this week.   Pt aware PCP is not in the office today.  Advised that the Z-Jerod continues to stay in system beyond the initial 5 days and symptoms may improve.  Advised to increase fluids and drink warm fluids.  Advised use of humidifier at night and breathing warm steam from a steamy bathroom for coughing spells.  Advised to call back if symptoms are worsening.  Pt voiced understanding.    Debo Arvizu RN   [facility] RN Triage    Reason for Disposition    Cough has been present for > 3 weeks    Additional Information    Negative: Known COPD or other severe lung disease (i.e., bronchiectasis, cystic fibrosis, lung surgery) and worsening symptoms (i.e., increased sputum purulence or amount, increased breathing difficulty)     Denies history of COPD    Protocols used: COUGH-A-OH      "

## 2021-06-02 NOTE — TELEPHONE ENCOUNTER
Oklucrecia for azithromycin, Raysa.  Additionally please have her use eyedrops such as Systane as needed for dry eyes.  Systane is available over-the-counter

## 2021-06-03 VITALS
SYSTOLIC BLOOD PRESSURE: 136 MMHG | HEIGHT: 65 IN | HEART RATE: 76 BPM | BODY MASS INDEX: 20.83 KG/M2 | WEIGHT: 125 LBS | DIASTOLIC BLOOD PRESSURE: 72 MMHG | OXYGEN SATURATION: 95 %

## 2021-06-03 VITALS
SYSTOLIC BLOOD PRESSURE: 124 MMHG | WEIGHT: 123 LBS | BODY MASS INDEX: 20.49 KG/M2 | HEART RATE: 92 BPM | HEIGHT: 65 IN | DIASTOLIC BLOOD PRESSURE: 72 MMHG | OXYGEN SATURATION: 96 %

## 2021-06-03 VITALS
SYSTOLIC BLOOD PRESSURE: 158 MMHG | DIASTOLIC BLOOD PRESSURE: 88 MMHG | HEART RATE: 76 BPM | WEIGHT: 123 LBS | BODY MASS INDEX: 20.49 KG/M2 | HEIGHT: 65 IN | OXYGEN SATURATION: 99 %

## 2021-06-03 VITALS — BODY MASS INDEX: 19.61 KG/M2 | HEIGHT: 66 IN | WEIGHT: 122 LBS

## 2021-06-03 NOTE — PROGRESS NOTES
[facility] Daily Progress     Patient Name: Verena Lagunas  Date: 12/3/2019  Visit #: 2  Referral Diagnosis: dizziness  Referring provider: Tomasz Beltran MD  Visit Diagnosis:     ICD-10-CM    1. Dizziness R42    2. Unsteadiness on feet R26.81    3. Decreased activities of daily living (ADL) Z78.9          Assessment:     Patient is appropriate to continue with skilled occupational therapy intervention, as indicated by initial plan of care.    Goal Status:  Patient will be independent with home exercise program in: 4 weeks  Patient will be able to walk: with head motion;on uneven/inclined surfaces;without loss of balance;in 12 weeks  Patient will bend: to dress;to clean;without dizziness;without loss of balance;in 12 weeks  Patient will ascend / descend: stairs/curbs;without loss of balance;in 12 weeks  Patient will turn head: without dizziness;for conversation;in 12 weeks  Patient will look up / down: without dizziness;for drinking;in 12 weeks      Plan / Patient Education:     Continue with initial plan of care. Progress with home program as tolerated.    Subjective:     Pain Ratin    Subjective progress relative to last visit:  Intensity of dizziness is:  less  Frequency of dizziness is: less  Duration of dizziness is: less  Balance is:  better    Objective:     Positional Tests: NT    Plan for next visit: progress HEP    Treatment Today:   Patient is feeling better and reports improved balance. Reviewed HEP and progressed some exercises.  X1 viewing-5 seconds-Patient was performing this incorrectly and was re-instructed on this exercise.  Gait with head motion-continue  Normal surface eyes closed-continue but patient instructed to find a slightly perturbed surface to increase challenge  TREATMENT MINUTES COMMENTS   Evaluation     Self-care/ Home management     Neuromuscular Re-education 15    Canalith repositioning procedure           Total 15    Blank areas are intentional and mean the  treatment did not include these items.          Mavis Mixon  12/3/2019  10:15 AM

## 2021-06-03 NOTE — PROGRESS NOTES
[facility] Certification Request    November 22, 2019      Patient: Verena Lagunas  MR Number: [id number]  YOB: 1955  Date of Visit: 11/22/2019      Dear Dr. Tomasz Beltran:    Thank you for this referral.   We are seeing Verena Lagunas in Occupational Therapy for vertigo.    Medicare and/or Medicaid requires physician review and approval of the treatment plan. Please review the plan of care and verify that you agree with the therapy plan of care by co-signing this note.      Plan of Care  Authorization / Certification Start Date: 11/22/19  Authorization / Certification End Date: 02/20/20  Authorization / Certification Number of Visits: 12  Communication with: Referral Source  Patient Related Instruction: Nature of Condition;Treatment plan and rationale;Basis of treatment;Expected outcome  Times per Week: 1  Number of Weeks: 12  Number of Visits: 12  Neuromuscular Reeducation: vestibular  Canolith Repostioning:        Goals:  Patient will be independent with home exercise program in: 4 weeks  Patient will be able to walk: with head motion;on uneven/inclined surfaces;without loss of balance;in 12 weeks  Patient will bend: to dress;to clean;without dizziness;without loss of balance;in 12 weeks  Patient will ascend / descend: stairs/curbs;without loss of balance;in 12 weeks  Patient will turn head: without dizziness;for conversation;in 12 weeks  Patient will look up / down: without dizziness;for drinking;in 12 weeks        If you have any questions or concerns, please don't hesitate to call.    Sincerely,      Mavis Mixon, OT        Physician recommendation:                                ___ Follow therapist's recommendation                                                                                                    ___ Modify therapy                                                                                                      Physician Signature:_____________________                                                                                                                                         Date:___________________________    *Physician co-signature indicates they certify the need for these services furnished within this plan and while under their care.      [facility]   Vestibular Initial Evaluation    Patient Name: Verena Lagunas  Date of evaluation: 11/22/2019  Referral Diagnosis: dizziness  Referring provider: Tomasz Beltran MD  Visit Diagnosis:     ICD-10-CM    1. Dizziness R42    2. Unsteadiness on feet R26.81    3. Decreased activities of daily living (ADL) Z78.9        Assessment:      Symptoms are consistent with weakened vestibular function. She was instructed on adaptation and substitution exercises today.    Goals:  Patient will be independent with home exercise program in: 4 weeks  Patient will be able to walk: with head motion;on uneven/inclined surfaces;without loss of balance;in 12 weeks  Patient will bend: to dress;to clean;without dizziness;without loss of balance;in 12 weeks  Patient will ascend / descend: stairs/curbs;without loss of balance;in 12 weeks  Patient will turn head: without dizziness;for conversation;in 12 weeks  Patient will look up / down: without dizziness;for drinking;in 12 weeks    Patient's expectations/goals are realistic.    Barriers to Learning or Achieving Goals:  No Barriers.       Plan / Patient Instructions:        Plan of Care:   Authorization / Certification Start Date: 11/22/19  Authorization / Certification End Date: 02/20/20  Authorization / Certification Number of Visits: 12  Communication with: Referral Source  Patient Related Instruction: Nature of Condition;Treatment plan and rationale;Basis of treatment;Expected outcome  Times per Week: 1  Number of Weeks: 12  Number of Visits: 12  Neuromuscular Reeducation: vestibular  Canolith Repostioning:           Subjective:         History of Present Illness:     Verena is a 64 y.o. female who presents to therapy today with complaints of dizziness and unsteadiness. Patient had sudden onset of symptoms in the late 80's.2 Symptoms have been worse since 2019. Patient denies ear pain. Patient is deaf. Functional limitations are described as occurring with balance, bending, bed mobility, head turns, looking up or down, stepping over curbs.    Pain Ratin         Objective:      Note: Items left blank indicates the item was not performed or not indicated at the time of the evaluation.    Patient Outcome Measures:   Dizziness Handicap Inventory  Score in %: 48       Vestibular Disorder Examination  1. Dizziness     2. Unsteadiness on feet     3. Decreased activities of daily living (ADL)         Precautions/Restrictions: None    Posture Observation: General standing posture is fair.    ROM:  Not Tested    Strength: Not Tested    Sensation: NT    Functional Mobility: fair      Modified CTSIB:   Normal Surface Eyes Open- normal  Normal Surface Eyes Closed- severe sway  Perturbed Surface Eyes Open- NT  Perturbed Surface Eyes Closed- NT    The remainder of the tests are performed in room light.    Oculomotor Assessment: NT  Spontaneous Nystagmus with fixation:   Spontaneous Nystagmus without fixation:  Gaze-evoked nystagmus:  Smooth pursuit:  Saccades:  VOR to slow movement:   VOR Head Thrust:  VOR Cancellation:  OPK's: NT  Static Visual Acuity:  Dynamic Visual Acuity:    Positional Tests: NT    Plan for next visit: progress HEP    Treatment Today:  Patient instructed on adaptation and substitution exercises.  X1 viewing-5 seconds-instructed  Gait with head motion-instructed  Normal surface eyes closed-instructed  TREATMENT MINUTES COMMENTS   Evaluation 20    Self-care/ Home management     Neuromuscular Re-education 25    Canalith repositioning procedure           Total 45    Blank areas are intentional and mean the treatment did not include these items.     GOALS AND PLAN OF  CARE WERE ESTABLISHED IN COOPERATION WITH THE PATIENT    OT Evaluation Code: (Please list factors)   Comorbidities:   Patient Active Problem List   Diagnosis     Allergic Rhinitis     Mixed hyperlipidemia     Hypertension     Mammogram Right Breast Diffuse Calcification     Age-related osteoporosis without current pathological fracture     Vitamin D deficiency     Tobacco abuse     Deaf     COPD     Lung nodules - annual screen due 6/2019       Profile/History Review: Brief    Need for eval modification: No    # Treatment options: Limited    Clinical Decision Making:  Low      Occupational Profile/ Medical and Therapy History and Comorbidities Occupational Performance Clinical Decision Making   (Complexity)   brief history with review of medical/therapy records related to the presenting problem.  No comorbidities 1-3 Performance deficits that result in activity limitations and/or participation restrictions.    No Assessment Modification  Low complexity, which includes  problem-focused assessments, and consideration of a limited number of treatment options.      expanded review of medical/therapy records and additional review of physical, cognitive and psychosocial history.    May have comorbidities 3-5 Performance deficits that result in activity limitations and/or participation restrictions.    Minimal to moderate modification of assessment Moderate complexity, which includes analysis of data from detailed assessments, and consideration of several treatment options.         Review of medical/therapy records and extensive additional review of physical, cognitive and psychosocial history.  Comorbidities affect occupational performance 5 or more Performance deficits that result in activity limitations and/or participation restrictions.    Significant modification of assessment High complexity, analysis of  Occupational profile and data,  Comprehensive assessments, multiple treatment options.            Mavis SEVILLA  Oral  11/22/2019  12:40 PM

## 2021-06-03 NOTE — TELEPHONE ENCOUNTER
We set up refills and I will sign.  If she continues to have symptoms of acid reflux despite antacid, I would recommend that she see a gastroenterologist.  If she is interested in this please set up referral and I will sign

## 2021-06-03 NOTE — PROGRESS NOTES
Office Visit - Follow Up   Verena Lagunas   64 y.o. female    Date of Visit: 11/7/2019    Chief Complaint   Patient presents with     Cough        Assessment and Plan   1. Cough  I suspect this is likely related to underlying smoking and chronic bronchitis.  We will repeat spirometry today have her start Symbicort as needed, x-ray.  Continue H2 blocker, nasal steroid and antihistamine.  - Spirometry without bronchodilator  - XR Chest 2 Views    2. COPD  Moderate obstruction shown on previous spirometry, repeat spirometry now, use Symbicort as needed  - budesonide-formoterol (SYMBICORT) 80-4.5 mcg/actuation inhaler; Inhale 1-2 puffs every 6 (six) hours as needed.  Dispense: 1 Inhaler; Refill: 12    3. Tobacco abuse  Cessation advised    4. Lung nodules - annual screen due 6/2019  He is going to schedule her follow-up, order has already been placed    5. Gastroesophageal reflux disease with esophagitis  - famotidine (PEPCID) 20 MG tablet; Take 1 tablet (20 mg total) by mouth 2 (two) times a day.  Dispense: 180 tablet; Refill: 3    6. Vertigo  - Ambulatory referral to PT/OT    7. Need for prophylactic vaccination and inoculation against influenza  - Influenza High Dose, Seasonal 65+ yrs    Return in about 4 weeks (around 12/5/2019) for recheck.     History of Present Illness   This 64 y.o. old woman comes in for follow-up.  She has ongoing cough.  Is worse at night and when she is sleeping.  Better during the day.  She is using Flonase and Claritin.  She has some mild dyspnea on exertion.  She continues to smoke.  No hemoptysis.  No fever chills.  Some sinus congestion.  Some dizziness/vertigo has been seeing Dr. Pankaj Santiago, now trying to do some vestibular rehab but apparently Dr. Santiago is not currently available.  Saw pulmonary in 2017, had spirometry with probable evidence of moderate obstruction read normal by her pulmonologist.  Not currently using inhalers.    Review of Systems: A comprehensive review  "of systems was negative except as noted.     Medications, Allergies and Problem List   Reviewed, reconciled and updated  Post Discharge Medication Reconciliation Status:      Physical Exam   General Appearance:   No acute distress    /72 (Patient Site: Right Arm, Patient Position: Sitting, Cuff Size: Adult Regular)   Pulse 92   Ht 5' 5\" (1.651 m)   Wt 123 lb (55.8 kg)   SpO2 96%   BMI 20.47 kg/m      HEENT exam is unremarkable  Neck supple no thyromegaly or nodule palpable  Lymphatic no cervical lymphadenopathy  Cardiovascular regular rate and rhythm no murmur gallop or rub  Pulmonary somewhat distant lung sounds otherwise clear  Gastrointestinal abdomen soft nontender nondistended no organomegaly  Neurologic exam is non focal  Psychiatric pleasant, no confusion or agitation        Additional Information   Current Outpatient Medications   Medication Sig Dispense Refill     aspirin 81 MG EC tablet Take 81 mg by mouth daily.       atorvastatin (LIPITOR) 20 MG tablet Take 1 tablet (20 mg total) by mouth daily. 90 tablet 3     calcium-vitamin D (CALCIUM-VITAMIN D) 500 mg(1,250mg) -200 unit per tablet Take 1 tablet by mouth daily. 100 tablet 2     cholecalciferol, vitamin D3, (VITAMIN D3) 1,000 unit capsule Take 1 capsule (1,000 Units total) by mouth daily.  0     lisinopril-hydrochlorothiazide (PRINZIDE,ZESTORETIC) 10-12.5 mg per tablet Take 1 tablet by mouth daily. 90 tablet 3     loratadine (CLARITIN) 10 mg tablet Take 1 tablet (10 mg total) by mouth daily. 90 tablet 3     budesonide-formoterol (SYMBICORT) 80-4.5 mcg/actuation inhaler Inhale 1-2 puffs every 6 (six) hours as needed. 1 Inhaler 12     famotidine (PEPCID) 20 MG tablet Take 1 tablet (20 mg total) by mouth 2 (two) times a day. 180 tablet 3     No current facility-administered medications for this visit.      Allergies   Allergen Reactions     Cat Hair Std Allergenic Ext      Penicillins Shortness Of Breath     Social History     Tobacco Use     " Smoking status: Current Every Day Smoker     Packs/day: 0.50     Types: Cigarettes     Smokeless tobacco: Never Used   Substance Use Topics     Alcohol use: No     Comment: rare     Drug use: No       Review and/or order of clinical lab tests:  Review and/or order of radiology tests:  Review and/or order of medicine tests:  Discussion of test results with performing physician:  Decision to obtain old records and/or obtain history from someone other than the patient:  Review and summarization of old records and/or obtaining history from someone other than the patient and.or discussion of case with another health care provider:  Independent visualization of image, tracing or specimen itself:    Time:      Tomasz Beltran MD

## 2021-06-04 VITALS
OXYGEN SATURATION: 98 % | WEIGHT: 122 LBS | SYSTOLIC BLOOD PRESSURE: 112 MMHG | BODY MASS INDEX: 20.33 KG/M2 | HEART RATE: 89 BPM | DIASTOLIC BLOOD PRESSURE: 62 MMHG | HEIGHT: 65 IN

## 2021-06-04 VITALS
DIASTOLIC BLOOD PRESSURE: 62 MMHG | WEIGHT: 116 LBS | BODY MASS INDEX: 19.33 KG/M2 | SYSTOLIC BLOOD PRESSURE: 110 MMHG | HEART RATE: 99 BPM | OXYGEN SATURATION: 99 % | HEIGHT: 65 IN

## 2021-06-04 VITALS
DIASTOLIC BLOOD PRESSURE: 72 MMHG | SYSTOLIC BLOOD PRESSURE: 124 MMHG | OXYGEN SATURATION: 98 % | WEIGHT: 117 LBS | HEART RATE: 86 BPM | TEMPERATURE: 98.2 F | BODY MASS INDEX: 19.47 KG/M2

## 2021-06-04 VITALS
HEART RATE: 99 BPM | SYSTOLIC BLOOD PRESSURE: 138 MMHG | OXYGEN SATURATION: 98 % | DIASTOLIC BLOOD PRESSURE: 66 MMHG | BODY MASS INDEX: 19.99 KG/M2 | WEIGHT: 120 LBS | HEIGHT: 65 IN

## 2021-06-04 NOTE — PROGRESS NOTES
"  Office Visit - Follow Up   Verena Lagunas   64 y.o. female    Date of Visit: 12/11/2019    Chief Complaint   Patient presents with     Cough        Assessment and Plan   1. Chronic cough  She does not like Flonase or inhalers.  I think her cough could be related to daily smoking.  She does state that Robitussin and cough drops help and I recommended this.  I also asked her to follow-up with her lung cancer screening testing nodule follow-up which has been ordered    2. COPD  Did not like her inhaler    3. Tobacco abuse  Cessation advised she is going to try patches    4. Lung nodules - annual screen due 6/2019  As above    Return in about 4 weeks (around 1/8/2020) for recheck.     History of Present Illness   This 64 y.o. old woman comes in for evaluation of cough.  This is been a chronic problem for her.  We have tried numerous things including Flonase, reflux medication, inhalers.  She thinks all of them make her cough worse and I had actually started on an inhaler and cough worse and she stopped inhaling things seem to improve.  Then this morning she woke up with a cough again.  She does have lung nodules and is a smoker.  I have recommended and ordered a CT for lung cancer screening purposes in follow-up of her lung nodules.  She has not yet gotten this done.  She does continue to smoke and is interested in quitting but it is hard.  She has patches at home and now is the modality she would like to use to quit.    Review of Systems: A comprehensive review of systems was negative except as noted.     Medications, Allergies and Problem List   Reviewed, reconciled and updated  Post Discharge Medication Reconciliation Status:      Physical Exam   General Appearance:   No acute distress    /62 (Patient Site: Right Arm, Patient Position: Sitting, Cuff Size: Adult Regular)   Pulse 89   Ht 5' 5\" (1.651 m)   Wt 122 lb (55.3 kg)   SpO2 98%   BMI 20.30 kg/m      HEENT exam is unremarkable  Neck supple no " thyromegaly or nodule palpable  Lymphatic no cervical lymphadenopathy  Cardiovascular regular rate and rhythm no murmur gallop or rub  Pulmonary lungs are clear to auscultation bilaterally  Gastrointestinal abdomen soft nontender nondistended no organomegaly  Neurologic exam is non focal  Psychiatric pleasant, no confusion or agitation        Additional Information   Current Outpatient Medications   Medication Sig Dispense Refill     aspirin 81 MG EC tablet Take 81 mg by mouth daily.       atorvastatin (LIPITOR) 20 MG tablet Take 1 tablet (20 mg total) by mouth daily. 90 tablet 3     calcium-vitamin D (CALCIUM-VITAMIN D) 500 mg(1,250mg) -200 unit per tablet Take 1 tablet by mouth daily. 100 tablet 2     cholecalciferol, vitamin D3, (VITAMIN D3) 1,000 unit capsule Take 1 capsule (1,000 Units total) by mouth daily.  0     famotidine (PEPCID) 20 MG tablet Take 1 tablet (20 mg total) by mouth 2 (two) times a day. 180 tablet 3     lisinopril-hydrochlorothiazide (PRINZIDE,ZESTORETIC) 10-12.5 mg per tablet Take 1 tablet by mouth daily. 90 tablet 3     loratadine (CLARITIN) 10 mg tablet Take 1 tablet (10 mg total) by mouth daily. 90 tablet 3     No current facility-administered medications for this visit.      Allergies   Allergen Reactions     Cat Hair Std Allergenic Ext      Penicillins Shortness Of Breath     Social History     Tobacco Use     Smoking status: Current Every Day Smoker     Packs/day: 0.50     Types: Cigarettes     Smokeless tobacco: Never Used   Substance Use Topics     Alcohol use: No     Comment: rare     Drug use: No       Review and/or order of clinical lab tests:  Review and/or order of radiology tests:  Review and/or order of medicine tests:  Discussion of test results with performing physician:  Decision to obtain old records and/or obtain history from someone other than the patient:  Review and summarization of old records and/or obtaining history from someone other than the patient and.or  discussion of case with another health care provider:  Independent visualization of image, tracing or specimen itself:    Time:      Tomasz Beltran MD

## 2021-06-04 NOTE — PROGRESS NOTES
Discharge Summary  Patient Name: Verena Lagunas  Date: 2/10/2020  Referral Diagnosis: dizziness  Referring provider: Tomasz Beltran MD  Visit Diagnosis:   1. Dizziness     2. Unsteadiness on feet     3. Decreased activities of daily living (ADL)         Goal status: goals met  Patient will be independent with home exercise program in: 4 weeks  Patient will be able to walk: with head motion;on uneven/inclined surfaces;without loss of balance;in 12 weeks  Patient will bend: to dress;to clean;without dizziness;without loss of balance;in 12 weeks  Patient will ascend / descend: stairs/curbs;without loss of balance;in 12 weeks  Patient will turn head: without dizziness;for conversation;in 12 weeks  Patient will look up / down: without dizziness;for drinking;in 12 weeks      Patient was seen for 3 visits between 11-22-19 and 12-17-19.    Therapy will be discontinued at this time.  The patient will need a new referral to resume.    Thank you for your referral.  Mavis SEVILLA Mixon  2/10/2020   1:25 PM    [facility] Daily Progress     Patient Name: Verena Lagunas  Date: 12/17/2019  Visit #: 3  Referral Diagnosis: dizziness  Referring provider: Tomasz Beltran MD  Visit Diagnosis:     ICD-10-CM    1. Dizziness R42    2. Unsteadiness on feet R26.81    3. Decreased activities of daily living (ADL) Z78.9          Assessment:     Patient is appropriate to continue with skilled occupational therapy intervention, as indicated by initial plan of care. She reports not having much dizziness outside of performing the exercises. Patient instructed to taper down the exercises and see if symptoms remain absent.    Goal Status:  Patient will be independent with home exercise program in: 4 weeks  Patient will be able to walk: with head motion;on uneven/inclined surfaces;without loss of balance;in 12 weeks  Patient will bend: to dress;to clean;without dizziness;without loss of balance;in 12 weeks  Patient will  ascend / descend: stairs/curbs;without loss of balance;in 12 weeks  Patient will turn head: without dizziness;for conversation;in 12 weeks  Patient will look up / down: without dizziness;for drinking;in 12 weeks      Plan / Patient Education:     Continue with initial plan of care. Progress with home program as tolerated.    Subjective:     Pain Ratin    Subjective progress relative to last visit:  Intensity of dizziness is:  less  Frequency of dizziness is: less  Duration of dizziness is: less  Balance is:  better    Objective:     Positional Tests: NT    Plan for next visit: progress HEP    Treatment Today:   Patient is feeling better and reports improved balance. Reviewed HEP. Patient will taper the exercises down every 2 weeks until she is no longer performing them at the end of January.   X1 viewing-5 seconds-continue  Gait with head motion-continue  Normal surface eyes closed-continue but patient instructed to find a slightly perturbed surface to increase challenge  TREATMENT MINUTES COMMENTS   Evaluation     Self-care/ Home management     Neuromuscular Re-education 25    Canalith repositioning procedure           Total 25    Blank areas are intentional and mean the treatment did not include these items.          Mavis Mixon  2019  10:15 AM

## 2021-06-05 VITALS
WEIGHT: 126 LBS | OXYGEN SATURATION: 97 % | DIASTOLIC BLOOD PRESSURE: 74 MMHG | HEART RATE: 83 BPM | SYSTOLIC BLOOD PRESSURE: 112 MMHG | HEIGHT: 65 IN | BODY MASS INDEX: 20.99 KG/M2

## 2021-06-05 VITALS
HEIGHT: 65 IN | OXYGEN SATURATION: 98 % | HEART RATE: 64 BPM | BODY MASS INDEX: 19.99 KG/M2 | SYSTOLIC BLOOD PRESSURE: 128 MMHG | DIASTOLIC BLOOD PRESSURE: 62 MMHG | WEIGHT: 120 LBS

## 2021-06-05 VITALS
DIASTOLIC BLOOD PRESSURE: 72 MMHG | HEART RATE: 94 BPM | WEIGHT: 126.56 LBS | SYSTOLIC BLOOD PRESSURE: 120 MMHG | RESPIRATION RATE: 18 BRPM | HEIGHT: 65 IN | BODY MASS INDEX: 21.09 KG/M2 | OXYGEN SATURATION: 96 %

## 2021-06-06 NOTE — TELEPHONE ENCOUNTER
Call from pt with [facility] .  Pt calling with C/O cough  Call dropped on  The interpreters phone system, she was unable to reach pt & will keep attempting.  Unable to triage.    Lauren Conde RN  [facility] Nurse Advisor

## 2021-06-06 NOTE — TELEPHONE ENCOUNTER
RN triage   Call from pt w/ sign/relay  pt states she left My Chart message and has not heard back --   Reviewed My Chart message in chart from yesterday --   Pt states she has cough and dry mouth and tingling throat   Takes robitussin Q night -- and using Flonase and takes ASA Q day   No chest pain   No fever   Has some upper back/between shoulder pain - comes and goes  Cough worse at night -- usually dry   Cough for the past week  -- no blood - no phlegm   Has runny nose in AM -- pt attributes to allergies   Breathing OK   Pt is a smoker and states she thinks cough worse due to smoking   Pt states she plans on quitting smoking -- has patches -- not using patches now   Pt concerned about using patches and Flonase  No sore throat -- no difficulty swallowing or drinking   Pt states she has missed work since Monday -- gets dizzy when goes outside -- feels like losing balance -- no falls  Pt states she has Meniere's   Pt states she feels dizzy now -- not outside   No nausea or vomiting or diarrhea   No palpitations   Drinking and eating well and U.O. gd  No headache   Has ear pain from Meniere's   Per protocol = should be seen -- pt states diff to get to office   Pt wants PCP advice   1.  Pt requesting prescription for cough medicine   2.  Pt wants to know if OK to use patches and Flonase ?   Other advise ?:  Kalee Todd RN [facility] RN triage            Reason for Disposition    Cough with no complications    Lightheadedness (dizziness) present now, after 2 hours of rest and fluids    Protocols used: COUGH-A-OH, DIZZINESS-A-OH

## 2021-06-06 NOTE — TELEPHONE ENCOUNTER
As I mentioned in the [employer] message, she has been complaining of reflux, which can lead to a cough.  I'd recommend that she start famotidine 20mg twice daily.  I'd also like her to start Flonase and it is ok to use this with nicotine patches.  There is no cough syrup that is going to take away this cough

## 2021-06-07 NOTE — TELEPHONE ENCOUNTER
Much less coughing but I take one or two tums - calcium carbonate antacid - ultra 2000 peppermint daily. Not enough? Should I take after meals?          I stopped take nasal spray almost two weeks, but allergic sinus comes back. Should I take again.          Dr. Beltran  Please see above and advise.

## 2021-06-07 NOTE — TELEPHONE ENCOUNTER
Dr. Beltran  Ok to change the number of episode to 10 times per month instead of 3 times per month?

## 2021-06-07 NOTE — PROGRESS NOTES
Provider E-Visit time total (minutes): 6 caesar    Patient with persistent cough.  Smoker.  Intolerant of a lot of medication.  Seems to have some reflux.  Now taking calcium-based antacid.  Stopped Flonase and nasal symptoms returned, encouraged to resume Flonase.  Has some paperwork to fill out for work and we will try and get a hold of this and help her out as much as we can.    Tomasz Beltran

## 2021-06-08 NOTE — TELEPHONE ENCOUNTER
Refill Approved    Rx renewed per Medication Renewal Policy. Medication was last renewed on 3/26/19.    Lauren Edmond, [facility] Triage/Med Refill 5/18/2020     Requested Prescriptions   Pending Prescriptions Disp Refills     fluticasone propionate (FLONASE) 50 mcg/actuation nasal spray [Pharmacy Med Name: FLUTICASONE 50MCG NASAL SP (120) RX] 16 g 11     Sig: SHAKE LIQUID AND USE 1 SPRAY IN EACH NOSTRIL DAILY       Nasal Steroid Refill Protocol Passed - 5/15/2020  9:49 AM        Passed - Patient has had office visit/physical in last 2 years     Last office visit with prescriber/PCP: 12/11/2019 OR same dept: 12/11/2019 Tomasz Beltran MD OR same specialty: 12/11/2019 Tomasz Beltran MD Last physical: 9/3/2019 Last MTM visit: Visit date not found    Next appt within 3 mo: Visit date not found  Next physical within 3 mo: Visit date not found  Prescriber OR PCP: Tomasz Beltran MD  Last diagnosis associated with med order: 1. Allergic rhinitis  - fluticasone propionate (FLONASE) 50 mcg/actuation nasal spray [Pharmacy Med Name: FLUTICASONE 50MCG NASAL SP (120) RX]; SHAKE LIQUID AND USE 1 SPRAY IN EACH NOSTRIL DAILY  Dispense: 16 g; Refill: 11    2. Mixed hyperlipidemia  - atorvastatin (LIPITOR) 20 MG tablet [Pharmacy Med Name: ATORVASTATIN 20MG TABLETS]; TAKE 1 TABLET(20 MG) BY MOUTH DAILY  Dispense: 90 tablet; Refill: 3     If protocol passes may refill for 12 months if within 3 months of last provider visit (or a total of 15 months).                 atorvastatin (LIPITOR) 20 MG tablet [Pharmacy Med Name: ATORVASTATIN 20MG TABLETS] 90 tablet 3     Sig: TAKE 1 TABLET(20 MG) BY MOUTH DAILY       Statins Refill Protocol (Hmg CoA Reductase Inhibitors) Passed - 5/15/2020  9:49 AM        Passed - PCP or prescribing provider visit in past 12 months      Last office visit with prescriber/PCP: 12/11/2019 Tomasz Beltran MD OR same dept: 12/11/2019 Tomasz Beltran MD OR same specialty:  12/11/2019 Tomasz Beltran MD  Last physical: 9/3/2019 Last MTM visit: Visit date not found   Next visit within 3 mo: Visit date not found  Next physical within 3 mo: Visit date not found  Prescriber OR PCP: Tomasz Beltran MD  Last diagnosis associated with med order: 1. Allergic rhinitis  - fluticasone propionate (FLONASE) 50 mcg/actuation nasal spray [Pharmacy Med Name: FLUTICASONE 50MCG NASAL SP (120) RX]; SHAKE LIQUID AND USE 1 SPRAY IN EACH NOSTRIL DAILY  Dispense: 16 g; Refill: 11    2. Mixed hyperlipidemia  - atorvastatin (LIPITOR) 20 MG tablet [Pharmacy Med Name: ATORVASTATIN 20MG TABLETS]; TAKE 1 TABLET(20 MG) BY MOUTH DAILY  Dispense: 90 tablet; Refill: 3    If protocol passes may refill for 12 months if within 3 months of last provider visit (or a total of 15 months).                fluticasone (FLONASE) 50 mcg/actuation nasal spray [[id number]]     Electronically signed by: Tomasz Beltran MD on 03/26/19 1444  Status: Discontinued    Ordering user: Tomasz Beltran MD 03/26/19 1444  Authorized by: Tomasz Beltran MD    Frequency: DAILY 03/26/19 - 09/18/19  Discontinued by: Tomasz Beltran MD 09/18/19 1600

## 2021-06-09 NOTE — TELEPHONE ENCOUNTER
Okay for famotidine 20 mg once daily.  She can take this with her meal or 30 minutes before her meal.  #90 with 3 refills

## 2021-06-09 NOTE — PROGRESS NOTES
"  Office Visit - Follow Up   Verena Lagunas   65 y.o. female    Date of Visit: 6/24/2020    Chief Complaint   Patient presents with     Gastroesophageal Reflux     Headache     Dizziness        Assessment and Plan   1. Hypertension  Blood pressure ok, continue current meds    2. Right ear pain  Etiology uncertain, ? TMJ, teeth grinding, reflux, other?  - Ambulatory referral to ENT    3. Gastroesophageal reflux disease without esophagitis  Restart pepcid    4. Tobacco abuse  Cessation again advised.  More motivated d/t covid    5. Deafness, unspecified laterality   present    6. Chronic vertigo  Stable, f/u with ENT, further testing as recommended    7. Lung nodules - annual screen due 6/2019  She will call to schedule CT    8. Screen for colon cancer  - Cologuard      Return in about 3 months (around 9/24/2020) for annual physical.     History of Present Illness   This 65 y.o. old woman comes in for follow-up.  Overall doing okay.  Lots of stress related to COVID.  Having to going to work.  Waking up in the morning with right ear pain which lasts just a short period.  Associated sore throat and slight cough.  Chronic cough.  Has some acid reflux.  Smokes cigarettes.  Some lung nodules for which she needs follow-up lung CT.  Ongoing chronic vertigo which she saw Groevr mccormack, additional testing recommended which she did not complete.  Not taking acid reflux medication.    Review of Systems: A comprehensive review of systems was negative except as noted.     Medications, Allergies and Problem List   Reviewed, reconciled and updated  Post Discharge Medication Reconciliation Status:      Physical Exam   General Appearance:   No acute distress    /66 (Patient Site: Left Arm, Patient Position: Sitting, Cuff Size: Adult Regular)   Pulse 99   Ht 5' 5\" (1.651 m)   Wt 120 lb (54.4 kg)   SpO2 98%   BMI 19.97 kg/m      HEENT exam is unremarkable, ear exam is unremarkable  Neck supple no thyromegaly or " nodule palpable  Lymphatic no cervical lymphadenopathy  Cardiovascular regular rate and rhythm no murmur gallop or rub  Pulmonary lungs are clear to auscultation bilaterally  Gastrointestinal abdomen soft nontender nondistended no organomegaly  Neurologic exam is non focal with the exception of deafness  Psychiatric pleasant, no confusion or agitation        Additional Information   Current Outpatient Medications   Medication Sig Dispense Refill     aspirin 81 MG EC tablet Take 81 mg by mouth daily.       atorvastatin (LIPITOR) 20 MG tablet TAKE 1 TABLET(20 MG) BY MOUTH DAILY 90 tablet 3     calcium-vitamin D (CALCIUM-VITAMIN D) 500 mg(1,250mg) -200 unit per tablet Take 1 tablet by mouth daily. 100 tablet 2     cholecalciferol, vitamin D3, (VITAMIN D3) 1,000 unit capsule Take 1 capsule (1,000 Units total) by mouth daily.  0     famotidine (PEPCID) 20 MG tablet Take 1 tablet (20 mg total) by mouth 2 (two) times a day. 180 tablet 3     fluticasone propionate (FLONASE) 50 mcg/actuation nasal spray Shake liquid and use 1 spray in each nostril daily. 16 g 12     lisinopril-hydrochlorothiazide (PRINZIDE,ZESTORETIC) 10-12.5 mg per tablet Take 1 tablet by mouth daily. 90 tablet 3     loratadine (CLARITIN) 10 mg tablet Take 1 tablet (10 mg total) by mouth daily. 90 tablet 3     No current facility-administered medications for this visit.      Allergies   Allergen Reactions     Cat Hair Std Allergenic Ext      Penicillins Shortness Of Breath     Social History     Tobacco Use     Smoking status: Current Every Day Smoker     Packs/day: 0.50     Types: Cigarettes     Smokeless tobacco: Never Used   Substance Use Topics     Alcohol use: No     Comment: rare     Drug use: No       Review and/or order of clinical lab tests:  Review and/or order of radiology tests:  Review and/or order of medicine tests:  Discussion of test results with performing physician:  Decision to obtain old records and/or obtain history from someone other  than the patient:  Review and summarization of old records and/or obtaining history from someone other than the patient and.or discussion of case with another health care provider:  Independent visualization of image, tracing or specimen itself:    Time:      Tomasz Beltran MD

## 2021-06-09 NOTE — TELEPHONE ENCOUNTER
Dr. Beltran  Do you want her to take this?  I don't see that you ordered it.  How should she take it?

## 2021-06-10 NOTE — TELEPHONE ENCOUNTER
It is my medical opinion that Verena Lagunas should work 3 days a week due to symptoms of vertigo.

## 2021-06-10 NOTE — PROGRESS NOTES
HPI: This patient is a 64yo F who presents for evaluation of dizziness at the request of Dr. Beltran. She is deaf and uses an . She was told years ago that she had Meniere's, although she was evaluated by Dr. Santiago at [facility] about a year ago and he did not state that he thought she had Meniere's. She has no rotary vertigo and states that she never has. She has some lightheadedness, but mostly just an instability. Denies aural fullness, changes in tinnitus, oscillopsia, and vertigo. She was referred to [facility] for further testing and rehab, but she never went because of transportation issues. She was supposed to start working with vestibular OT through [facility], but again didn't go because of covid and transportation issues, although in our discussions today, it does not really seem that she tried much at all to overcome the transportation problem.      Past medical history, surgical history, social history, family history, medications, and allergies have been reviewed with the patient and are documented above.    Review of Systems: a 10-system review was performed. Pertinent positives are noted in the HPI and on a separate scanned document in the chart.    PHYSICAL EXAMINATION:  GEN: no acute distress, normocephalic  EYES: extraocular movements are intact, pupils are equal and round. Sclera clear.   EARS: auricles are normally formed. The external auditory canals are clear with minimal to no cerumen. Tympanic membranes are intact bilaterally with no signs of infection, effusion, retractions, or perforations.  NOSE: anterior nares are patent.   OC/OP: clear, dentition is in good repair. The tongue and palate are fully mobile and symmetric. No masses or lesions.  NECK: soft and supple. No lymphadenopathy or masses. Airway is midline.  NEURO: CN VII and XII symmetric. alert and oriented. Gait is normal.  PULM: breathing comfortably on room air, normal chest expansion with respiration  CARDS: no cyanosis or  clubbing, normal carotid pulses  VESTIBULAR: No spontaneous nystagmus.     AUDIOGRAM: patient declined    MEDICAL DECISION-MAKING: Verena is a 66yo F with imbalance, not Meniere's. She was referred to [facility] by my former partner and Neurootologist, Dr. Santiago, which is appropriate. Informed her that her imbalance is not likely to be otologic, despite the fact that she has an ear issue (deafness). She is more willing to continue with vestibular therapy here, provided our therapist can go to her at [facility] when it reopens. Explained that there isn't an ENT solution to her symptoms and that therapies are the most apporpriate.

## 2021-06-10 NOTE — TELEPHONE ENCOUNTER
"RN Triage  Verena calling today with assistance of . She says she saw Dr. Beltran on June 24th, explained she was having issues with acid reflux, prescribed pepcid 1x/day. She says recently she feels it has made her experience vertigo type sensations and has even affected her mood. She usually takes Pepcid 30 minutes to 1 hour before dinner every night. These symptoms have started just since beginning the pepcid, but seem to be getting worse.  Also reports some sinus difficulty for which she takes flonase OTC, and some jaw line soreness. No fevers.   No fainting. Feeling loss of balance at times. Has noticed sometimes some heart rate variations- this morning felt like heart was beating very fast. This was correlated with her dizziness. Smoking sometimes makes vertigo worse. Having more anxiety due to COVID which has made quitting smoking difficult. Has had some pressure in her ears and up to her forehead along with some ear pain. Was trying to reach out to ENT specialist- but was unable to get through, I provided ENT specialty scheduling number to her again as it is noted a referral was provided.    Because of Verena's symptoms of vertigo, sinus pressure/ear pain, and occasional heart rate concerns, I recommended she be evaluated in clinic today. Dr Beltran's schedule is full, she would really prefer to only see/speak with him, states \"if I saw someone else I feel like I may be starting from scratch again.\"  She understands to call back if symptoms worsen but would like to see what Dr. Beltran's recommendations would be regarding her symptoms. I will send message to clinic for review.    Please review and advise Verena, she says OK to leave message.     Dayana Moscoso RN  [facility] Nurse Advisor      Additional Information    Negative: Shock suspected (e.g., cold/pale/clammy skin, too weak to stand, low BP, rapid pulse)    Negative: Difficult to awaken or acting confused (e.g., disoriented, " slurred speech)    Negative: Fainted, and still feels dizzy afterwards    Negative: SEVERE difficulty breathing (e.g., struggling for each breath, speaks in single words)    Negative: Overdose (accidental or intentional) of medications    Negative: New neurologic deficit that is present now: * Weakness of the face, arm, or leg on one side of the body * Numbness of the face, arm, or leg on one side of the body * Loss of speech or garbled speech    Negative: Heart beating < 50 beats per minute OR > 140 beats per minute    Negative: Sounds like a life-threatening emergency to the triager    Negative: SEVERE dizziness (e.g., unable to stand, requires support to walk, feels like passing out now)    Negative: SEVERE headache or neck pain    Negative: Spinning or tilting sensation (vertigo) present now and one or more stroke risk factors (i.e., hypertension, diabetes, prior stroke/TIA, heart attack, age over 60) (Exception: prior physician evaluation for this AND no different/worse than usual)    Negative: Loss of vision or double vision    Negative: Difficulty breathing    Negative: Drinking very little and has signs of dehydration (e.g., no urine > 12 hours, very dry mouth, very lightheaded)    Negative: Follows bleeding (e.g., stomach, rectum, vagina) (Exception: became dizzy from sight of small amount blood)    Negative: Patient sounds very sick or weak to the triager    Extra heart beats OR irregular heart beating (i.e., 'palpitations')     Not currently.    Protocols used: DIZZINESS-A-OH

## 2021-06-10 NOTE — TELEPHONE ENCOUNTER
RN cannot approve Refill Request    RN can NOT refill this medication PCP messaged that patient is overdue for Labs. Last office visit: 6/24/2020 Tomasz Beltran MD Last Physical: 9/3/2019 Last MTM visit: Visit date not found Last visit same specialty: 6/24/2020 Tomasz Beltran MD.  Next visit within 3 mo: Visit date not found  Next physical within 3 mo: Visit date not found      Amaris Garvey, [facility] Triage/Med Refill 8/10/2020    Requested Prescriptions   Pending Prescriptions Disp Refills     lisinopriL-hydrochlorothiazide (PRINZIDE,ZESTORETIC) 10-12.5 mg per tablet [Pharmacy Med Name: LISINOPRIL-HCTZ 10/12.5MG TABLETS] 90 tablet 3     Sig: TAKE 1 TABLET BY MOUTH EVERY DAY       Diuretics/Combination Diuretics Refill Protocol  Failed - 8/9/2020  5:26 AM        Failed - Serum Sodium in past 12 months      No results found for: LN-SODIUM          Passed - Visit with PCP or prescribing provider visit in past 12 months     Last office visit with prescriber/PCP: 6/24/2020 Tomasz Beltran MD OR same dept: 6/24/2020 Tomasz Beltran MD OR same specialty: 6/24/2020 Tomasz Beltran MD  Last physical: 9/3/2019 Last MTM visit: Visit date not found   Next visit within 3 mo: Visit date not found  Next physical within 3 mo: Visit date not found  Prescriber OR PCP: Tomasz Beltran MD  Last diagnosis associated with med order: 1. Hypertension  - lisinopriL-hydrochlorothiazide (PRINZIDE,ZESTORETIC) 10-12.5 mg per tablet [Pharmacy Med Name: LISINOPRIL-HCTZ 10/12.5MG TABLETS]; TAKE 1 TABLET BY MOUTH EVERY DAY  Dispense: 90 tablet; Refill: 3    If protocol passes may refill for 12 months if within 3 months of last provider visit (or a total of 15 months).             Passed - Serum Potassium in past 12 months      Lab Results   Component Value Date    Potassium 4.6 09/18/2019             Passed - Blood pressure on file in past 12 months     BP Readings from Last 1 Encounters:   06/24/20  138/66             Passed - Serum Creatinine in past 12 months      Creatinine   Date Value Ref Range Status   09/18/2019 0.70 0.60 - 1.10 mg/dL Final

## 2021-06-10 NOTE — TELEPHONE ENCOUNTER
Who is requesting the letter?  Patient   Why is the letter needed? Patient is requesting a letter for reduction of work hours due her medical conditions . Patient states the letter that provided sent is one sentence her manger is not satisfied with the information written on the letter . Patient is requesting provider to be more specific and explanation about her medical conations.Why reduction of hours needed and how long the reduction should continue .  How would you like this letter returned? Patient requested to send through my chart .  Okay to leave a detailed message? No

## 2021-06-10 NOTE — TELEPHONE ENCOUNTER
Agree with letter, it would be helpful to know what she wants to do in terms of work and then we could fill out the letter accordingly

## 2021-06-10 NOTE — PROGRESS NOTES
[facility] Clinic Note  Verean Lagunas   65 y.o. female    Date of Visit: 8/10/2020  Chief Complaint   Patient presents with     GI Problem     Reflux - was advised to take pepicd with food however sxs's worsens (nauses, more anxiety)     Sinus Problem     typically using flonase/claritin for sxs's but has stopped using this due to being nausea as well      Assessment/Plan  1. Generalized anxiety disorder  Not adherent with recommendation to consider psychotherapy and/or pharmacotherapy.  She was adamant that she would like to manage her symptoms through her  and her dog. Forwarded a message to Dr. Beltran regarding her request to have her work hours restricted and to only work from home.    2. Colon cancer screening  5. Gastroesophageal reflux disease with esophagitis  Never had a colonoscopy, refused colonoscopy but was amenable to Cologuard, thus one placed today.  She declined resumption of Pepcid use and stated that she wanted to work with ENT and her anxiety before considering EGD/GI eval.  I am very concerned in setting of her chronic cigarette use, unresolving symptoms and unexplained weight loss.  - Cologuard    3. Tobacco abuse  4. Chronic cough  6. Sinusitis, unspecified chronicity, unspecified location  7. Allergic rhinitis, unspecified seasonality, unspecified trigger  8. Right ear pain  Likely more of an chronic allergic rhinosinusitis at play, with some postnasal drainage.  However will acknowledge chronic cigarette use. Counseled to consider resuming loratadine, which she declined.  Stated that she will continue to use her Flonase once daily.  Noted that ENT referral was placed in June.  Patient stated she would follow through with setting of his ENT appointment. Ms Lagunas noted that she had the phone number to contact the CT/radiology group regarding setting up her follow-up low-dose screening CT ordered in September 2019.  It is somewhat reassuring that chest radiograph  from November 2019 was negative for any acute abnormalities.    9. Chronic vertigo  Patient has not been adherent to recommendations to connect with the [facility] or with ENT.  Symptoms suggestive of BPPV.  Did not have sufficient time during the visit in light of her multitude of complaints to do a more thorough exam.  She noted that she would reach out to [facility] for vestibular canalith repositioning.  Placed a new referral for ambulatory OT      Much or all of the text in this note was generated through the use of Dragon Dictate voice-to-text software. Errors in spelling or words which seem out of context are unintentional. Sound alike errors, in particular, may have escaped editing  Rick Busby MD    Return if symptoms worsen or fail to improve.    Subjective  This 65 y.o. old female.  Presents with general for her .  Here to discuss number of chronic issues.  Sinus problems: States that she stopped taking loratadine despite the diagnosis of seasonal allergies.  Has been using Flonase with partial relief.  Mainly her maxillary sinuses.  Smokes half a pack a day.  Was planning to transition to a nicotine patch however with stress from work and the coronavirus, this does not happen.  States she does not feel well and wonders if she has sinusitis.  She has been smoking for over 40 years.  Endorses involuntary weight loss due to poor p.o. intake for her GI complaints as described below.  And also endorses night sweats which she attributes to menopause.  Uses Flonase once a day.  Denies tooth pain, endorses chronic right ear pain which she has been referred to ENT for.  States she did not follow through the ENT referral because of transportation and time issues in relation to her work and she and her  only having one car.  Also endorses her chronic dry cough.  GI problems: States she stopped taking Pepcid due to nausea  and anxiety.  States anxiety destroys her appetite and despite initial improvement with Pepcid, anxiety, spike after an hour.  Anxiety also causes her heart to race and she feels dizzy.  Once in a while Tums it worked but the anxiety takes over.  Also some palpitations  and chest discomfort which she attributes to reflux.  Endorses eating a nonfatty/spicy diet.  Has been referred to undergo CT screening chest scan but has not due to work and distance issues.  Denies hematemesis/hemoptysis.  Denies diabetes and from chart review A1c was 5.6%.  Vertiginous symptoms: Has been referred [facility] but did not follow through with referral due to dizziness issues.  States turning her head to the right makes her feel off balance.  Denies any fever, tremors.  States she did work with a different physical therapist named Mavis for her vertigo with good relief and symptom improvement.  Denies any falls.  Anxiety: She endorses prior recommendation for CBT and/or pharmacology for anxiety, both of which she refused due to reluctance to take medicati in addition she notes that her dog andon.  Her  are support systems for her but with her anxiety she just cannot handle it.  Once again refused any outside assistance today.  Is requesting this provider to write an order for her to work restricted hours at her job, and ideally to work from home because just going to work makes her feel anxious.  Scored 16 on ANTOINE 7 today    ROS A comprehensive review of systems was performed and was otherwise negative    Medications, allergies, and problem list were reviewed and updated    Exam  General appearance: Pleasant, nontoxic-appearing, no acute distress, alert and oriented x4, thin  Vitals:    08/10/20 0722   BP: 124/72   Pulse: 86   Temp: 98.2  F (36.8  C)   SpO2: 98%   EYES: Eyelids, conjunctiva, and sclera were normal. Pupils were normal. Cornea, iris, and lens were normal bilaterally.  HEAD, EARS,  NOSE, MOUTH, AND THROAT: Head and face were normal. Hearing was normal to voice. Right TM/external canal unremarkable  NECK: Neck appearance was normal.  No thyromegaly  RESPIRATORY: Bilaterally with no crackles, wheezing or rhonchi  CARDIOVASCULAR: Regular S1 and S2.  Radial pulses intact.  No edema.  GASTROINTESTINAL: NABS, soft, nontender including the anterior chest or epigastrium, nondistended, no hepatomegaly  SKIN/HAIR/NAILS: Skin color was normal.     NEUROLOGIC: Alert and oriented to person, place, time, and circumstance.    PSYCHIATRIC:  Mood and affect were flat. Poor eye contact. Irritable at times  Additional Information   Current Outpatient Medications   Medication Sig Dispense Refill     aspirin 81 MG EC tablet Take 81 mg by mouth daily.       atorvastatin (LIPITOR) 20 MG tablet TAKE 1 TABLET(20 MG) BY MOUTH DAILY 90 tablet 3     calcium-vitamin D (CALCIUM-VITAMIN D) 500 mg(1,250mg) -200 unit per tablet Take 1 tablet by mouth daily. 100 tablet 2     cholecalciferol, vitamin D3, (VITAMIN D3) 1,000 unit capsule Take 1 capsule (1,000 Units total) by mouth daily.  0     lisinopril-hydrochlorothiazide (PRINZIDE,ZESTORETIC) 10-12.5 mg per tablet Take 1 tablet by mouth daily. 90 tablet 3     famotidine (PEPCID) 20 MG tablet Take 1 tablet (20 mg total) by mouth daily. 90 tablet 0     fluticasone propionate (FLONASE) 50 mcg/actuation nasal spray Shake liquid and use 1 spray in each nostril daily. 16 g 12     loratadine (CLARITIN) 10 mg tablet Take 1 tablet (10 mg total) by mouth daily. 90 tablet 3     No current facility-administered medications for this visit.      Allergies   Allergen Reactions     Cat Hair Std Allergenic Ext      Penicillins Shortness Of Breath     Social History     Social History Narrative    [place]  Mynor.  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  No  children.       Social History     Tobacco Use     Smoking status: Current Every Day Smoker     Packs/day: 0.50     Types: Cigarettes     Smokeless tobacco: Never Used   Substance Use Topics     Alcohol use: No     Comment: rare     Drug use: No     Family History   Problem Relation Age of Onset     Breast cancer Maternal Aunt 75     Colon cancer Mother 79             Lung cancer Father 50             Other Brother         6 brothers and 1 sister     No Medical Problems Sister      Past Surgical History:   Procedure Laterality Date     OVARIAN CYST REMOVAL Right 1970   Time: total time spent with the patient was 40 minutes of which >50% was spent in counseling and coordination of care

## 2021-06-10 NOTE — TELEPHONE ENCOUNTER
Unable to reach patient due to phone numbers no longer in service or busy signal.  Called all 3 numbers listed under demographics.  If the patient calls, please relay message below from Dr. Beltran and help the patient to schedule with another provider if she would like.  Thank you.  Chloe LINTON CMA/LEILA....................1:33 PM

## 2021-06-10 NOTE — TELEPHONE ENCOUNTER
Unable to reach patient due to phone numbers no longer in service or busy signal.  Called all 3 numbers listed under demographics.  If the patient calls, please relay message below from Dr. Beltran and help the patient to schedule with another provider if she would like.  Thank you.  Chloe LINTON CMA/LEILA....................12:21 PM

## 2021-06-10 NOTE — TELEPHONE ENCOUNTER
I do think it would be a good idea for her to have an appointment.  Unfortunately my schedule is not allowing for this.  I know that we are considering opening up earlier in the morning starting August 10 and therefore I might be able to see her before 8:00 next week at some time.  If she needs to be seen sooner she could see a partner.  Please check with clinic manager regarding openings in the future week

## 2021-06-11 NOTE — PROGRESS NOTES
CCx: lung nodules and shortness of breath    HPI:    Verena Lagunas is a 62 year old female referred because of lung nodules.  Seen with the help of an interpeter for lenore jarrell.  She had undergone a screening CT scan in 1/2017 and found to have a 6 mm partially solid nodule.  She then had a follow up in CT on 6/6/17 which showed resolution of that 6 mm area but several other smaller nodules that were also previously identified on January CT.  She denies any weight loss or hemoptysis.  Is a smoker, 1/4 ppd, (1/2 ppd at the most) for approximately 40 years.   also a smoker.  Father was a heavy smoker and developed lung cancer in his 50s.      She also endorses  Congestion and a slightly productive cough.  Worse last year, didn't clear up with other antibiotics, but did eventually go away with a course of doxycycline.  Doesn't know if she wheezes as she is deaf.  Some nasal sprays caused headaches.  Still notices some cough, especially with pollen, cats, and cigarette smoke.  No difficulty with exertion, walks 10 minutes everyday between work and home without difficulty.  No chest pain.  + watery eyes.      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, some trouble at work  Hobbies: walking, cooking, baking  Pets: 1 dog  Tobacco Use: smoke 1/4 ppd,  smokes  [place]: [place] [facility], apartment, lots of dust, some difficulty at home  Family History: father: lung cancer, mother: colon cancer    PMH:  Past Medical History:   Diagnosis Date     Allergic rhinitis      Hypertension      Tobacco abuse        PSH:  Past Surgical History:   Procedure Laterality Date     OVARIAN CYST REMOVAL Right 1970       SH:  Social History     Social History     Marital status:      Spouse name: N/A     Number of children: N/A     Years of education: N/A     Occupational History     Not on file.     Social History Main Topics     Smoking status: Current Every Day Smoker      Packs/day: 0.50     Types: Cigarettes     Smokeless tobacco: Not on file     Alcohol use No      Comment: rare     Drug use: No     Sexual activity: Not on file     Other Topics Concern     Not on file     Social History Narrative    [place]  Myonr.  She works for the [employer] in the [employer], .  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  No children.         Family history:  Family History   Problem Relation Age of Onset     Breast cancer Maternal Aunt 75     Colon cancer Mother 79          Lung cancer Father 50          Other Brother      6 brothers and 1 sister     No Medical Problems Sister        ROS:  Review of Systems - History obtained from the patient  General ROS: negative  Psychological ROS: negative  ENT ROS: negative  Allergy and Immunology ROS: negative  Endocrine ROS: negative  Respiratory ROS: positive for - cough  negative for - hemoptysis, shortness of breath or tachypnea  Cardiovascular ROS: no chest pain or dyspnea on exertion  Gastrointestinal ROS: no abdominal pain, change in bowel habits, or black or bloody stools  Genito-Urinary ROS: no dysuria, trouble voiding, or hematuria  Musculoskeletal ROS: negative  Neurological ROS: no TIA or stroke symptoms  Dermatological ROS: negative      Current Meds:  Does not atake any medications currently.      Labs:  No results found for this or any previous visit (from the past 72 hour(s)).    I have personally reviewed all imaging and PFT data available pertinent to this visit.    Imaging studies:  Ct Chest Without Contrast    Result Date: 2017  CT CHEST WO CONTRAST 2017 1:50 PM INDICATION: Lung nodule, <1cm TECHNIQUE: Routine chest. Dose reduction techniques were used. IV CONTRAST: None COMPARISON: 01/10/2017 FINDINGS: LUNGS AND PLEURA: Biapical pleural thickening is unchanged and has benign appearance. Images are mildly compromised by  "respiratory motion. 3 mm subpleural nodule in the left costophrenic angle is unchanged. A 6 mm nodule previously seen in the right upper lobe cannot be identified on the exam today. A few additional tiny, 1-2 mm subpleural nodules are likely benign and are unchanged. The pleural spaces appear normal. MEDIASTINUM: No mediastinal or hilar lymphadenopathy. Calcified atherosclerotic plaque in the aortic arch. LIMITED UPPER ABDOMEN: Negative. MUSCULOSKELETAL: Negative.     CONCLUSION: 1.  Small pulmonary nodules are unchanged. REFERENCE: Guidelines for Management of Incidental Pulmonary Nodules Detected on CT Images: From the Fleischner Society 2017. Guidelines apply to incidental nodules in patients who are 35 years or older. Guidelines do not apply to lung cancer screening, patients with immunosuppression, or patients with known primary cancer. MULTIPLE NODULES Nodule size less than or equal to 6 mm Low-risk patients: No follow-up needed. High-risk patients: Optional follow-up at 12 months. Nodule size 6 mm or larger Low-risk patients: Follow-up CT at 3-6 months, then consider CT at 18-24 months. High-risk patients: Follow-up CT at 3-6 months, then at 18-24 months if no change. -Use most suspicious nodule as guide to management.       PFTs:  Spirometry 6/26/17  FEV1/FVC 71%  FEV1 1.76 L 66%  FVC 2.48L 72%    Physical Exam:  /74  Pulse 80  Resp 20  Ht 5' 6\" (1.676 m)  Wt 128 lb 1.6 oz (58.1 kg)  SpO2 100% Comment: RA  BMI 20.68 kg/m2  General - Well nourished  Ears/Mouth - TMs clear bilaterally,  OP pink moist, no thrush  Neck - no cervical lymphadenopathy  Lungs - Clear to ausculation bilaterally  CVS - regular rhythm with no murmurs, rubs or gallups  Abdomen - soft, NT, ND, NABS  Ext - no cyanosis, clubbing or edema  Skin - no rash  Psychology - alert and oriented, answers appropriate      Assessment and Plan:  1. Lung nodules - most recent CT scan showing resolution of the 6 mm semisolid nodule.  " Additionally, smaller < 4mm scattered subpleural nodules.  Given high risk with history of smoking, will repeat CT in 1 year to ensure stability.  If not growing, then would not do any more scans.  If growing, then will need continued follow up or intervention depending on size.    2. Allergic rhinitis - instructed to restart taking flonase as suspect it was helping a lot with her symptoms of cough and congestion.  Spirometry showing no evidence of obstructive disease.  Seemed to have little trouble with test, so the volumes may not be as high as they could be, but doubt there is an underlying obstructive disease.  Hold on inhalers and monitor.  If still having trouble at next visit, then would start albuterol.  Counseled on trigger recognition and avoidance.      F/U 1 year      Karlo Enciso MD  Pulmonary and Critical Care Medicine  [phone]

## 2021-06-11 NOTE — PROGRESS NOTES
Office Visit - Follow Up   Verena Lagunas   62 y.o. female    Date of Visit: 5/31/2017    Chief Complaint   Patient presents with     Abdominal Pain        Assessment and Plan   1. Lung nodule, solitary  She is due for her follow-up CT, actually a month overdue.  We will repeat the CT scan and I do want her to establish with pulmonary for ongoing management  - CT Chest Without Contrast; Future  - Ambulatory referral to Pulmonology    2. Diarrhea, unspecified type  Etiology uncertain.  She is going to get a colonoscopy for colon cancer screening.  In the meantime of suggested she try Metamucil.  We reviewed her labs from last visit and they are okay.  She has had no weight changes.  She had no blood in her stool.    3. COPD  Start on Spiriva or equivalent, smoking cessation, pulmonary evaluation    4. Deaf, unspecified laterality    5. Tobacco abuse  Cessation advised    Return in about 3 months (around 8/31/2017) for recheck.     History of Present Illness   This 62 y.o. old woman comes in with the aid of a .  She is here for follow-up.  She had a CT scan in January 2017 which showed a left upper lobe nodule and 3 month follow-up was recommended.  I had recommended she should pulmonary and she has not done this yet.  She does smoke and she has had some ongoing dyspnea with exertion.  This seems to be a little bit worse than when I last saw her.  Minimal cough.  No hemoptysis.  She does have some occasional diarrhea.  When she has diarrhea she has some anal discomfort.  Most the time she does a formed stool.  She has had no black stool no blood in the stool.  She is known vomiting.  No weight loss.  No fever chills or night sweats.    Review of Systems: A comprehensive review of systems was negative except as noted.     Medications, Allergies and Problem List   Reviewed and updated     Physical Exam   General Appearance:   No acute distress    /78 (Patient Site: Right Arm,  "Patient Position: Sitting, Cuff Size: Adult Regular)  Pulse 84  Ht 5' 6\" (1.676 m)  Wt 125 lb (56.7 kg)  SpO2 98%  BMI 20.18 kg/m2    Lungs clear to auscultation bilaterally cardiovascular regular rate and rhythm no murmur gallop or rub abdomen soft nontender nondistended no organomegaly.  She is no hot or swollen joints no joint deformity      Additional Information   Current Outpatient Prescriptions   Medication Sig Dispense Refill     fluticasone (FLONASE) 50 mcg/actuation nasal spray 2 sprays into each nostril daily. 10 g 11     tiotropium (SPIRIVA) 18 mcg inhalation capsule Place 1 capsule (2 puffs total) into inhaler and inhale daily. 90 capsule 3     No current facility-administered medications for this visit.      Allergies   Allergen Reactions     Cat Hair Std Allergenic Ext      Penicillins Shortness Of Breath     Social History   Substance Use Topics     Smoking status: Current Every Day Smoker     Packs/day: 0.50     Types: Cigarettes     Smokeless tobacco: None     Alcohol use No      Comment: rare       Review and/or order of clinical lab tests:  Review and/or order of radiology tests:  Review and/or order of medicine tests:  Discussion of test results with performing physician:  Decision to obtain old records and/or obtain history from someone other than the patient:  Review and summarization of old records and/or obtaining history from someone other than the patient and.or discussion of case with another health care provider:  Independent visualization of image, tracing or specimen itself:    Time:      Tomasz Beltran MD  "

## 2021-06-11 NOTE — PROGRESS NOTES
Assessment and Plan:   1. Routine general medical examination at a health care facility  This is a 65-year-old woman with history as discussed below    2. Vertigo  Continues to be quite symptomatic.  She is having a hard time working.  She would like to reduce her work schedule to three 8-hour days.  She is supposed to be seen in the vestibular rehab clinic at [facility].    3. Hypertension  Blood pressure okay but has a cough on lisinopril, switch to losartan hydrochlorothiazide  - HM2(CBC w/o Differential)  - Comprehensive Metabolic Panel  - losartan-hydrochlorothiazide (HYZAAR) 50-12.5 mg per tablet; Take 1 tablet by mouth daily.  Dispense: 90 tablet; Refill: 3    4. Age-related osteoporosis without current pathological fracture  Discussed at length, does not want any pharmacologic treatment, continue with adequate calcium and vitamin D.  Okay to get calcium in diet rather than true supplementation    5. Mixed hyperlipidemia  Continue atorvastatin  - Lipid Cascade  - atorvastatin (LIPITOR) 20 MG tablet; Take 1 tablet (20 mg total) by mouth daily.  Dispense: 90 tablet; Refill: 3    6. Cigarette nicotine dependence with nicotine-induced disorder  7. Lung nodules - annual screen due 6/2019  Lung Cancer Screening pre-scan counseling Visit    The patient fits the risk profile of patients who benefit from this screening:  -The patient is >55 years old and <80 years old  -The patient has >30 pack year history (over 30)  -The patient has smoked within the past 15 years  -The patient has no medical comorbidity severe enough that it would cause mortality prior to mortality due to the lung cancer attempting to be detected.    Discussion with patient regarding the harms associated with LDCT screening include false-negative and false-positive results, incidental findings, overdiagnosis, and radiation exposure were reviewed at length.   The patient understands that pursuing this screening test may result in a biopsy that  was not necessary. It may also produced added stress over a nodule that is likely not cancer.    Of 100 patients who get screening, 25 will have a positive scan. Of those 25, only 1 will have cancer.  Overdiagnosis is estimated at 10% of patients-- they would not have been detected in the patient's lifetime without screening. Less than 1% of patients likely had death related to radiation exposure increase.   Average low-dose CT associated with 0.61 to 1.5 mSv. Annual background radiation exposure in the [place] averages 2.4 mS; mammogram is 0.7mSv.    The benefits are reduction in risk of death from lung cancer. The number needed to treat is 320 (for every 320 patients who undergo screening, 1 patient will have a benefit in mortality from early detection from the screening).    Undergoing this screening implies willingness to pursue further potentially invasive testing to discover potential cancer.    All questions were answered.    The patient was counseled regarding smoking cessation and its risk for lung cancer.    - CT Low Dose Lung Screening Chest; Future  8. Primary osteoarthritis of both hands  Trial of Voltaren gel  - diclofenac sodium (VOLTAREN) 1 % Gel; Apply 2 g topically 4 (four) times a day.  Dispense: 500 g; Refill: 11    9. Skin lesion  Mentioned below she does have a scaly lesion on her foot and one in her chest wall she does not want me to look at  - Ambulatory referral to Dermatology    10. Immunization due  - Pneumococcal polysaccharide vaccine 23-valent 3 yo or older, subq/IM    11. Visit for screening mammogram  - Mammo Screening Bilateral; Future    12. COPD  Discussed importance of smoking cessation    13. Tobacco abuse  As above    14. Gastroesophageal reflux disease without esophagitis  No longer taking famotidine okay to take Tums    The patient's current medical problems were reviewed.    The following health maintenance schedule was reviewed with the patient and provided in printed  form in the after visit summary:   Health Maintenance   Topic Date Due     HEPATITIS C SCREENING  1955     COPD ACTION PLAN  1955     COLORECTAL CANCER SCREENING  01/30/1972     ZOSTER VACCINES (1 of 2) 01/30/2004     DXA SCAN  06/18/2020     MAMMOGRAM  08/26/2020     MEDICARE ANNUAL WELLNESS VISIT  09/03/2020     TD 18+ HE  06/01/2021     PNEUMOCOCCAL IMMUNIZATION 65+ LOW/MEDIUM RISK (2 of 2 - PPSV23) 09/22/2021     FALL RISK ASSESSMENT  09/22/2021     LIPID  09/03/2024     ADVANCE CARE PLANNING  09/03/2024     SPIROMETRY  Completed     INFLUENZA VACCINE RULE BASED  Completed     HEPATITIS B VACCINES  Aged Out        Subjective:   Chief Complaint: Verena Lagunas is an 65 y.o. female here for an Annual Wellness visit.   HPI: This 65-year-old woman comes in for annual wellness visit.  Struggled with vertigo.  She is followed with ENT and vestibular rehab has been recommended but she is unable to get this because of the closing of the [facility] temporarily.  She has a mild cough.  She continues to smoke.  Some COPD/emphysema.  She is on lisinopril.  She is due for lung cancer screening.  She has osteoporosis and has declined pharmacologic treatment, she is trying to get enough calcium and vitamin D but does not like taking calcium pills.  She is complaining of some arthritic pain in her hands    Review of Systems:  Please see above.  The rest of the review of systems are negative for all systems.    Patient Care Team:  Tomasz Beltarn MD as PCP - General (Internal Medicine)  Tomasz Beltran MD as Assigned PCP     Patient Active Problem List   Diagnosis     Allergic Rhinitis     Mixed hyperlipidemia     Hypertension     Mammogram Right Breast Diffuse Calcification     Age-related osteoporosis without current pathological fracture     Vitamin D deficiency     Tobacco abuse     Deaf     COPD     Lung nodules - annual screen due 6/2019     Gastroesophageal reflux disease without esophagitis      Vertigo     Past Medical History:   Diagnosis Date     Allergic rhinitis      Hypertension      Tobacco abuse       Past Surgical History:   Procedure Laterality Date     OVARIAN CYST REMOVAL Right 1970      Family History   Problem Relation Age of Onset     Breast cancer Maternal Aunt 75     Colon cancer Mother 79             Lung cancer Father 50             Other Brother         6 brothers and 1 sister     No Medical Problems Sister       Social History     Socioeconomic History     Marital status:      Spouse name: Not on file     Number of children: Not on file     Years of education: Not on file     Highest education level: Not on file   Occupational History     Not on file   Social Needs     Financial resource strain: Not on file     Food insecurity     Worry: Not on file     Inability: Not on file     Transportation needs     Medical: Not on file     Non-medical: Not on file   Tobacco Use     Smoking status: Current Every Day Smoker     Packs/day: 0.50     Types: Cigarettes     Smokeless tobacco: Never Used   Substance and Sexual Activity     Alcohol use: No     Comment: rare     Drug use: No     Sexual activity: Not on file   Lifestyle     Physical activity     Days per week: Not on file     Minutes per session: Not on file     Stress: Not on file   Relationships     Social connections     Talks on phone: Not on file     Gets together: Not on file     Attends Temple service: Not on file     Active member of club or organization: Not on file     Attends meetings of clubs or organizations: Not on file     Relationship status: Not on file     Intimate partner violence     Fear of current or ex partner: Not on file     Emotionally abused: Not on file     Physically abused: Not on file     Forced sexual activity: Not on file   Other Topics Concern     Not on file   Social History Narrative    [place]  [place].  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  No children.        Current Outpatient Medications   Medication Sig Dispense Refill     aspirin 81 MG EC tablet Take 81 mg by mouth daily.       atorvastatin (LIPITOR) 20 MG tablet Take 1 tablet (20 mg total) by mouth daily. 90 tablet 3     cholecalciferol, vitamin D3, (VITAMIN D3) 1,000 unit capsule Take 1 capsule (1,000 Units total) by mouth daily.  0     fluticasone propionate (FLONASE) 50 mcg/actuation nasal spray Shake liquid and use 1 spray in each nostril daily. 16 g 12     diclofenac sodium (VOLTAREN) 1 % Gel Apply 2 g topically 4 (four) times a day. 500 g 11     losartan-hydrochlorothiazide (HYZAAR) 50-12.5 mg per tablet Take 1 tablet by mouth daily. 90 tablet 3     No current facility-administered medications for this visit.       Objective:   Vital Signs:   Visit Vitals  /62 (Patient Site: Right Arm, Patient Position: Sitting, Cuff Size: Adult Regular)   Pulse 99   Ht 5' 5\" (1.651 m)   Wt 116 lb (52.6 kg)   SpO2 99%   BMI 19.30 kg/m           VisionScreening:  No exam data present     PHYSICAL EXAM  EYES: Eyelids, conjunctiva, and sclera were normal. Pupils were normal. Cornea, iris, and lens were normal bilaterally.  HEAD, EARS, NOSE, MOUTH, AND THROAT: Head and face were normal. Hearing was normal to voice and the ears were normal to external exam. Nose appearance was normal and there was no discharge. Oropharynx was normal.  NECK: Neck appearance was normal. There were no neck masses and the thyroid was not enlarged.  RESPIRATORY: Breathing pattern was normal and the chest moved symmetrically.  Percussion/auscultatory percussion was normal.  Lung sounds were normal and there were no abnormal sounds.  CARDIOVASCULAR: Heart rate and rhythm were normal.  S1 and S2 were normal and there were no extra sounds or murmurs. Peripheral pulses in arms and legs were normal.  Jugular venous pressure was " normal.  There was no peripheral edema.  GASTROINTESTINAL: The abdomen was normal in contour.  Bowel sounds were present.  Percussion detected no organ enlargement or tenderness.  Palpation detected no tenderness, mass, or enlarged organs.   MUSCULOSKELETAL: Skeletal configuration was normal and muscle mass was normal for age. Joint appearance was overall normal.  LYMPHATIC: There were no enlarged nodes.  SKIN/HAIR/NAILS: Skin color was normal.  There were no skin lesions.  Hair and nails were normal.  NEUROLOGIC: The patient was alert and oriented to person, place, time, and circumstance. Speech was normal. Cranial nerves were normal with exception that she is deaf and hard of hearing. Motor strength was normal for age. The patient was normally coordinated.  PSYCHIATRIC:  Mood and affect were normal and the patient had normal recent and remote memory. The patient's judgment and insight were normal.    Assessment Results 9/22/2020   Activities of Daily Living No help needed   Instrumental Activities of Daily Living No help needed   Get Up and Go Score Less than 12 seconds   Mini Cog Total Score 5   Some recent data might be hidden     A Mini-Cog score of 0-2 suggests the possibility of dementia, score of 3-5 suggests no dementia      Identified Health Risks:     The patient was provided with suggestions to help her develop a healthy physical lifestyle.   The patient was provided with suggestions to help her develop a healthy emotional lifestyle.   The patient's PHQ-9 score is consistent with mild depression. She was provided with information regarding depression and was advised to schedule a follow up appointment in 4 weeks to further address this issue.  Information regarding advance directives (living schafer), including where she can download the appropriate form, was provided to the patient via the AVS.

## 2021-06-12 NOTE — TELEPHONE ENCOUNTER
Tried calling to check in with patient and VM not set up. Will try again.     Shantel Tadeo, CMA

## 2021-06-12 NOTE — TELEPHONE ENCOUNTER
Who is calling:  Employer  Reason for Call:  Received the work ability forms stating the patient is unable to work for 8 weeks, need the start date for this please, as not on the papers received.  Date of last appointment with primary care: 9/22/20  Okay to leave a detailed message: Yes

## 2021-06-13 NOTE — PROGRESS NOTES
"  Office Visit - Follow Up   Verena Lagunas   65 y.o. female    Date of Visit: 11/25/2020    Chief Complaint   Patient presents with     Hypertension        Assessment and Plan   1. Vertigo  The recommendation from ENT as she do vestibular rehab.  She has not been able to do this because of the current global pandemic as well as the destruction of the [facility].  [facility] will be opening in December.  She feels that she is unable to perform her job functions at work and therefore paperwork was filled out to this extent.    2. Tobacco abuse  Smoking cessation advised    3. Hypertension  Blood pressure control continue same    4. Screen for colon cancer  - Cologuard      Return in about 2 months (around 1/25/2021) for recheck.     History of Present Illness   This 65 y.o. old woman comes in for follow-up.  She has been struggling with vertigo for many months.  She is currently on disability at work for this.  I have recommended ENT and she has seen ENT.  They recommended some vestibular rehab but she has not been able to complete this because of the current global coronavirus pandemic along with social unrest and the distraction of the [facility].  At this point she feels like she is not at a state that she can return to work and perform her required functions.    Review of Systems: A comprehensive review of systems was negative except as noted.     Medications, Allergies and Problem List   Reviewed, reconciled and updated  Post Discharge Medication Reconciliation Status:      Physical Exam   General Appearance:   No acute distress    /62 (Patient Site: Left Arm, Patient Position: Sitting, Cuff Size: Adult Regular)   Pulse 64   Ht 5' 5\" (1.651 m)   Wt 120 lb (54.4 kg)   SpO2 98%   BMI 19.97 kg/m      HEENT exam is unremarkable  Neck supple no thyromegaly or nodule palpable  Lymphatic no cervical lymphadenopathy  Cardiovascular regular rate and rhythm no murmur gallop or rub  Pulmonary lungs are clear " to auscultation bilaterally  Gastrointestinal abdomen soft nontender nondistended no organomegaly  Neurologic exam is non focal, she is deaf  Psychiatric pleasant, no confusion or agitation        Additional Information   Current Outpatient Medications   Medication Sig Dispense Refill     aspirin 81 MG EC tablet Take 81 mg by mouth daily.       atorvastatin (LIPITOR) 20 MG tablet Take 1 tablet (20 mg total) by mouth daily. 90 tablet 3     cholecalciferol, vitamin D3, (VITAMIN D3) 1,000 unit capsule Take 1 capsule (1,000 Units total) by mouth daily.  0     diclofenac sodium (VOLTAREN) 1 % Gel Apply 2 g topically 4 (four) times a day. 500 g 11     fluticasone propionate (FLONASE) 50 mcg/actuation nasal spray Shake liquid and use 1 spray in each nostril daily. 16 g 12     losartan-hydrochlorothiazide (HYZAAR) 50-12.5 mg per tablet Take 1 tablet by mouth daily. 90 tablet 3     No current facility-administered medications for this visit.      Allergies   Allergen Reactions     Cat Hair Std Allergenic Ext      Penicillins Shortness Of Breath     Social History     Tobacco Use     Smoking status: Current Every Day Smoker     Packs/day: 0.50     Types: Cigarettes     Smokeless tobacco: Never Used   Substance Use Topics     Alcohol use: No     Comment: rare     Drug use: No       Review and/or order of clinical lab tests:  Review and/or order of radiology tests:  Review and/or order of medicine tests:  Discussion of test results with performing physician:  Decision to obtain old records and/or obtain history from someone other than the patient:  Review and summarization of old records and/or obtaining history from someone other than the patient and.or discussion of case with another health care provider:  Independent visualization of image, tracing or specimen itself:    Time:      Tomasz Beltran MD

## 2021-06-13 NOTE — TELEPHONE ENCOUNTER
Reason for Call:  Form, our goal is to have forms completed with 72 hours, however, some forms may require a visit or additional information.  Pt  Brought in FMLA paperwork to her appt    Type of letter, form or note:  FMLA    Who is the form from?: Patient    Where did the form come from: Patient or family brought in       What clinic location was the [facility] at[facility]    Where the form was placed: Given to MA/RN    What number is listed as a contact on the form?: fax number [phone]          Additional comments: pt states she got a phone call that the FMLA form  was faxed to [employer] but they states it was not faxed to them.  Fax number [phone]    Call taken on 12/17/2020 at 8:13 AM by Pamela Behr

## 2021-06-13 NOTE — TELEPHONE ENCOUNTER
Unable to leave patient a message.  I did refax the [facility] paperwork to [facility] at 1 [phone]

## 2021-06-13 NOTE — TELEPHONE ENCOUNTER
LMTCB and confirm if kit was received and plans on collecting specimens and sending in, or is no longer interested in doing the test.

## 2021-06-14 NOTE — TELEPHONE ENCOUNTER
Left pt detailed message that pcp filled out her FMLA forms stating she is out of work from 10/13/20 until TBD.  Pt has upcoming appt with pcp on 2/5/21, zeke stats will be re evaluated then.  Advised pt to call back with further questions.

## 2021-06-14 NOTE — TELEPHONE ENCOUNTER
Reason for Call:  Meek garcesm HR calling from [employer] and is inquiring about the leave of absence from pt work.  Needing the duration of length that pt will be out of work.  TBA will not work.  When can she return to work?  They had thought January but pt is saying longer?    Detailed comments: please call with more info on when she can return to work.    Phone Number Patient can be reached at: Home number on file [phone] (home)    Best Time: any she will be working until 430pm today    Can we leave a detailed message on this number?: Yes    Call taken on 12/29/2020 at 8:52 AM by Pamela Behr

## 2021-06-14 NOTE — TELEPHONE ENCOUNTER
Spoke with Meek and explained that pcp was out of the clinic until January 11, 2021 and that pt would be seeing pcp for appt on 1/14/21 and FMLA status would be re-evaluated then.  Meek understanding and requested that after appt clarification of pt's work status be faxed to:  [phone].  Appt note updated reflecting this.

## 2021-06-14 NOTE — TELEPHONE ENCOUNTER
To be determined.  Dr. Beltran is unclear of when patient can return to work based upon his FMLA forms.

## 2021-06-14 NOTE — PROGRESS NOTES
Vestibular Initial Evaluation    Patient Name: Verena Lagunas  Date of evaluation: 1/4/2021  Referral Diagnosis: vertigo  Referring provider: Tomasz Beltran MD  Visit Diagnosis:     ICD-10-CM    1. Dizziness  R42    2. Unsteadiness on feet  R26.81    3. Decreased activities of daily living (ADL)  Z78.9        Assessment:      Symptoms are consistent with weakened vestibular function. She was re-instructed on adaptation and substitution exercises today. Patient is concerned because her RTW date is 1-8-21 and she doesn't feel that she is able to work safely because of her balance and vertigo. She states that she feels she would like time to get rehab started and see if her symptoms improve as they did last time she had vestibular rehab. She does not see her PCP until 1- and she has leave of absence paperwork that she needs filled out. I explained that as an OT, I'm not able to fill out the paperwork to extend her leave of absence and that I will fax it to her PCP today. Patient understands and is in agreement with this and she will contact her PCP later today to see how she can get the completed paperwork back.    Goals:  Patient Will Demonstrate / Verbalize independence in self-management of condition in: 2 weeks  Patient will be independent with home exercise program in: 4 weeks  Patient will show improved balance for safer: for household ambulation;in 12 weeks  Patient will be able to walk: with head motion;without loss of balance;in 12 weeks  Patient will bend: to dress;to clean;without dizziness;without loss of balance;in 12 weeks  Patient will turn head: without dizziness;for conversation;in 12 weeks  Patient will look up / down: without dizziness;for drinking;in 12 weeks      Patient's expectations/goals are realistic.    Barriers to Learning or Achieving Goals:  No Barriers.       Plan / Patient Instructions:        Plan of Care:   Authorization / Certification Start Date:  21  Authorization / Certification End Date: 21  Authorization / Certification Number of Visits: 12  Communication with: Referral Source  Patient Related Instruction: Nature of Condition;Treatment plan and rationale;Basis of treatment;Expected outcome  Times per Week: 1  Number of Weeks: 12  Number of Visits: 12  Select Plan of Care: Select  Neuromuscular Reeducation: vestibular  Functional Training (ADL's): compensatory training  Canolith Repostioning: .      POC and pathology of condition were reviewed with patient.  Pt. is in agreement with the Plan of Care. Treatment techniques, plan of care, and goals were discussed with the patient.  The patient agrees to the plan as outlined.  The plan of care is dynamic and will be modified on an ongoing basis.    A Home Exercise Program (HEP) was initiated today. Pt. was instructed in exercises by OT and patient was given a handout with detailed instructions.        Subjective:         History of Present Illness:    Verena is a 65 y.o. female who presents to therapy today with complaints of dizziness and imbalance. Patient had initial onset of symptoms in the late 's with flare up in 2019. Patient was seen in OT for vestibular exercises and symptoms significantly improved. She reports that symptoms recurred in 2020 and she had to take FMLA at work because she couldn't walk without loss of balance. Patient did schedule a vestibular rehab evaluation with me and it was scheduled incorrectly. She did reschedule for today but this caused a delay in getting started on vestibular rehab. Symptoms are not improving. Patient denies ear pain. Patient is deaf. Functional limitations are described as occurring with balance, bending, bed mobility, head turns, looking up or down, stepping over curbs.     Pain Ratin       Objective:      Note: Items left blank indicates the item was not performed or not indicated at the time of the evaluation.    Patient  Outcome Measures:   No data recorded     Vestibular Disorder Examination  1. Dizziness     2. Unsteadiness on feet     3. Decreased activities of daily living (ADL)         Precautions/Restrictions: None    Posture Observation: General standing posture is normal.    ROM:  Not Tested    Strength: Not Tested    Sensation: Patient reports normal sensation    Functional Mobility: fair      Modified CTSIB:  Normal Surface Eyes Open-Normal  Normal Surface Eyes Closed-Mild sway  Perturbed Surface Eyes Open-Mild sway  Perturbed Surface Eyes Closed-Moderate sway    Oculomotor Assessment: NT as patient declines video goggles because she is deaf and very uncomfortable with vision occluded.  Spontaneous Nystagmus with fixation:   Spontaneous Nystagmus without fixation:  Gaze-evoked nystagmus:  Smooth pursuit:  Saccades:  VOR to slow movement:   Rapid Head Shake Test:  VOR Head Thrust:  Static Visual Acuity:  Dynamic Visual Acuity:    Positional Tests:  Hallpike Right:  NT  Hallpike Left:  NT  Head Roll Right: NT  Head Roll Left:  NT    Plan for next visit: see if symptoms are improving. Consider VOR suppression.    Treatment Today: Patient instructed on adaptation and substitution exercises today. She has done these in the past with success and would benefit from resuming these. Patient would benefit from continuing these on a maintenance level when she feels well.  X1 viewing-5 seconds-instructed  Gait with head motion-instructed  Normal surface eyes closed-instructed  TREATMENT MINUTES COMMENTS   Evaluation 20    Self-care/ Home management     Neuromuscular Re-education 25    Canalith repositioning procedure           Total 55    Blank areas are intentional and mean the treatment did not include these items.     GOALS AND PLAN OF CARE WERE ESTABLISHED IN COOPERATION WITH THE PATIENT    OT Evaluation Code: (Please list factors)   Comorbidities:   Patient Active Problem List   Diagnosis     Allergic Rhinitis     Mixed  hyperlipidemia     Hypertension     Mammogram Right Breast Diffuse Calcification     Age-related osteoporosis without current pathological fracture     Vitamin D deficiency     Tobacco abuse     Deaf     COPD     Lung nodules - annual screen due 6/2019     Gastroesophageal reflux disease without esophagitis     Vertigo     Profile/History Review: Brief    Need for eval modification: No     # Treatment options: Limited   Clinical Decision Making:  Low  Occupational Profile/ Medical and Therapy History and Comorbidities Occupational Performance Clinical Decision Making   (Complexity)   brief history with review of medical/therapy records related to the presenting problem.  No comorbidities 1-3 Performance deficits that result in activity limitations and/or participation restrictions.    No Assessment Modification  Low complexity, which includes  problem-focused assessments, and consideration of a limited number of treatment options.      expanded review of medical/therapy records and additional review of physical, cognitive and psychosocial history.    May have comorbidities 3-5 Performance deficits that result in activity limitations and/or participation restrictions.    Minimal to moderate modification of assessment Moderate complexity, which includes analysis of data from detailed assessments, and consideration of several treatment options.         Review of medical/therapy records and extensive additional review of physical, cognitive and psychosocial history.  Comorbidities affect occupational performance 5 or more Performance deficits that result in activity limitations and/or participation restrictions.    Significant modification of assessment High complexity, analysis of  Occupational profile and data,  Comprehensive assessments, multiple treatment options.            Mavis Mixon  1/4/2021  8:08-9:03 AM

## 2021-06-14 NOTE — TELEPHONE ENCOUNTER
Reason for Call:  Other call back      Detailed comments: Relayed to pt that form was faxed yesterday. She wants to know what was written on the form, can she go back to work or not? Form not scanned in media yet so unsure what to relay to pt. Pt requesting call back from care team ASAP to clarify.    Phone Number Patient can be reached at: Home number on file [phone] (home)    Best Time: anytime    Can we leave a detailed message on this number?: Yes    Call taken on 1/15/2021 at 11:19 AM by Willis Benton

## 2021-06-14 NOTE — TELEPHONE ENCOUNTER
Dr. Beltran is out of clinic until 1/11/21. Pt has follow up appt with Dr. Beltran on 1/14/20.  Will route to covering doc to advise if okay for pt to be out of work until her follow up with Dr. Beltran where he can advise further.

## 2021-06-14 NOTE — TELEPHONE ENCOUNTER
Reason for Call:  Form, our goal is to have forms completed with 72 hours, however, some forms may require a visit or additional information.    Type of letter, form or note:  Certification of Health form    Who is the form from?: Patients Employer but patient is faxing it to [facility] fax # [phone]    Where did the form come from: The patient    What clinic location was the form placed at?: Faxed to [phone] ([facility] main fax)    Where the form was placed: Unsure    What number is listed as a contact on the form?: unsure       Additional comments: Patient will fax form along with details.  She was scheduled for today with Dr. Beltran but because of the weather she cancelled and rescheduled with Dr Beltran soonest opening (Early February).  She is afraid she will fall walking to her appointment either on her walk to the train station or to the clinic.     Ok to call patient or send a message on my chart.     Call taken on 1/14/2021 at 8:52 AM by Greer Baird

## 2021-06-14 NOTE — PROGRESS NOTES
Rehabilitation Daily Progress     Patient Name: Verena Lagunas  Date: 2021  Visit #: 2  Referral Diagnosis: chronic vertigo  Referring provider: Tomasz Beltran MD  Visit Diagnosis:     ICD-10-CM    1. Dizziness  R42    2. Unsteadiness on feet  R26.81    3. Decreased activities of daily living (ADL)  Z78.9        Assessment:     Patient is appropriate to continue with skilled occupational therapy intervention, as indicated by initial plan of care. Patient saw ENT last week. Patient declining VNG at this time because she does not want to be in the dark. Patient would like to continue vestibular rehab. Patient returned to work yesterday and is working from home. She will be seeing her PCP in early February to follow up.   Patient feels like she has improved since last visit. She has no dizziness remaining but reports being off balance.    Goal Status:  Patient Will Demonstrate / Verbalize independence in self-management of condition in: 2 weeks  Patient will be independent with home exercise program in: 4 weeks  Patient will show improved balance for safer: for household ambulation;in 12 weeks  Patient will be able to walk: with head motion;without loss of balance;in 12 weeks  Patient will bend: to dress;to clean;without dizziness;without loss of balance;in 12 weeks  Patient will turn head: without dizziness;for conversation;in 12 weeks  Patient will look up / down: without dizziness;for drinking;in 12 weeks      Plan / Patient Education:     Consider VOR suppression, tandem stance and single leg stance    Subjective:     Pain Ratin    Objective:     Positional Tests:  Hallpike Right:  NT  Hallpike Left:  NT    Treatment Today: Reviewed existing exercises and modified patient's technique. Instructed on additional exercises today as well.  X1 viewing-5 seconds-continue  Gait with head motion-continue  Normal surface eyes closed-discontinue  Perturbed surface eyes closed-instructed  Crossovers eyes  open-instructed  TREATMENT MINUTES COMMENTS   Evaluation     Self-care/ Home management     Neuromuscular Re-education 40    Canalith repositioning procedure           Total 40    Blank areas are intentional and mean the treatment did not include these items.          Mavis Mixon  1/20/2021  10:03 AM

## 2021-06-15 PROBLEM — J41.0 SIMPLE CHRONIC BRONCHITIS (H): Status: ACTIVE | Noted: 2017-05-31

## 2021-06-15 NOTE — PROGRESS NOTES
"  Office Visit - Follow Up   Verena Lagunas   66 y.o. female    Date of Visit: 2/5/2021    Chief Complaint   Patient presents with     Hypertension     Paperwork        Assessment and Plan   1. Vertigo  Continue management per ENT and occupational therapy, paperwork filled out for FMLA    2. Simple chronic bronchitis (H)  Smoking cessation advised.  I would recommend Covid vaccine when available.  She would be considered high risk    3. Cigarette nicotine dependence with nicotine-induced disorder  As above    4. Deafness, unspecified laterality  Use of Get10  today    Return in about 2 months (around 4/5/2021) for recheck.     History of Present Illness   This 66 y.o. old woman comes in for follow-up.  We do not have a  present.  Overall doing okay.  We reviewed her Occupational Therapy notes.  She thinks this is helpful.  She is back to work 4 days a week and 8-hour shifts.  She has had to miss a couple of days.  She is hopeful to return to 5 days a week and 40 hours at the end of March.  She will be able to work from home which will be helpful.  She continues to smoke.  Breathing generally stable.  She would like a Covid vaccine.    Review of Systems: A comprehensive review of systems was negative except as noted.     Medications, Allergies and Problem List   Reviewed, reconciled and updated  Post Discharge Medication Reconciliation Status:      Physical Exam   General Appearance:   No acute distress    /74 (Patient Site: Left Arm, Patient Position: Sitting, Cuff Size: Adult Regular)   Pulse 83   Ht 5' 5\" (1.651 m)   Wt 126 lb (57.2 kg)   SpO2 97%   BMI 20.97 kg/m      Breathing unlabored neurologic exam unremarkable, she is deaf     Additional Information   Current Outpatient Medications   Medication Sig Dispense Refill     aspirin 81 MG EC tablet Take 81 mg by mouth daily.       atorvastatin (LIPITOR) 20 MG tablet Take 1 tablet (20 mg total) by mouth daily. 90 " tablet 3     cholecalciferol, vitamin D3, (VITAMIN D3) 1,000 unit capsule Take 1 capsule (1,000 Units total) by mouth daily.  0     diclofenac sodium (VOLTAREN) 1 % Gel Apply 2 g topically 4 (four) times a day. 500 g 11     fluticasone propionate (FLONASE) 50 mcg/actuation nasal spray Shake liquid and use 1 spray in each nostril daily. 16 g 12     losartan-hydrochlorothiazide (HYZAAR) 50-12.5 mg per tablet Take 1 tablet by mouth daily. 90 tablet 3     No current facility-administered medications for this visit.      Allergies   Allergen Reactions     Cat Hair Std Allergenic Ext      Penicillins Shortness Of Breath     Social History     Tobacco Use     Smoking status: Current Every Day Smoker     Packs/day: 0.50     Types: Cigarettes     Smokeless tobacco: Never Used   Substance Use Topics     Alcohol use: No     Comment: rare     Drug use: No       Review and/or order of clinical lab tests:  Review and/or order of radiology tests:  Review and/or order of medicine tests:  Discussion of test results with performing physician:  Decision to obtain old records and/or obtain history from someone other than the patient:  Review and summarization of old records and/or obtaining history from someone other than the patient and.or discussion of case with another health care provider:  Independent visualization of image, tracing or specimen itself:    Time:      Tomasz Beltran MD

## 2021-06-15 NOTE — PROGRESS NOTES
Discharge Summary  Patient Name: Verena Lagunas  Date: 3/24/2021  Referral Diagnosis: chronic vertigo  Referring provider: Tomasz Beltran MD  Visit Diagnosis:   1. Dizziness     2. Unsteadiness on feet     3. Decreased activities of daily living (ADL)         Goal status: progressing toward  Patient Will Demonstrate / Verbalize independence in self-management of condition in: 2 weeks  Patient will be independent with home exercise program in: 4 weeks  Patient will show improved balance for safer: for household ambulation;in 12 weeks  Patient will be able to walk: with head motion;without loss of balance;in 12 weeks  Patient will bend: to dress;to clean;without dizziness;without loss of balance;in 12 weeks  Patient will turn head: without dizziness;for conversation;in 12 weeks  Patient will look up / down: without dizziness;for drinking;in 12 weeks      Patient was seen for 3 visits between 1-4-21 and 2-17-21.    Therapy will be discontinued at this time.  The patient will need a new referral to resume.    Thank you for your referral.  Mavis SEVILLA Mixon  3/24/2021   8:40 PM    Rehabilitation Daily Progress     Patient Name: Verena Lagunas  Date: 2/17/2021  Visit #: 3  Referral Diagnosis: chronic vertigo  Referring provider: Tomasz Beltran MD  Visit Diagnosis:     ICD-10-CM    1. Dizziness  R42    2. Unsteadiness on feet  R26.81    3. Decreased activities of daily living (ADL)  Z78.9        Assessment:     Patient is appropriate to continue with skilled occupational therapy intervention, as indicated by initial plan of care. Patient is working 32 hours a week now and is going in to the office on Tuesdays. She will be seeing her PCP on March 22nd to follow up. She reports that her symptoms are steadily improving. She states that the dizziness in no longer every day and only flares up when I'm in a rush or moving a lot.    Goal Status:  Patient Will Demonstrate / Verbalize independence in  self-management of condition in: 2 weeks  Patient will be independent with home exercise program in: 4 weeks  Patient will show improved balance for safer: for household ambulation;in 12 weeks  Patient will be able to walk: with head motion;without loss of balance;in 12 weeks  Patient will bend: to dress;to clean;without dizziness;without loss of balance;in 12 weeks  Patient will turn head: without dizziness;for conversation;in 12 weeks  Patient will look up / down: without dizziness;for drinking;in 12 weeks      Plan / Patient Education:     Consider VOR suppression, tandem stance and single leg stance    Subjective:     Pain Ratin    Objective:     Positional Tests:  Hallpike Right:  NT  Hallpike Left:  NT    Treatment Today: Patient is performing exercises 3 times a day. Reviewed existing exercises and modified patient's technique. Instructed on additional exercises today as well.  X1 viewing-5 seconds-continue  Gait with head motion-continue  Perturbed surface eyes closed-continue  Crossovers eyes open-continue  Braiding-instructed  Targets- 3 seconds, sitting down-instructed     Normal surface eyes closed-discontinue  TREATMENT MINUTES COMMENTS   Evaluation     Self-care/ Home management     Neuromuscular Re-education 54    Canalith repositioning procedure           Total 54    Blank areas are intentional and mean the treatment did not include these items.          Mavis Mixon  2021  9:04 AM

## 2021-06-16 ENCOUNTER — COMMUNICATION - HEALTHEAST (OUTPATIENT)
Dept: INTERNAL MEDICINE | Facility: CLINIC | Age: 66
End: 2021-06-16

## 2021-06-16 DIAGNOSIS — J30.9 ALLERGIC RHINITIS, UNSPECIFIED SEASONALITY, UNSPECIFIED TRIGGER: ICD-10-CM

## 2021-06-16 PROBLEM — R42 VERTIGO: Status: ACTIVE | Noted: 2020-09-11

## 2021-06-16 PROBLEM — K21.9 GASTROESOPHAGEAL REFLUX DISEASE WITHOUT ESOPHAGITIS: Status: ACTIVE | Noted: 2020-06-24

## 2021-06-16 PROBLEM — F17.219 CIGARETTE NICOTINE DEPENDENCE WITH NICOTINE-INDUCED DISORDER: Status: ACTIVE | Noted: 2021-02-05

## 2021-06-16 NOTE — PROGRESS NOTES
"  Office Visit - Follow Up   Verena Lagunas   66 y.o. female    Date of Visit: 3/22/2021    Chief Complaint   Patient presents with     Follow-up        Assessment and Plan   1. Hypertension  Blood pressure control continue same    2. Screen for colon cancer  - Cologuard    3. Cigarette nicotine dependence with nicotine-induced disorder  Offered FDA approved smoking cessation modalities, patient declined at this time    4. COPD  Looking cessation as above, continue inhalers and action plan    5. Lung nodules - annual screen due 6/2019  She will call and schedule her appointment, phone number given to [facility]    6. Vertigo  Doing better note given for return to work    Return in about 6 months (around 9/22/2021) for annual physical.     History of Present Illness   This 66 y.o. old comes in for follow-up.  Used to have been reconciling with .  Overall doing well.  Ready to return to work full-time.  She has had excellent response to vestibular rehab.  She continues to smoke and is not interested in quitting at this time.  She is interested in lung cancer screening.  She also needs a mammogram.  She has Cologuard at home but has not yet done the test.  Breathing is generally stable.    Review of Systems: A comprehensive review of systems was negative except as noted.     Medications, Allergies and Problem List   Reviewed, reconciled and updated  Post Discharge Medication Reconciliation Status:      Physical Exam   General Appearance:   No acute distress    /72 (Patient Site: Left Arm, Patient Position: Sitting, Cuff Size: Adult Regular)   Pulse 94   Resp 18   Ht 5' 5\" (1.651 m)   Wt 126 lb 9 oz (57.4 kg)   SpO2 96%   BMI 21.06 kg/m           Additional Information   Current Outpatient Medications   Medication Sig Dispense Refill     aspirin 81 MG EC tablet Take 81 mg by mouth daily.       atorvastatin (LIPITOR) 20 MG tablet Take 1 tablet (20 mg total) by mouth daily. 90 tablet 3     " cholecalciferol, vitamin D3, (VITAMIN D3) 1,000 unit capsule Take 1 capsule (1,000 Units total) by mouth daily.  0     diclofenac sodium (VOLTAREN) 1 % Gel Apply 2 g topically 4 (four) times a day. 500 g 11     fluticasone propionate (FLONASE) 50 mcg/actuation nasal spray Shake liquid and use 1 spray in each nostril daily. 16 g 12     losartan-hydrochlorothiazide (HYZAAR) 50-12.5 mg per tablet Take 1 tablet by mouth daily. 90 tablet 3     No current facility-administered medications for this visit.      Allergies   Allergen Reactions     Cat Hair Std Allergenic Ext      Penicillins Shortness Of Breath     Social History     Tobacco Use     Smoking status: Current Every Day Smoker     Packs/day: 0.50     Types: Cigarettes     Smokeless tobacco: Never Used   Substance Use Topics     Alcohol use: No     Comment: rare     Drug use: No       Review and/or order of clinical lab tests:  Review and/or order of radiology tests:  Review and/or order of medicine tests:  Discussion of test results with performing physician:  Decision to obtain old records and/or obtain history from someone other than the patient:  Review and summarization of old records and/or obtaining history from someone other than the patient and.or discussion of case with another health care provider:  Independent visualization of image, tracing or specimen itself:    Time:      Tomasz Beltran MD

## 2021-06-17 NOTE — PATIENT INSTRUCTIONS - HE
Patient Instructions by Tomasz Beltran MD at 9/3/2019  8:20 AM     Author: Tomasz Beltran MD Service: -- Author Type: Physician    Filed: 9/3/2019  3:26 PM Encounter Date: 9/3/2019 Status: Signed    : Tomasz Beltran MD (Physician)         Patient Education     Your Health Risk Assessment indicates you feel you are not in good physical health.    A healthy lifestyle helps keep the body fit and the mind alert. It helps protect you from disease, helps you fight disease, and helps prevent chronic disease (disease that doesn't go away) from getting worse. This is important as you get older and begin to notice twinges in muscles and joints and a decline in the strength and stamina you once took for granted. A healthy lifestyle includes good healthcare, good nutrition, weight control, recreation, and regular exercise. Avoid harmful substances and do what you can to keep safe. Another part of a healthy lifestyle is stay mentally active and socially involved.    Good healthcare     Have a wellness visit every year.     If you have new symptoms, let us know right away. Don't wait until the next checkup.     Take medicines exactly as prescribed and keep your medicines in a safe place. Tell us if your medicine causes problems.   Healthy diet and weight control     Eat 3 or 4 small, nutritious, low-fat, high-fiber meals a day. Include a variety of fruits, vegetables, and whole-grain foods.     Make sure you get enough calcium in your diet. Calcium, vitamin D, and exercise help prevent osteoporosis (bone thinning).     If you live alone, try eating with others when you can. That way you get a good meal and have company while you eat it.     Try to keep a healthy weight. If you eat more calories than your body uses for energy, it will be stored as fat and you will gain weight.     Recreation   Recreation is not limited to sports and team events. It includes any activity that provides relaxation,  interest, enjoyment, and exercise. Recreation provides an outlet for physical, mental, and social energy. It can give a sense of worth and achievement. It can help you stay healthy.       Patient Education   Signs of Hearing Loss  Hearing loss is a problem shared by many people. In fact, it is one of the most common health conditions, particularly as people age. Most people over age 65 have some hearing loss, and by age 80, almost everyone does. Because hearing loss usually occurs slowly over the years, you may not realize your hearing ability has gotten worse.       Have your hearing checked  Contact your [facility] care provider if you:    Have to strain to hear normal conversation.    Have to watch other peoples faces very carefully to follow what theyre saying.    Need to ask people to repeat what theyve said.    Often misunderstand what people are saying.    Turn the volume of the television or radio up so high that others complain.    Feel that people are mumbling when theyre talking to you.    Find that the effort to hear leaves you feeling tired and irritated.    Notice, when using the phone, that you hear better with 1 ear than the other.    0880-3705 [employer]. [address] [place] [address]308. All rights reserved. This information is not intended as a substitute for professional medical care. Always follow your healthcare professional's instructions.         Patient Education   Preventing Falls in the Home  As you get older, falls are more likely. Thats because your reaction time slows. Your muscles and joints may also get stiffer, making them less flexible. Illness, medications, and vision changes can also affect your balance. A fall could leave you unable to live on your own. To make your home safer, follow these tips:    Floors    Put nonskid pads under area rugs.    Remove throw rugs.    Replace worn floor coverings.    Tack carpets firmly to each step on carpeted stairs. Put  nonskid strips on the edges of uncarpeted stairs.    Keep floors and stairs free of clutter and cords.    Arrange furniture so there are clear pathways.    Clean up any spills right away.    Bathrooms    Install grab bars in the tub or shower.    Apply nonskid strips or put a nonskid rubber mat in the tub or shower.    Sit on a bath chair to bathe.    Use bathmats with nonskid backing.    Lighting    Keep a flashlight in each room.    Put a nightlight along the pathway between the bedroom and the bathroom.    5881-6882 [employer]. [address] [place] [address]20. All rights reserved. This information is not intended as a substitute for professional medical care. Always follow your healthcare professional's instructions.         Patient Education   Understanding Advance Care Planning  Advance care planning is the process of deciding ones own future medical care. It helps ensure that if you cant speak for yourself, your wishes can still be carried out. The plan is a series of legal documents that note a persons wishes. The documents vary by state. Advance care planning may be done when a person has a serious illness that is expected to get worse. It may be done before major surgery. And it can help you and your family be prepared in case of a major illness or injury. Advance care planning helps with making decisions at these times.       A health care proxy is a person who acts as the voice of a patient when the patient cant speak for himself or herself. The name of this role varies by state. It may be called a Durable Medical Power of  or Durable Power of  for Healthcare. It may be called an agent, surrogate, or advocate. Or it may be called a representative or decision maker. It is an official duty that is identified by a legal document. The document also varies by state.    Why Is Advance Care Planning Important?  If a person communicates their healthcare wishes:    They  will be given medical care that matches their values and goals.    Their family members will not be forced to make decisions in a crisis with no guidance.  Creating a Plan  Making an advance care plan is often done in 3 steps:    Thinking about ones wishes. To create an advance care plan, you should think about what kind of medical treatment you would want if you lose the ability to communicate. Are there any situations in which you would refuse or stop treatment? Are there therapies you would want or not want? And whom do you want to make decisions for you? There are many places to learn more about how to plan for your care. Ask your doctor or  for resources.    Picking a health care proxy. This means choosing a trusted person to speak for you only when you cant speak for yourself. When you cannot make medical decisions, your proxy makes sure the instructions in your advance care plan are followed. A proxy does not make decisions based on his or her own opinions. They must put aside those opinions and values if needed, and carry out your wishes.    Filling out the legal documents. There are several kinds of legal documents for advance care planning. Each one tells health care providers your wishes. The documents may vary by state. They must be signed and may need to be witnessed or notarized. You can cancel or change them whenever you wish. Depending on your state, the documents may include a Healthcare Proxy form, Living Will, Durable Medical Power of , Advance Directive, or others.  The Familys Role  The best help a family can give is to support their loved ones wishes. Open and honest communication is vital. Family should express any concerns they have about the patients choices while the patient can still make decisions.    0526-8236 [employer]. [address] [place] [address] 10098. All rights reserved. This information is not intended as a substitute for professional  medical care. Always follow your healthcare professional's instructions.         Also, [facility] offers a free, downloadable health care directive that allows you to share your treatment choices and personal preferences if you cannot communicate your wishes. It also allows you to appoint another person (called a health care agent) to make health care decisions if you are unable to do so. You can download an advance directive by going here: [email or web address]     Patient Education   Personalized Prevention Plan  You are due for the preventive services outlined below.  Your care team is available to assist you in scheduling these services.  If you have already completed any of these items, please share that information with your care team to update in your medical record.  Health Maintenance   Topic Date Due   ? HEPATITIS C SCREENING  1955   ? HIV SCREENING  01/30/1969   ? COLONOSCOPY  01/30/2004   ? ZOSTER VACCINES (1 of 2) 01/30/2004   ? PNEUMOCOCCAL POLYSACCHARIDE VACCINE AGE 65 AND OVER  01/30/2019   ? PNEUMOCOCCAL CONJUGATE VACCINE FOR ADULTS (PCV13 OR PREVNAR)  01/30/2019   ? MEDICARE ANNUAL WELLNESS VISIT  05/29/2019   ? INFLUENZA VACCINE RULE BASED (1) 08/01/2019   ? FALL RISK ASSESSMENT  03/26/2020   ? DXA SCAN  06/18/2020   ? MAMMOGRAM  08/26/2020   ? ADVANCE CARE PLANNING  05/18/2021   ? TD 18+ HE  06/01/2021   ? TDAP ADULT ONE TIME DOSE  Completed

## 2021-06-18 NOTE — PROGRESS NOTES
Office Visit - Physical   Verena Lagunas   63 y.o.  female    Date of visit: 5/29/2018  Physician: Tomasz Beltran MD     Assessment and Plan   1. Routine general medical examination at a health care facility  Is a generally healthy 63-year-old woman.  Screening tests as below.  Smoking cessation as below.    2. Visit for screening mammogram  - Mammo Screening Bilateral; Future    3. Screen for colon cancer  - Cologuard    4. Screening for diabetes mellitus  - Glucose    5. Screening for hyperlipidemia  - Lipid Cascade    6. Encounter for screening for lung cancer  Lung Cancer Screening pre-scan counseling Visit    The patient fits the risk profile of patients who benefit from this screening:  -The patient is >55 years old and <80 years old  -The patient has greater than 30 pack year history (over 30)  -The patient has smoked within the past 15 years  -The patient has no medical comorbidity severe enough that it would cause mortality prior to mortality due to the lung cancer attempting to be detected.    Discussion with patient regarding the harms associated with LDCT screening include false-negative and false-positive results, incidental findings, overdiagnosis, and radiation exposure were reviewed at length.   The patient understands that pursuing this screening test may result in a biopsy that was not necessary. It may also produced added stress over a nodule that is likely not cancer.    Of 100 patients who get screening, 25 will have a positive scan. Of those 25, only 1 will have cancer.  Overdiagnosis is estimated at 10% of patients-- they would not have been detected in the patient's lifetime without screening. Less than 1% of patients likely had death related to radiation exposure increase.   Average low-dose CT associated with 0.61 to 1.5 mSv. Annual background radiation exposure in the [place] averages 2.4 mS; mammogram is 0.7mSv.    The benefits are reduction in risk of death from lung  cancer. The number needed to treat is 320 (for every 320 patients who undergo screening, 1 patient will have a benefit in mortality from early detection from the screening).    Undergoing this screening implies willingness to pursue further potentially invasive testing to discover potential cancer.    All questions were answered.    The patient was counseled regarding smoking cessation and its risk for lung cancer.    The patient s results will be followed in our pulmonary registry and will be recalled based on the findings of the CT scan.    - CT Low Dose Lung Screening Chest; Future    7. Lung nodules - annual screen due 6/2018  As above    8. Tobacco abuse  As above    9. Mixed hyperlipidemia    10. Hypertension  She is not on medication, borderline continue to follow    11. Deafness, unspecified laterality    12. Osteopenia  - DXA Bone Density Scan; Future  - Vitamin D, Total (25-Hydroxy)  - HM2(CBC w/o Differential)    13. Vertigo  We will see if this improves with removal of her earwax.  I gave her handout on Epley maneuvers for benign paroxysmal positional vertigo.  If this does not improve within 7-10 days she will let me know and she may need to see ENT and/or neurology    Return in about 1 year (around 5/29/2019) for annual physical.     Chief Complaint   Chief Complaint   Patient presents with     Annual Exam     fasting        Patient Profile   Social History     Social History Narrative    [place]  Mynor.  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  No children.          Past Medical History   Patient Active Problem List   Diagnosis     Allergic Rhinitis     Mixed hyperlipidemia     Hypertension     Mammogram Right Breast Diffuse Calcification     Osteopenia     Vitamin D deficiency     Tobacco abuse     Deaf     COPD     Lung nodules - annual screen due 6/2018  "      Past Surgical History  She has a past surgical history that includes Ovarian cyst removal (Right, ).     History of Present Illness   This 63 y.o. old woman comes in for annual physical.  Overall she has been feeling okay.  Her main issues for the past month or so she has been having vertiginous symptoms when she looks to the right.  This will last for a brief period.  No other neurological deficits.  She has had this intermittently in the past but never this persistent.  No vision changes.  No hearing changes.  She has cut down on smoking to about a pack a week.    Review of Systems: A comprehensive review of systems was negative except as noted.     Medications and Allergies   Current Outpatient Prescriptions   Medication Sig Dispense Refill     fluticasone (FLONASE) 50 mcg/actuation nasal spray 2 sprays into each nostril daily. 16 g 11     No current facility-administered medications for this visit.      Allergies   Allergen Reactions     Cat Hair Std Allergenic Ext      Penicillins Shortness Of Breath        Family and Social History   Family History   Problem Relation Age of Onset     Breast cancer Maternal Aunt 75     Colon cancer Mother 79          Lung cancer Father 50          Other Brother      6 brothers and 1 sister     No Medical Problems Sister         Social History   Substance Use Topics     Smoking status: Current Every Day Smoker     Packs/day: 0.50     Types: Cigarettes     Smokeless tobacco: Never Used     Alcohol use No      Comment: rare        Physical Exam   General Appearance:   No acute distress    /78 (Patient Site: Left Arm, Patient Position: Sitting, Cuff Size: Adult Regular)  Pulse 100  Ht 5' 6\" (1.676 m)  Wt 122 lb (55.3 kg)  SpO2 97%  BMI 19.69 kg/m2    EYES: Eyelids, conjunctiva, and sclera were normal. Pupils were normal. Cornea, iris, and lens were normal bilaterally.  HEAD, EARS, NOSE, MOUTH, AND THROAT: Head and face were normal. Hearing was " normal to voice and the ears were normal to external exam and she has cerumen impaction on the right which is removed with a warm water lavage.  She has some erythema of the tympanic membrane and some scarring.  Nose appearance was normal and there was no discharge. Oropharynx was normal.  NECK: Neck appearance was normal. There were no neck masses and the thyroid was not enlarged.  RESPIRATORY: Breathing pattern was normal and the chest moved symmetrically.  Percussion/auscultatory percussion was normal.  Lung sounds were normal and there were no abnormal sounds.  CARDIOVASCULAR: Heart rate and rhythm were normal.  S1 and S2 were normal and there were no extra sounds or murmurs. Peripheral pulses in arms and legs were normal.  Jugular venous pressure was normal.  There was no peripheral edema.  GASTROINTESTINAL: The abdomen was normal in contour.  Bowel sounds were present.  Percussion detected no organ enlargement or tenderness.  Palpation detected no tenderness, mass, or enlarged organs.   MUSCULOSKELETAL: Skeletal configuration was normal and muscle mass was normal for age. Joint appearance was overall normal.  LYMPHATIC: There were no enlarged nodes.  SKIN/HAIR/NAILS: Skin color was normal.  There were no skin lesions.  Hair and nails were normal.  NEUROLOGIC: The patient was alert and oriented to person, place, time, and circumstance. Speech was normal. Cranial nerves were normal except that she is deaf. Motor strength was normal for age. The patient was normally coordinated.  PSYCHIATRIC:  Mood and affect were normal and the patient had normal recent and remote memory. The patient's judgment and insight were normal.       Additional Information        Tomasz Beltran MD  Internal Medicine  Contact me at [phone]

## 2021-06-19 NOTE — PROGRESS NOTES
Office Visit - Follow Up   Verena Lagunas   63 y.o. female    Date of Visit: 7/13/2018    Chief Complaint   Patient presents with     Diarrhea        Assessment and Plan   1. Diarrhea  I suspect she has C. difficile, will obtain stool culture and treat appropriately  - C. difficile Toxigenic by PCR; Future    2. Vitamin D deficiency  Continue vitamin D and calcium supplementation    3. Age-related osteoporosis without current pathological fracture  As above    4. Tobacco abuse  She is going to quit using patches and lozenges and I directed her to [place] quit plan.  The pulmonary clinic has contacted her to schedule a CT scan for lung cancer screening, but she just had one done.  I am not sure that they saw this.  I have left a message for Dr. Pool to call me  Return in about 4 weeks (around 8/10/2018).     History of Present Illness   This 63 y.o. old man comes in for evaluation of diarrhea.  She has had this for 2 weeks.  She had a a bone graft at the dentist office and received antibiotics before hand.  She is having numerous stools a day.  There is loose and watery.  Some abdominal cramping.  She tried some Pepto-Bismol which has helped minimally.  No history of C. difficile.  No fever chills.  No nausea or vomiting.  We reviewed her DEXA scan.  Shows osteoporosis.  We discussed bisphosphonate therapy but she is planning to get a dental implant in about 4 months.  I discussed with her that we should wait until after this test to start bisphosphonate.  Additionally we discussed smoking cessation.  She is going to stop in the next 4 months.  She like to use patches and gum.  We discussed [place] quit plan.  We discussed dosing.    Review of Systems: A comprehensive review of systems was negative except as noted.     Medications, Allergies and Problem List   Reviewed and updated     Physical Exam   General Appearance:   No acute distress    /78 (Patient Site: Left Arm, Patient Position: Sitting, Cuff  "Size: Adult Regular)  Pulse 100  Ht 5' 6\" (1.676 m)  Wt 119 lb (54 kg)  SpO2 99%  BMI 19.21 kg/m2    HEENT exam is unremarkable  Neck supple no thyromegaly or nodule palpable  Lymphatic no cervical lymphadenopathy  Cardiovascular regular rate and rhythm no murmur gallop or rub  Pulmonary lungs are clear to auscultation bilaterally  Gastrointestinall abdomen soft nontender nondistended no organomegaly  Neurologic exam is non focal  Psychiatric pleasant, no confusion or agitation        Additional Information   Current Outpatient Prescriptions   Medication Sig Dispense Refill     fluticasone (FLONASE) 50 mcg/actuation nasal spray 2 sprays into each nostril daily. 16 g 11     cholecalciferol, vitamin D3, (VITAMIN D3) 1,000 unit capsule Take 1 capsule (1,000 Units total) by mouth daily.  0     No current facility-administered medications for this visit.      Allergies   Allergen Reactions     Cat Hair Std Allergenic Ext      Penicillins Shortness Of Breath     Social History   Substance Use Topics     Smoking status: Current Every Day Smoker     Packs/day: 0.50     Types: Cigarettes     Smokeless tobacco: Never Used     Alcohol use No      Comment: rare       Review and/or order of clinical lab tests:  Review and/or order of radiology tests:  Review and/or order of medicine tests:  Discussion of test results with performing physician:  Decision to obtain old records and/or obtain history from someone other than the patient:  Review and summarization of old records and/or obtaining history from someone other than the patient and.or discussion of case with another health care provider:  Independent visualization of image, tracing or specimen itself:    Time:      Tomasz Beltran MD  "

## 2021-06-19 NOTE — LETTER
Letter by Tomasz Beltran MD at      Author: Tomasz Beltran MD Service: -- Author Type: --    Filed:  Encounter Date: 5/13/2019 Status: (Other)         05/13/19      Verena Lagunas  [address]   [place] [address]    Dear Verena,    As a valued [facility] patient, your healthcare needs are our priority. Our clinic records indicate we need to reschedule an appointment on Wed. June 26th at 2:00 pm. If you could please call back and reschedule the appt time and day that would be helpful, Dr. Beltran will be gone that afternoon.     Sincerely,    Remy Sy

## 2021-06-19 NOTE — LETTER
Letter by Tomasz Beltran MD at      Author: Tomasz Beltran MD Service: -- Author Type: --    Filed:  Encounter Date: 7/31/2019 Status: (Other)         July 31, 2019     Patient: eVrena Lagunas   YOB: 1955   Date of Visit: 7/31/2019       To Whom It May Concern:    It is my medical opinion that Verena Lagunas may return to work on August 5, 2019.  She has missed work due to medical condition.    If you have any questions or concerns, please don't hesitate to call.    Sincerely,        Electronically signed by Tomasz Beltran MD

## 2021-06-19 NOTE — LETTER
Letter by Tomasz Beltran MD at      Author: Tomasz Beltran MD Service: -- Author Type: --    Filed:  Encounter Date: 7/31/2019 Status: (Other)         July 31, 2019     Patient: Verena Lagunas   YOB: 1955   Date of Visit: 7/31/2019       To Whom it May Concern:    Verena Lagunas was seen in my clinic on 7/31/2019.    If you have any questions or concerns, please don't hesitate to call.    Sincerely,         Electronically signed by Tomasz Beltran MD

## 2021-06-20 ENCOUNTER — HEALTH MAINTENANCE LETTER (OUTPATIENT)
Age: 66
End: 2021-06-20

## 2021-06-20 NOTE — LETTER
Letter by Tomasz Beltran MD at      Author: Tomasz Beltran MD Service: -- Author Type: --    Filed:  Encounter Date: 8/14/2020 Status: (Other)         August 14, 2020     Patient: Verena Lagunas   YOB: 1955   Date of Visit: 8/14/2020       To Whom It May Concern:    It is my medical opinion that Verena Lagunas work 3 days a week until her medical condition improves.      If you have any questions or concerns, please don't hesitate to call.    Sincerely,        Electronically signed by Tomasz Beltran MD

## 2021-06-20 NOTE — LETTER
Letter by Tomasz Beltran MD at      Author: Tomasz Beltran MD Service: -- Author Type: --    Filed:  Encounter Date: 9/2/2020 Status: (Other)         September 2, 2020     Patient: Verena Lagunas   YOB: 1955   Date of Visit: 9/2/2020       To Whom It May Concern:    It is my medical opinion that Verena Lagunas should work 8 hours a day for 3 days a week. Due to her symptoms of vertigo.  She should continue this schedule until at least 9/22/20 when we reevaluate her at her appt.       If you have any questions or concerns, please don't hesitate to call.    Sincerely,        Electronically signed by Tomasz Beltran MD

## 2021-06-20 NOTE — LETTER
Letter by Tomasz Beltran MD at      Author: Tomasz Beltran MD Service: -- Author Type: --    Filed:  Encounter Date: 9/22/2020 Status: (Other)         September 22, 2020     Patient: Verena Lagunas   YOB: 1955   Date of Visit: 9/22/2020       To Whom It May Concern:      It is my medical opinion that Verena Lagunas should work 8 hours a day for 3 days a week. Due to her symptoms of vertigo.  She should continue this schedule until at least 10/22/20 when we reevaluate.       If you have any questions or concerns, please don't hesitate to call.    Sincerely,        Electronically signed by Tomasz Beltran MD

## 2021-06-20 NOTE — LETTER
Letter by Tomasz Beltran MD at      Author: Tomasz Beltran MD Service: -- Author Type: --    Filed:  Encounter Date: 8/26/2020 Status: (Other)         August 26, 2020     Patient: Verena Lagunas   YOB: 1955   Date of Visit: 8/26/2020       To Whom It May Concern:    It is my medical opinion that Verena Lagunas should work 3 days a week due to symptoms of vertigo.    If you have any questions or concerns, please don't hesitate to call.    Sincerely,        Electronically signed by Tomasz Beltran MD

## 2021-06-20 NOTE — LETTER
Letter by Tomasz Beltran MD at      Author: Tomasz Beltran MD Service: -- Author Type: --    Filed:  Encounter Date: 10/14/2020 Status: (Other)         October 14, 2020     Patient: Verena Lagunas   YOB: 1955   Date of Visit: 10/14/2020       To Whom It May Concern:    It is my medical opinion that Verena Lagunas should remain out of work for the next 8 weeks.  Due to her symptoms of vertigo.  This should start on  10/13/20.      If you have any questions or concerns, please don't hesitate to call.    Sincerely,        Electronically signed by Tomasz Beltran MD

## 2021-06-20 NOTE — LETTER
Letter by Tomasz Beltran MD at      Author: Tomasz Beltran MD Service: -- Author Type: --    Filed:  Encounter Date: 10/9/2020 Status: (Other)         October 9, 2020     Patient: Verena Lagunas   YOB: 1955   Date of Visit: 10/9/2020       To Whom It May Concern:    It is my medical opinion that Verena Lagunas should remain out of work for the next 8 weeks.  Due to her symptoms of vertigo.      If you have any questions or concerns, please don't hesitate to call.    Sincerely,        Electronically signed by Tomasz Beltran MD

## 2021-06-20 NOTE — LETTER
Letter by Tomasz Beltran MD at      Author: Tomasz Beltran MD Service: -- Author Type: --    Filed:  Encounter Date: 8/13/2020 Status: (Other)         August 13, 2020     Patient: Verena Lagunas   YOB: 1955   Date of Visit: 8/13/2020       To Whom It May Concern:    It is my medical opinion that Verena Lagunas work restricted hours at her job, and/or to work from home due to her medical issues.      If you have any questions or concerns, please don't hesitate to call.    Sincerely,        Electronically signed by Tomasz Beltran MD

## 2021-06-20 NOTE — LETTER
Letter by Tomasz Beltran MD at      Author: Tomasz Beltran MD Service: -- Author Type: --    Filed:  Encounter Date: 6/24/2020 Status: (Other)         June 24, 2020     Patient: Verena Lagunas   YOB: 1955   Date of Visit: 6/24/2020       To Whom it May Concern:    Verena Lagunas was seen in my clinic on 6/24/2020.  She has a service hearing dog.    If you have any questions or concerns, please don't hesitate to call.    Sincerely,         Electronically signed by Tomasz Beltran MD

## 2021-06-20 NOTE — PROGRESS NOTES
"  Office Visit - Follow Up   Verena Lagunas   63 y.o. female    Date of Visit: 9/4/2018    Chief Complaint   Patient presents with     Diarrhea     Nausea        Assessment and Plan   1. C. difficile diarrhea  Seems to have recurrent symptoms and she is off vancomycin.  I have asked her to finish 2 weeks of vancomycin 4 times daily at 125 mg capsule.  She is only been taking it 3 times per day.  At the end of 2 weeks have asked her take it twice a day for a week then once a day for a week then every other day for a month.  If she develops recurrent diarrhea have asked her to collect a stool sample and bring it in any containers been given  - C. difficile Toxigenic by PCR; Future    2. Simple chronic bronchitis (H)  Continue inhalers, sleep smoking cessation of    3. Tobacco abuse  Smoking cessation advised    Return in about 4 weeks (around 10/2/2018) for recheck.     History of Present Illness   This 63 y.o. old woman comes in for follow-up of diarrhea.  She was diagnosed with C. difficile on 7/16/2018.  She had received antibiotic for an upper respiratory infection.  She is given vancomycin 125 mg capsules, 1 capsule 4 times a day for 10 days.  This resolved the diarrhea.  Resume shortly thereafter.  She actually did not take the capsule 4 times a day.  She on average took it 3 times a day.  She apparently then received another course.  She then contacted me with ongoing diarrhea and some abdominal cramping and I recently refilled her prescription but asked her to come in to see me.  When she is to [employer] she does not have diarrhea.    Review of Systems: A comprehensive review of systems was negative except as noted.     Medications, Allergies and Problem List   Reviewed and updated     Physical Exam   General Appearance:   No acute    /72 (Patient Site: Right Arm, Patient Position: Sitting, Cuff Size: Adult Regular)  Pulse 89  Ht 5' 6\" (1.676 m)  Wt 120 lb (54.4 kg)  SpO2 98%  BMI 19.37 " kg/m2    HEENT exam is unremarkable  Neck supple no thyromegaly or nodule palpable  Lymphatic no cervical lymphadenopathy  Cardiovascular regular rate and rhythm no murmur gallop or rub  Pulmonary lungs are clear to auscultation bilaterally  Gastrointestinall abdomen soft nontender nondistended no organomegaly  Neurologic exam is non focal  Psychiatric pleasant, no confusion or agitation        Additional Information   Current Outpatient Prescriptions   Medication Sig Dispense Refill     cholecalciferol, vitamin D3, (VITAMIN D3) 1,000 unit capsule Take 1 capsule (1,000 Units total) by mouth daily.  0     fluticasone (FLONASE) 50 mcg/actuation nasal spray 2 sprays into each nostril daily. 16 g 11     vancomycin (VANCOCIN) 125 MG capsule Take 1 cap twice daily for 7 days, then 1 cap daily for 7 days, then 1 cap daily every other day for 1 month 35 capsule 0     No current facility-administered medications for this visit.      Allergies   Allergen Reactions     Cat Hair Std Allergenic Ext      Penicillins Shortness Of Breath     Social History   Substance Use Topics     Smoking status: Current Every Day Smoker     Packs/day: 0.50     Types: Cigarettes     Smokeless tobacco: Never Used     Alcohol use No      Comment: rare       Review and/or order of clinical lab tests:  Review and/or order of radiology tests:  Review and/or order of medicine tests:  Discussion of test results with performing physician:  Decision to obtain old records and/or obtain history from someone other than the patient:  Review and summarization of old records and/or obtaining history from someone other than the patient and.or discussion of case with another health care provider:  Independent visualization of image, tracing or specimen itself:    Time:      Tomasz Beltran MD

## 2021-06-21 NOTE — LETTER
Letter by Tomasz Beltran MD at      Author: Tomasz Beltran MD Service: -- Author Type: --    Filed:  Encounter Date: 3/22/2021 Status: (Other)         March 22, 2021     Patient: Verena Lagunas   YOB: 1955   Date of Visit: 3/22/2021       To Whom it May Concern:    Verena Lagunas was seen in my clinic on 3/22/2021.  She may resume work full time on 3/29/2021 without restriction.    If you have any questions or concerns, please don't hesitate to call.    Sincerely,         Electronically signed by Tomasz Beltrna MD

## 2021-06-22 NOTE — TELEPHONE ENCOUNTER
Left detailed message for the patient relaying message below from Dr. Beltran.  Asked the patient to call if she has any further questions.    Chole LINTON CMA/CMT....................9:04 AM

## 2021-06-22 NOTE — TELEPHONE ENCOUNTER
Patient Returning Call  Reason for call: Patient calling back with INERPRETER , Pt is all better , diarrhea has resolved.      Information relayed to patient:   Patient has additional questions:  Yes  If YES, what are your questions/concerns:    Okay to leave a detailed message?:  Yes

## 2021-06-22 NOTE — TELEPHONE ENCOUNTER
She should have finished these medications weeks ago.  We discussed at our last visit and her diarrhea had resolved.   She should not have any medication left

## 2021-06-22 NOTE — TELEPHONE ENCOUNTER
SIgn language interpretor call.    Med refill question.    Is she suppose to be taking both Vancocin and rifaxlmin at the same time?    Patient wants to know why the vancocin was added??    Wants clairification on why she is taking the Xifaxan? And vancocin?    SHe says there is no way for her to take both at once. She said she is staying home today and waiting to hear back from provider.    Provider please advise    Ok to leave detailed message  [phone] to leave message.-not home number    Love Flores, RN, [facility] Nurse Triage/Med Refills RN

## 2021-06-22 NOTE — TELEPHONE ENCOUNTER
Dr. Beltran-ok to wait  I left Verena a detailed message with Dr. Jansen's recommendations.  Please respond with your recommendations.  Thanks.

## 2021-06-22 NOTE — TELEPHONE ENCOUNTER
Reason for Disposition    Caller has URGENT medication question about med that PCP prescribed and triager unable to answer question    Protocols used: MEDICATION QUESTION CALL-A-AH

## 2021-06-22 NOTE — PROGRESS NOTES
Office Visit - Follow Up   Verena Lagunas   63 y.o. female    Date of Visit: 12/4/2018    Chief Complaint   Patient presents with     Referral     colonoscopy     Diarrhea     Follow up         Assessment and Plan   1. Recurrent Clostridium difficile diarrhea  This seems to have resolved after vancomycin followed by rifaximin.  She is going to cancel her GI consultation as she is having issues with logistics.    2[facility]    3. Tobacco abuse  She is planning to quit and has order patches, excellent    Return in about 4 weeks (around 1/1/2019) for recheck.     History of Present Illness   This 63 y.o. old woman comes in for follow-up of recurrent Clostridium difficile infection.  She initially developed this after antibiotics this summer.  Treated per routine with 10 days of vancomycin.  Her symptoms returned and she was then treated with a tapering course of vancomycin.  Her symptoms again returned and therefore she was treated with 10 days of vancomycin followed by 20 days of rifaximin.  She has now finished this and her diarrhea has resolved.  Her appetite is improved.  She has no abdominal pain no nausea or vomiting no diarrhea no blood in her stool.  She has an appointment with [place] [facility] on Tuesday but this is going to be very difficult for her to attend because of transportation and  issues.  She would like to cancel this appointment.  She does need colon cancer screening.  She also states to be very difficult for her to do colonoscopy.  She also is going to quit smoking and has contact [place] quit plan will be receiving patches in the mail.    Review of Systems: A comprehensive review of systems was negative except as noted.     Medications, Allergies and Problem List   Reviewed, reconciled and updated     Physical Exam   General Appearance:   No acute distress    /68 (Patient Site: Right Arm, Patient Position: Sitting, Cuff Size: Adult  "Regular)   Pulse 86   Ht 5' 6\" (1.676 m)   Wt 122 lb (55.3 kg)   SpO2 98%   BMI 19.69 kg/m      HEENT exam is unremarkable  Neck supple no thyromegaly or nodule palpable  Lymphatic no cervical lymphadenopathy  Cardiovascular regular rate and rhythm no murmur gallop or rub  Pulmonary lungs are clear to auscultation bilaterally  Gastrointestinall abdomen soft nontender nondistended no organomegaly  Psychiatric pleasant, no confusion or agitation        Additional Information   Current Outpatient Medications   Medication Sig Dispense Refill     cholecalciferol, vitamin D3, (VITAMIN D3) 1,000 unit capsule Take 1 capsule (1,000 Units total) by mouth daily.  0     fluticasone (FLONASE) 50 mcg/actuation nasal spray 2 sprays into each nostril daily. 16 g 11     No current facility-administered medications for this visit.      Allergies   Allergen Reactions     Cat Hair Std Allergenic Ext      Penicillins Shortness Of Breath     Social History     Tobacco Use     Smoking status: Current Every Day Smoker     Packs/day: 0.50     Types: Cigarettes     Smokeless tobacco: Never Used   Substance Use Topics     Alcohol use: No     Comment: rare     Drug use: No       Review and/or order of clinical lab tests:  Review and/or order of radiology tests:  Review and/or order of medicine tests:  Discussion of test results with performing physician:  Decision to obtain old records and/or obtain history from someone other than the patient:  Review and summarization of old records and/or obtaining history from someone other than the patient and.or discussion of case with another health care provider:  Independent visualization of image, tracing or specimen itself:    Time:      Tomasz Beltran MD  "

## 2021-06-23 NOTE — PROGRESS NOTES
"  Office Visit - Follow Up   Verena Lagunas   63 y.o. female    Date of Visit: 1/15/2019    Chief Complaint   Patient presents with     Med check bone scan        Assessment and Plan   1. Recurrent Clostridium difficile diarrhea  - C. difficile Toxigenic by PCR; Future    2. Tobacco abuse  Cessation advised nicotine replacement prescribed, [place] quit plan has been contacted    3. Vitamin D deficiency  Continue vitamin D    4. Hypertension  Controlled off medication    5. Age-related osteoporosis without current pathological fracture  Start Fosamax once dental procedures done    Return in about 4 weeks (around 2/12/2019) for recheck.     History of Present Illness   This 63 y.o. old woman comes in for follow-up.  Right before Dianna she had a tooth that cracked.  She saw a dentist.  She had a temporary crown.  She did not receive antibiotics.  She developed diarrhea she called the clinic and it sounds like she was put on rifaximin.  Her diarrhea is resolved but she still has some cramping and occasional abnormal stool.  No fever or chills.  She has had recurrent colostrum difficile infection.  We also discussed osteoporosis, with her upcoming dental procedures we will hold off on treatment.  She has not started Fosamax.  She has trying to do weightbearing exercise take calcium and vitamin D.  She continues to smoke and would like to quit and is contacted [place] quit plan    Review of Systems: A comprehensive review of systems was negative except as noted.     Medications, Allergies and Problem List   Reviewed, reconciled and updated     Physical Exam   General Appearance:   No acute distress    /70 (Patient Site: Right Arm, Patient Position: Sitting, Cuff Size: Adult Regular)   Pulse 87   Ht 5' 6\" (1.676 m)   Wt 124 lb (56.2 kg)   SpO2 97%   BMI 20.01 kg/m      HEENT exam is unremarkable  Neck supple no thyromegaly or nodule palpable  Lymphatic no cervical lymphadenopathy  Cardiovascular regular " rate and rhythm no murmur gallop or rub  Pulmonary lungs are clear to auscultation bilaterally  Gastrointestinall abdomen soft nontender nondistended no organomegaly  Neurologic exam is non focal  Psychiatric pleasant, no confusion or agitation        Additional Information   Current Outpatient Medications   Medication Sig Dispense Refill     cholecalciferol, vitamin D3, (VITAMIN D3) 1,000 unit capsule Take 1 capsule (1,000 Units total) by mouth daily.  0     fluticasone (FLONASE) 50 mcg/actuation nasal spray 2 sprays into each nostril daily. 16 g 11     nicotine (NICODERM CQ) 14 mg/24 hr Place 1 patch on the skin daily. 30 patch 5     No current facility-administered medications for this visit.      Allergies   Allergen Reactions     Cat Hair Std Allergenic Ext      Penicillins Shortness Of Breath     Social History     Tobacco Use     Smoking status: Current Every Day Smoker     Packs/day: 0.50     Types: Cigarettes     Smokeless tobacco: Never Used   Substance Use Topics     Alcohol use: No     Comment: rare     Drug use: No       Review and/or order of clinical lab tests:  Review and/or order of radiology tests:  Review and/or order of medicine tests:  Discussion of test results with performing physician:  Decision to obtain old records and/or obtain history from someone other than the patient:  Review and summarization of old records and/or obtaining history from someone other than the patient and.or discussion of case with another health care provider:  Independent visualization of image, tracing or specimen itself:    Time:      Tomasz Beltran MD

## 2021-06-25 NOTE — TELEPHONE ENCOUNTER
Left message to call back for: Verena  Information to relay to patient:  Please relay message below from Dr. Beltran and help the patient to schedule appointment.  Unable to leave a message for the patient.  She does not have a voice mail that is set up.  Thank you.    Chloe LINTON CMA/LEILA....................11:27 AM

## 2021-06-25 NOTE — TELEPHONE ENCOUNTER
Left message to call back for: Advice from PCP  Information to relay to patient:  See below information.     Patient does not have a voice mail system set up.  Shantel Tadeo, CMA

## 2021-06-25 NOTE — TELEPHONE ENCOUNTER
Calling through a        CC:  Balance problems and dry eye(s)     > Not sure how long either issue has been going on for     > Did not wish to provide other details and just wanted to make appt specifically with their provider         A/P:     > I related that some questions / details are needed to ensure that appt times are long enough to have concerns addressed - she understands this       > Warm transfer in with scheduling - appt for the 26th   (earlist would be this afternoon but could be issue with arranging for )        Morales Barnett RN   Triage and Medication Refills

## 2021-07-03 NOTE — ADDENDUM NOTE
Addendum Note by Salud Diaz RN at 7/17/2018 10:20 AM     Author: Salud Diaz RN Service: -- Author Type: Registered Nurse    Filed: 7/17/2018 10:20 AM Encounter Date: 7/12/2018 Status: Signed    : Salud Diaz RN (Registered Nurse)    Addended by: SALUD DIAZ on: 7/17/2018 10:20 AM        Modules accepted: Orders

## 2021-07-03 NOTE — ADDENDUM NOTE
Addendum Note by Damián Schaefer LPN at 6/13/2017  9:50 AM     Author: Damián Schaefer LPN Service: -- Author Type: Licensed Nurse    Filed: 6/13/2017  9:50 AM Encounter Date: 6/12/2017 Status: Signed    : Damián Schaefer LPN (Licensed Nurse)    Addended by: DAMIÁN SCHAEFER on: 6/13/2017 09:50 AM        Modules accepted: Orders

## 2021-07-22 ENCOUNTER — RECORDS - HEALTHEAST (OUTPATIENT)
Dept: INTERNAL MEDICINE | Facility: CLINIC | Age: 66
End: 2021-07-22

## 2021-07-22 DIAGNOSIS — Z00.00 ROUTINE GENERAL MEDICAL EXAMINATION AT A HEALTH CARE FACILITY: ICD-10-CM

## 2021-08-06 NOTE — PATIENT INSTRUCTIONS - HE
Patient Instructions by Tomasz Beltran MD at 9/22/2020  2:00 PM     Author: Tomasz Beltran MD Service: -- Author Type: Physician    Filed: 9/22/2020  4:52 PM Encounter Date: 9/22/2020 Status: Signed    : Tomasz Beltran MD (Physician)         Patient Education     Your Health Risk Assessment indicates you feel you are not in good physical health.    A healthy lifestyle helps keep the body fit and the mind alert. It helps protect you from disease, helps you fight disease, and helps prevent chronic disease (disease that doesn't go away) from getting worse. This is important as you get older and begin to notice twinges in muscles and joints and a decline in the strength and stamina you once took for granted. A healthy lifestyle includes good healthcare, good nutrition, weight control, recreation, and regular exercise. Avoid harmful substances and do what you can to keep safe. Another part of a healthy lifestyle is stay mentally active and socially involved.    Good healthcare     Have a wellness visit every year.     If you have new symptoms, let us know right away. Don't wait until the next checkup.     Take medicines exactly as prescribed and keep your medicines in a safe place. Tell us if your medicine causes problems.   Healthy diet and weight control     Eat 3 or 4 small, nutritious, low-fat, high-fiber meals a day. Include a variety of fruits, vegetables, and whole-grain foods.     Make sure you get enough calcium in your diet. Calcium, vitamin D, and exercise help prevent osteoporosis (bone thinning).     If you live alone, try eating with others when you can. That way you get a good meal and have company while you eat it.     Try to keep a healthy weight. If you eat more calories than your body uses for energy, it will be stored as fat and you will gain weight.     Recreation   Recreation is not limited to sports and team events. It includes any activity that provides relaxation,  interest, enjoyment, and exercise. Recreation provides an outlet for physical, mental, and social energy. It can give a sense of worth and achievement. It can help you stay healthy.       Patient Education   Your Health Risk Assessment indicates you feel you are not in good emotional health.    Recreation   Recreation is not limited to sports and team events. It includes any activity that provides relaxation, interest, enjoyment, and exercise. Recreation provides an outlet for physical, mental, and social energy. It can give a sense of worth and achievement. It can help you stay healthy.    Mental Exercise and Social Involvement  Mental and emotional health is as important as physical health. Keep in touch with friends and family. Stay as active as possible. Continue to learn and challenge yourself.   Things you can do to stay mentally active are:    Learn something new, like a foreign language or musical instrument.     Play SCRABBLE or do crossword puzzles. If you cannot find people to play these games with you at home, you can play them with others on your computer through the Internet.     Join a games club--anything from card games to chess or checkers or lawn bowling.     Start a new hobby.     Go back to school.     Volunteer.     Read.     Keep up with world events.       Patient Education   Depression and Suicide in Older Adults  Nearly 2 million older Americans have some type of depression. Sadly, some of them even take their own lives. Yet depression among older adults is often ignored. Learn the warning signs. You may help spare a loved one needless pain. You may also save a life.       What Is Depression?  Depression is a mood disorder that affects the way you think and feel. The most common symptom is a feeling of deep sadness. People who are depressed also may seem tired and listless. And nothing seems to give them pleasure. Its normal to grieve or be sad sometimes. But sadness lessens or passes with  time. Depression rarely goes away or improves on its own. Other symptoms of depression are:    Sleeping more or less than normal    Eating more or less than normal    Having headaches, stomachaches, or other pains that dont go away    Feeling nervous, empty, or worthless    Crying a great deal    Thinking or talking about suicide or death    Feeling confused or forgetful  What Causes It?  The causes of depression arent fully known. Certain chemicals in the brain play a role. Depression does run in families. And life stresses can also trigger depression in some people. That may be the case with older adults. They often face great burdens, such as the death of friends or a spouse. They may have failing health. And they are more likely to be alone, lonely, or poor.  How You Can Help  Often, depressed people may not want to ask for help. When they do, they may be ignored. Or, they may receive the wrong treatment. You can help by showing parents and older friends love and support. If they seem depressed, help them find the right treatment. Talk to your doctor. Or contact a local mental health center, social service agency, or hospital. With modern treatment, no one has to suffer from depression.  Resources:    National [place] of Mental Health  282.670.3614  www.nimh.nih.gov    National [place] on Mental Illness  295.269.2463  www.lopez.org    Mental Health [facility]  547.360.4635  www.[email or web address].org    National Suicide Hotline  617.995.4294 (800-SUICIDE)      6730-2854 [employer]. [address] [place] [address]. All rights reserved. This information is not intended as a substitute for professional medical care. Always follow your healthcare professional's instructions.         Patient Education   Understanding Advance Care Planning  Advance care planning is the process of deciding ones own future medical care. It helps ensure that if you cant speak for yourself, your wishes can still be carried out.  The plan is a series of legal documents that note a persons wishes. The documents vary by state. Advance care planning may be done when a person has a serious illness that is expected to get worse. It may be done before major surgery. And it can help you and your family be prepared in case of a major illness or injury. Advance care planning helps with making decisions at these times.       A health care proxy is a person who acts as the voice of a patient when the patient cant speak for himself or herself. The name of this role varies by state. It may be called a Durable Medical Power of  or Durable Power of  for Healthcare. It may be called an agent, surrogate, or advocate. Or it may be called a representative or decision maker. It is an official duty that is identified by a legal document. The document also varies by state.    Why Is Advance Care Planning Important?  If a person communicates their healthcare wishes:    They will be given medical care that matches their values and goals.    Their family members will not be forced to make decisions in a crisis with no guidance.  Creating a Plan  Making an advance care plan is often done in 3 steps:    Thinking about ones wishes. To create an advance care plan, you should think about what kind of medical treatment you would want if you lose the ability to communicate. Are there any situations in which you would refuse or stop treatment? Are there therapies you would want or not want? And whom do you want to make decisions for you? There are many places to learn more about how to plan for your care. Ask your doctor or  for resources.    Picking a health care proxy. This means choosing a trusted person to speak for you only when you cant speak for yourself. When you cannot make medical decisions, your proxy makes sure the instructions in your advance care plan are followed. A proxy does not make decisions based on his or her own opinions.  They must put aside those opinions and values if needed, and carry out your wishes.    Filling out the legal documents. There are several kinds of legal documents for advance care planning. Each one tells health care providers your wishes. The documents may vary by state. They must be signed and may need to be witnessed or notarized. You can cancel or change them whenever you wish. Depending on your state, the documents may include a Healthcare Proxy form, Living Will, Durable Medical Power of , Advance Directive, or others.  The Familys Role  The best help a family can give is to support their loved ones wishes. Open and honest communication is vital. Family should express any concerns they have about the patients choices while the patient can still make decisions.    2931-1089 [employer]. [address] [place] [address]. All rights reserved. This information is not intended as a substitute for professional medical care. Always follow your healthcare professional's instructions.         Also, [facility] offers a free, downloadable health care directive that allows you to share your treatment choices and personal preferences if you cannot communicate your wishes. It also allows you to appoint another person (called a health care agent) to make health care decisions if you are unable to do so. You can download an advance directive by going here: [email or web address]     Patient Education   Personalized Prevention Plan  You are due for the preventive services outlined below.  Your care team is available to assist you in scheduling these services.  If you have already completed any of these items, please share that information with your care team to update in your medical record.  Health Maintenance   Topic Date Due   ? HEPATITIS C SCREENING  1955   ? COPD ACTION PLAN  1955   ? COLORECTAL CANCER SCREENING  01/30/1972   ? ZOSTER  VACCINES (1 of 2) 01/30/2004   ? DXA SCAN  06/18/2020   ? MAMMOGRAM  08/26/2020   ? MEDICARE ANNUAL WELLNESS VISIT  09/03/2020   ? TD 18+ HE  06/01/2021   ? PNEUMOCOCCAL IMMUNIZATION 65+ LOW/MEDIUM RISK (2 of 2 - PPSV23) 09/22/2021   ? FALL RISK ASSESSMENT  09/22/2021   ? LIPID  09/03/2024   ? ADVANCE CARE PLANNING  09/03/2024   ? SPIROMETRY  Completed   ? INFLUENZA VACCINE RULE BASED  Completed   ? HEPATITIS B VACCINES  Aged Out

## 2021-08-15 ENCOUNTER — HEALTH MAINTENANCE LETTER (OUTPATIENT)
Age: 66
End: 2021-08-15

## 2021-09-10 DIAGNOSIS — I10 ESSENTIAL HYPERTENSION: ICD-10-CM

## 2021-09-10 RX ORDER — LOSARTAN POTASSIUM AND HYDROCHLOROTHIAZIDE 12.5; 5 MG/1; MG/1
1 TABLET ORAL DAILY
Qty: 90 TABLET | Refills: 2 | Status: SHIPPED | OUTPATIENT
Start: 2021-09-10 | End: 2022-06-03

## 2021-09-10 NOTE — TELEPHONE ENCOUNTER
"Last Written Prescription Date:  9/22/20  Last Fill Quantity: 90,  # refills: 3   Last office visit provider:  3/22/21     Requested Prescriptions   Pending Prescriptions Disp Refills     losartan-hydrochlorothiazide (HYZAAR) 50-12.5 MG tablet [Pharmacy Med Name: LOSARTAN/HCTZ 50/12.5MG TABLETS] 90 tablet 3     Sig: TAKE 1 TABLET BY MOUTH DAILY       Angiotensin-II Receptors Passed - 9/10/2021  5:38 AM        Passed - Last blood pressure under 140/90 in past 12 months     BP Readings from Last 3 Encounters:   03/22/21 120/72   02/05/21 112/74   11/25/20 128/62                 Passed - Recent (12 mo) or future (30 days) visit within the authorizing provider's specialty     Patient has had an office visit with the authorizing provider or a provider within the authorizing providers department within the previous 12 mos or has a future within next 30 days. See \"Patient Info\" tab in inbasket, or \"Choose Columns\" in Meds & Orders section of the refill encounter.              Passed - Medication is active on med list        Passed - Patient is age 18 or older        Passed - No active pregnancy on record        Passed - Normal serum creatinine on file in past 12 months     Recent Labs   Lab Test 09/22/20  1507   CR 0.79       Ok to refill medication if creatinine is low          Passed - Normal serum potassium on file in past 12 months     Recent Labs   Lab Test 09/22/20  1507   POTASSIUM 4.2                    Passed - No positive pregnancy test in past 12 months       Diuretics (Including Combos) Protocol Passed - 9/10/2021  5:38 AM        Passed - Blood pressure under 140/90 in past 12 months     BP Readings from Last 3 Encounters:   03/22/21 120/72   02/05/21 112/74   11/25/20 128/62                 Passed - Recent (12 mo) or future (30 days) visit within the authorizing provider's specialty     Patient has had an office visit with the authorizing provider or a provider within the authorizing providers department " "within the previous 12 mos or has a future within next 30 days. See \"Patient Info\" tab in inbasket, or \"Choose Columns\" in Meds & Orders section of the refill encounter.              Passed - Medication is active on med list        Passed - Patient is age 18 or older        Passed - No active pregancy on record        Passed - Normal serum creatinine on file in past 12 months     Recent Labs   Lab Test 09/22/20  1507   CR 0.79              Passed - Normal serum potassium on file in past 12 months     Recent Labs   Lab Test 09/22/20  1507   POTASSIUM 4.2                    Passed - Normal serum sodium on file in past 12 months     Recent Labs   Lab Test 09/22/20  1507                 Passed - No positive pregnancy test in past 12 months             Holden Washington RN 09/10/21 3:24 PM  "

## 2021-09-27 ENCOUNTER — OFFICE VISIT (OUTPATIENT)
Dept: INTERNAL MEDICINE | Facility: CLINIC | Age: 66
End: 2021-09-27
Payer: COMMERCIAL

## 2021-09-27 VITALS
WEIGHT: 134.9 LBS | BODY MASS INDEX: 22.48 KG/M2 | OXYGEN SATURATION: 99 % | SYSTOLIC BLOOD PRESSURE: 112 MMHG | HEIGHT: 65 IN | HEART RATE: 88 BPM | DIASTOLIC BLOOD PRESSURE: 76 MMHG

## 2021-09-27 DIAGNOSIS — Z00.00 ANNUAL PHYSICAL EXAM: Primary | ICD-10-CM

## 2021-09-27 DIAGNOSIS — Z87.891 PERSONAL HISTORY OF TOBACCO USE: ICD-10-CM

## 2021-09-27 DIAGNOSIS — M81.0 AGE-RELATED OSTEOPOROSIS WITHOUT CURRENT PATHOLOGICAL FRACTURE: ICD-10-CM

## 2021-09-27 DIAGNOSIS — E78.2 MIXED HYPERLIPIDEMIA: ICD-10-CM

## 2021-09-27 DIAGNOSIS — I10 ESSENTIAL HYPERTENSION: ICD-10-CM

## 2021-09-27 DIAGNOSIS — Z12.31 VISIT FOR SCREENING MAMMOGRAM: ICD-10-CM

## 2021-09-27 DIAGNOSIS — E55.9 VITAMIN D DEFICIENCY: ICD-10-CM

## 2021-09-27 DIAGNOSIS — R42 VERTIGO: ICD-10-CM

## 2021-09-27 DIAGNOSIS — R91.8 LUNG NODULES: ICD-10-CM

## 2021-09-27 DIAGNOSIS — K21.9 GASTROESOPHAGEAL REFLUX DISEASE WITHOUT ESOPHAGITIS: ICD-10-CM

## 2021-09-27 DIAGNOSIS — Z11.59 NEED FOR HEPATITIS C SCREENING TEST: ICD-10-CM

## 2021-09-27 DIAGNOSIS — Z12.11 SCREEN FOR COLON CANCER: ICD-10-CM

## 2021-09-27 DIAGNOSIS — J41.0 SIMPLE CHRONIC BRONCHITIS (H): ICD-10-CM

## 2021-09-27 LAB
ALBUMIN SERPL-MCNC: 3.6 G/DL (ref 3.5–5)
ALP SERPL-CCNC: 58 U/L (ref 45–120)
ALT SERPL W P-5'-P-CCNC: 21 U/L (ref 0–45)
ANION GAP SERPL CALCULATED.3IONS-SCNC: 11 MMOL/L (ref 5–18)
AST SERPL W P-5'-P-CCNC: 27 U/L (ref 0–40)
BILIRUB SERPL-MCNC: 0.4 MG/DL (ref 0–1)
BUN SERPL-MCNC: 16 MG/DL (ref 8–22)
CALCIUM SERPL-MCNC: 9.5 MG/DL (ref 8.5–10.5)
CHLORIDE BLD-SCNC: 101 MMOL/L (ref 98–107)
CHOLEST SERPL-MCNC: 164 MG/DL
CO2 SERPL-SCNC: 25 MMOL/L (ref 22–31)
CREAT SERPL-MCNC: 0.74 MG/DL (ref 0.6–1.1)
ERYTHROCYTE [DISTWIDTH] IN BLOOD BY AUTOMATED COUNT: 12.5 % (ref 10–15)
FASTING STATUS PATIENT QL REPORTED: NO
GFR SERPL CREATININE-BSD FRML MDRD: 84 ML/MIN/1.73M2
GLUCOSE BLD-MCNC: 99 MG/DL (ref 70–125)
HCT VFR BLD AUTO: 39.2 % (ref 35–47)
HDLC SERPL-MCNC: 79 MG/DL
HGB BLD-MCNC: 12.9 G/DL (ref 11.7–15.7)
LDLC SERPL CALC-MCNC: 65 MG/DL
MCH RBC QN AUTO: 29.4 PG (ref 26.5–33)
MCHC RBC AUTO-ENTMCNC: 32.9 G/DL (ref 31.5–36.5)
MCV RBC AUTO: 89 FL (ref 78–100)
PLATELET # BLD AUTO: 281 10E3/UL (ref 150–450)
POTASSIUM BLD-SCNC: 4.2 MMOL/L (ref 3.5–5)
PROT SERPL-MCNC: 6.6 G/DL (ref 6–8)
RBC # BLD AUTO: 4.39 10E6/UL (ref 3.8–5.2)
SODIUM SERPL-SCNC: 137 MMOL/L (ref 136–145)
TRIGL SERPL-MCNC: 100 MG/DL
TSH SERPL DL<=0.005 MIU/L-ACNC: 0.62 UIU/ML (ref 0.3–5)
WBC # BLD AUTO: 9.7 10E3/UL (ref 4–11)

## 2021-09-27 PROCEDURE — G0296 VISIT TO DETERM LDCT ELIG: HCPCS | Performed by: INTERNAL MEDICINE

## 2021-09-27 PROCEDURE — 84443 ASSAY THYROID STIM HORMONE: CPT | Performed by: INTERNAL MEDICINE

## 2021-09-27 PROCEDURE — 99397 PER PM REEVAL EST PAT 65+ YR: CPT | Performed by: INTERNAL MEDICINE

## 2021-09-27 PROCEDURE — 36415 COLL VENOUS BLD VENIPUNCTURE: CPT | Performed by: INTERNAL MEDICINE

## 2021-09-27 PROCEDURE — 82306 VITAMIN D 25 HYDROXY: CPT | Performed by: INTERNAL MEDICINE

## 2021-09-27 PROCEDURE — 99214 OFFICE O/P EST MOD 30 MIN: CPT | Mod: 25 | Performed by: INTERNAL MEDICINE

## 2021-09-27 PROCEDURE — 80061 LIPID PANEL: CPT | Performed by: INTERNAL MEDICINE

## 2021-09-27 PROCEDURE — 80053 COMPREHEN METABOLIC PANEL: CPT | Performed by: INTERNAL MEDICINE

## 2021-09-27 PROCEDURE — 86803 HEPATITIS C AB TEST: CPT | Performed by: INTERNAL MEDICINE

## 2021-09-27 PROCEDURE — 85027 COMPLETE CBC AUTOMATED: CPT | Performed by: INTERNAL MEDICINE

## 2021-09-27 ASSESSMENT — MIFFLIN-ST. JEOR: SCORE: 1147.78

## 2021-09-27 ASSESSMENT — ACTIVITIES OF DAILY LIVING (ADL): CURRENT_FUNCTION: NO ASSISTANCE NEEDED

## 2021-09-27 NOTE — PATIENT INSTRUCTIONS
Patient Education   Personalized Prevention Plan  You are due for the preventive services outlined below.  Your care team is available to assist you in scheduling these services.  If you have already completed any of these items, please share that information with your care team to update in your medical record.  Health Maintenance Due   Topic Date Due     ANNUAL REVIEW OF HM ORDERS  Never done     COPD Action Plan  Never done     Colorectal Cancer Screening  Never done     Hepatitis C Screening  Never done     Zoster (Shingles) Vaccine (1 of 2) Never done     Mammogram  08/26/2020     PHQ-2  01/01/2021     Diptheria Tetanus Pertussis (DTAP/TDAP/TD) Vaccine (2 - Td or Tdap) 06/01/2021     Flu Vaccine (1) 09/01/2021     FALL RISK ASSESSMENT  09/22/2021     Annual Wellness Visit  09/22/2021        Lung Cancer Screening   Frequently Asked Questions  If you are at high-risk for lung cancer, getting screened with low-dose computed tomography (LDCT) every year can help save your life. This handout offers answers to some of the most common questions about lung cancer screening. If you have other questions, please call [phone] ([phone]).     What is it?  Lung cancer screening uses special X-ray technology to create an image of your lung tissue. The exam is quick and easy and takes less than 10 seconds. We don t give you any medicine or use any needles. You can eat before and after the exam. You don t need to change your clothes as long as the clothing on your chest doesn t contain metal. But, you do need to be able to hold your breath for at least 6 seconds during the exam.    What is the goal of lung cancer screening?  The goal of lung cancer screening is to save lives. Many times, lung cancer is not found until a person starts having physical symptoms. Lung cancer screening can help detect lung cancer in the earliest stages when it may be easier to treat.    Who should be screened for lung cancer?  We  suggest lung cancer screening for anyone who is at high-risk for lung cancer. You are in the high-risk group if you:      are between the ages of 55 and 79, and    have smoked at least 1 pack of cigarettes a day for 30 or more years, and    still smoke or have quit within the past 15 years.    However, if you have a new cough or shortness of breath, you should talk to your doctor before being screened.    Some national lung health advocacy groups also recommend screening for people ages 50 to 79 who have smoked an average of 1 pack of cigarettes a day for 20 years. They must also have at least 1 other risk factor for lung cancer, not including exposure to secondhand smoke. Other risk factors are having had cancer in the past, emphysema, pulmonary fibrosis, COPD, a family history of lung cancer, or exposure to certain materials such as arsenic, asbestos, beryllium, cadmium, chromium, diesel fumes, nickel, radon or silica. Your care team can help you know if you have one of these risk factors.     Why does it matter if I have symptoms?  Certain symptoms can be a sign that you have a condition in your lungs that should be checked and treated by your doctor. These symptoms include fever, chest pain, a new or changing cough, shortness of breath that you have never felt before, coughing up blood or unexplained weight loss. Having any of these symptoms can greatly affect the results of lung cancer screening.       Should all smokers get an LDCT lung cancer screening exam?  It depends. Lung cancer screening is for a very specific group of men and women who have a history of heavy smoking over a long period of time (see  Who should be screened for lung cancer  above).  I am in the high-risk group, but have been diagnosed with cancer in the past. Is LDCT lung cancer screening right for me?  In some cases, you should not have LDCT lung screening, such as when your doctor is already following your cancer with CT scan studies.  Your doctor will help you decide if LDCT lung screening is right for you.  Do I need to have a screening exam every year?  Yes. If you are in the high-risk group described earlier, you should get an LDCT lung cancer screening exam every year until you are 79, or are no longer willing or able to undergo screening and possible procedures to diagnose and treat lung cancer.  How effective is LDCT at preventing death from lung cancer?  Studies have shown that LDCT lung cancer screening can lower the risk of death from lung cancer by 20 percent in people who are at high-risk.  What are the risks?  There are some risks and limitations of LDCT lung cancer screening. We want to make sure you understand the risks and benefits, so please let us know if you have any questions. Your doctor may want to talk with you more about these risks.    Radiation exposure: As with any exam that uses radiation, there is a very small increased risk of cancer. The amount of radiation in LDCT is small--about the same amount a person would get from a mammogram. Your doctor orders the exam when he or she feels the potential benefits outweigh the risks.    False negatives: No test is perfect, including LDCT. It is possible that you may have a medical condition, including lung cancer, that is not found during your exam. This is called a false negative result.    False positives and more testing: LDCT very often finds something in the lung that could be cancer, but in fact is not. This is called a false positive result. False positive tests often cause anxiety. To make sure these findings are not cancer, you may need to have more tests. These tests will be done only if you give us permission. Sometimes patients need a treatment that can have side effects, such as a biopsy. For more information on false positives, see  What can I expect from the results?     Findings not related to lung cancer: Your LDCT exam also takes pictures of areas of your body  next to your lungs. In a very small number of cases, the CT scan will show an abnormal finding in one of these areas, such as your kidneys, adrenal glands, liver or thyroid. This finding may not be serious, but you may need more tests. Your doctor can help you decide what other tests you may need, if any.  What can I expect from the results?  About 1 out of 4 LDCT exams will find something that may need more tests. Most of the time, these findings are lung nodules. Lung nodules are very small collections of tissue in the lung. These nodules are very common, and the vast majority--more than 97 percent--are not cancer (benign). Most are normal lymph nodes or small areas of scarring from past infections.  But, if a small lung nodule is found to be cancer, the cancer can be cured more than 90 percent of the time. To know if the nodule is cancer, we may need to get more images before your next yearly screening exam. If the nodule has suspicious features (for example, it is large, has an odd shape or grows over time), we will refer you to a specialist for further testing.  Will my doctor also get the results?  Yes. Your doctor will get a copy of your results.  Is it okay to keep smoking now that there s a cancer screening exam?  No. Tobacco is one of the strongest cancer-causing agents. It causes not only lung cancer, but other cancers and cardiovascular (heart) diseases as well. The damage caused by smoking builds over time. This means that the longer you smoke, the higher your risk of disease. While it is never too late to quit, the sooner you quit, the better.  Where can I find help to quit smoking?  The best way to prevent lung cancer is to stop smoking. If you have already quit smoking, congratulations and keep it up! For help on quitting smoking, please call [employer] at 4-400-478-ZGZU (4272) or the American Cancer Society at 1-147.236.1612 to find local resources near you.  One-on-one health coaching:  If you d  prefer to work individually with a health care provider on tobacco cessation, we offer:      Medication Therapy Management:  Our specially trained pharmacists work closely with you and your doctor to help you quit smoking.  Call [phone] or [phone] (toll free).     Can Do: Health coaching offered by [facility].  [email or web address]

## 2021-09-27 NOTE — PROGRESS NOTES
"  Lung Cancer Screening Shared Decision Making Visit     Verena Lagunas is eligible for lung cancer screening on the basis of the information provided in my signed lung cancer screening order.     I have discussed with patient the risks and benefits of screening for lung cancer with low-dose CT.     The risks include:  radiation exposure: one low dose chest CT has as much ionizing radiation as about 15 chest x-rays or 6 months of background radiation living in [place]    false positives: 96% of positive findings/nodules are NOT cancer, but some might still require additional diagnostic evaluation, including biopsy  over-diagnosis: some slow growing cancers that might never have been clinically significant will be detected and treated unnecessarily     The benefit of early detection of lung cancer is contingent upon adherence to annual screening or more frequent follow up if indicated.     Furthermore, reaping the benefits of screening requires Verena Lagunas to be willing and physically able to undergo diagnostic procedures, if indicated. Although no specific guide is available for determining severity of comorbidities, it is reasonable to withhold screening in patients who have greater mortality risk from other diseases.     We did discuss that the only way to prevent lung cancer is to not smoke. Smoking cessation counseling was given, duration < 3 minutes.      I did offer risk estimation using a calculator such as this one:    ShouldIScreen        Answers for HPI/ROS submitted by the patient on 9/27/2021  In general, how would you rate your overall physical health?: fair  Frequency of exercise:: 6-7 days/week  Do you usually eat at least 4 servings of fruit and vegetables a day, include whole grains & fiber, and avoid regularly eating high fat or \"junk\" foods? : No  Taking medications regularly:: Yes  Medication side effects:: None  Activities of Daily Living: no assistance needed  Home safety: no safety " concerns identified  Hearing Impairment:: no hearing concerns  In the past 6 months, have you been bothered by leaking of urine?: No  In general, how would you rate your overall mental or emotional health?: good  Additional concerns today:: No  Duration of exercise:: 15-30 minutes  Barriers to taking medications:: Not applicable

## 2021-09-27 NOTE — PROGRESS NOTES
Office Visit - Physical   Verena Lagunas   66 year old  female    Date of visit: 9/27/2021  Physician: Tomasz Beltran MD, MD     Assessment and Plan   1. Annual physical exam  This is a six 6-year-old woman with issues as discussed below.  Numerous vaccinations offered and she declined    2. Screen for colon cancer  Has Cologuard at home    3. Need for hepatitis C screening test  - Hepatitis C Screen Reflex to HCV RNA Quant and Genotype; Future  - Hepatitis C Screen Reflex to HCV RNA Quant and Genotype    4. Personal history of tobacco use  Discussed smoking cessation  - Prof fee: Shared Decisionmaking for Lung Cancer Screening  - CT Chest Lung Cancer Scrn Low Dose wo; Future    5. Visit for screening mammogram  - MA SCREENING DIGITAL BILAT - Future  (s+30); Future    6. Mixed hyperlipidemia  Continue statin  - Lipid panel reflex to direct LDL Fasting; Future  - TSH with free T4 reflex; Future  - Lipid panel reflex to direct LDL Fasting  - TSH with free T4 reflex    7. Hypertension  Blood pressure okay continue same  - Comprehensive metabolic panel; Future  - CBC with platelets; Future  - Comprehensive metabolic panel  - CBC with platelets    8. Age-related osteoporosis without current pathological fracture  Patient not interested in treatment options    9. Vitamin D deficiency  Not currently taking vitamin D, recommended  - Vitamin D Deficiency; Future  - Vitamin D Deficiency    10. COPD  Smoking cessation advised, not really using inhalers    11. Lung nodules - annual screen due 6/2019  Needs follow-up CT    12. Gastroesophageal reflux disease without esophagitis  At this point not symptomatic and not using PPI    13. Vertigo  She is now back at work without issue    Return in about 53 weeks (around 10/3/2022) for Annual Wellness Visit.     Chief Complaint   Chief Complaint   Patient presents with     Physical     AWV        Patient Profile   Social History     Social History Narrative    [place]   Mynor.  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  No children.          Past Medical History   Patient Active Problem List   Diagnosis     Allergic Rhinitis     Mixed hyperlipidemia     Hypertension     Mammogram Right Breast Diffuse Calcification     Age-related osteoporosis without current pathological fracture     Vitamin D deficiency     Deaf     COPD     Lung nodules - annual screen due 6/2019     Gastroesophageal reflux disease without esophagitis     Vertigo     Cigarette nicotine dependence with nicotine-induced disorder       Past Surgical History  She has a past surgical history that includes Ovarian Cyst Removal (Right, 1970).     History of Present Illness   This 66 year old old woman comes in for annual physical.  Overall she is doing okay.  She does have a history of smoking and continues to smoke.  She is not all that interested in quitting.  She would like to do lung cancer screening.  She needs a mammogram.  She is on a statin for hyperlipidemia.  She has no chest pain.  Chronic stable shortness of breath.  History of osteoporosis and has declined treatment.  History of vitamin D deficiency not currently taking vitamin D.  Underlying COPD.  History of lung nodule seen on CT need 1 year follow-up.  Vertigo better    Review of Systems: A comprehensive review of systems was negative except as noted.     Medications and Allergies   Current Outpatient Medications   Medication Sig Dispense Refill     aspirin 81 MG EC tablet [ASPIRIN 81 MG EC TABLET] Take 81 mg by mouth daily.       atorvastatin (LIPITOR) 20 MG tablet [ATORVASTATIN (LIPITOR) 20 MG TABLET] Take 1 tablet (20 mg total) by mouth daily. 90 tablet 3     cholecalciferol, vitamin D3, (VITAMIN D3) 1,000 unit capsule [CHOLECALCIFEROL, VITAMIN D3, (VITAMIN D3) 1,000 UNIT CAPSULE] Take 1 capsule (1,000 Units total) by mouth  "daily.  0     diclofenac sodium (VOLTAREN) 1 % Gel [DICLOFENAC SODIUM (VOLTAREN) 1 % GEL] Apply 2 g topically 4 (four) times a day. 500 g 11     fluticasone propionate (FLONASE) 50 mcg/actuation nasal spray [FLUTICASONE PROPIONATE (FLONASE) 50 MCG/ACTUATION NASAL SPRAY] Shake liquid and use 1 spray in each nostril daily. 16 g 12     fluticasone propionate (FLONASE) 50 mcg/actuation nasal spray [FLUTICASONE PROPIONATE (FLONASE) 50 MCG/ACTUATION NASAL SPRAY] Shake liquid and use 1 spray in each nostril daily. 16 g 12     losartan-hydrochlorothiazide (HYZAAR) 50-12.5 MG tablet Take 1 tablet by mouth daily 90 tablet 2     Allergies   Allergen Reactions     Cat Hair Std Allergenic Ext [Cat Hair Extract] Unknown     Penicillins Shortness Of Breath        Family and Social History   Family History   Problem Relation Age of Onset     Breast Cancer Maternal Aunt 75.00     Colon Cancer Mother 79.00             Lung Cancer Father 50.00             Other - See Comments Brother         6 brothers and 1 sister     No Known Problems Sister         Social History     Tobacco Use     Smoking status: Current Every Day Smoker     Packs/day: 0.50     Types: Cigarettes     Smokeless tobacco: Never Used   Substance Use Topics     Alcohol use: No     Comment: Alcoholic Drinks/day: rare     Drug use: No        Physical Exam   General Appearance:   No acute distress    /76 (BP Location: Right arm, Patient Position: Sitting, Cuff Size: Adult Regular)   Pulse 88   Ht 1.651 m (5' 5\")   Wt 61.2 kg (134 lb 14.4 oz)   SpO2 99%   BMI 22.45 kg/m      EYES: Eyelids, conjunctiva, and sclera were normal. Pupils were normal. Cornea, iris, and lens were normal bilaterally.  HEAD, EARS, NOSE, MOUTH, AND THROAT: Head and face were normal. Hearing was normal to voice and the ears were normal to external exam. Nose appearance was normal and there was no discharge. Oropharynx was normal.  NECK: Neck appearance was normal. There " "were no neck masses and the thyroid was not enlarged.  RESPIRATORY: Breathing pattern was normal and the chest moved symmetrically.  Percussion/auscultatory percussion was normal.  Lung sounds were normal and there were no abnormal sounds.  CARDIOVASCULAR: Heart rate and rhythm were normal.  S1 and S2 were normal and there were no extra sounds or murmurs. Peripheral pulses in arms and legs were normal.  Jugular venous pressure was normal.  There was no peripheral edema.  GASTROINTESTINAL: The abdomen was normal in contour.  Bowel sounds were present.  Percussion detected no organ enlargement or tenderness.  Palpation detected no tenderness, mass, or enlarged organs.   MUSCULOSKELETAL: Skeletal configuration was normal and muscle mass was normal for age. Joint appearance was overall normal.  LYMPHATIC: There were no enlarged nodes.  SKIN/HAIR/NAILS: Skin color was normal.  There were no skin lesions.  Hair and nails were normal.  NEUROLOGIC: The patient was alert and oriented to person, place, time, and circumstance. Cranial nerves were normal. Motor strength was normal for age. The patient was normally coordinated.  PSYCHIATRIC:  Mood and affect were normal and the patient had normal recent and remote memory. The patient's judgment and insight were normal.       Additional Information        Tomasz Beltran MD, MD  Internal Medicine  Contact me at [phone]  Answers for HPI/ROS submitted by the patient on 9/27/2021  In general, how would you rate your overall physical health?: fair  Frequency of exercise:: 6-7 days/week  Do you usually eat at least 4 servings of fruit and vegetables a day, include whole grains & fiber, and avoid regularly eating high fat or \"junk\" foods? : No  Taking medications regularly:: Yes  Medication side effects:: None  Activities of Daily Living: no assistance needed  Home safety: no safety concerns identified  Hearing Impairment:: no hearing concerns  In the past 6 months, have you been " bothered by leaking of urine?: No  In general, how would you rate your overall mental or emotional health?: good  Additional concerns today:: No  Duration of exercise:: 15-30 minutes  Barriers to taking medications:: Not applicable

## 2021-09-28 ENCOUNTER — TELEPHONE (OUTPATIENT)
Dept: NURSING | Facility: CLINIC | Age: 66
End: 2021-09-28

## 2021-09-28 LAB
DEPRECATED CALCIDIOL+CALCIFEROL SERPL-MC: 43 UG/L (ref 30–80)
HCV AB SERPL QL IA: NONREACTIVE

## 2021-09-28 NOTE — TELEPHONE ENCOUNTER
Triage call:     Interpreted with  on the line.    Patient states that her  told her she received a video message from the clinic this morning and was calling back to inquire about what it was about.     There is no information in the chart of a documented phone call.  Instructed patient to call back after receiving the message at home and to call back with additional questions.     She verbalized understanding and agreement.     Julianne Moses RN   09/28/21 3:51 PM  [facility] Nurse Advisor

## 2021-10-11 ENCOUNTER — HEALTH MAINTENANCE LETTER (OUTPATIENT)
Age: 66
End: 2021-10-11

## 2021-10-13 ENCOUNTER — NURSE TRIAGE (OUTPATIENT)
Dept: NURSING | Facility: CLINIC | Age: 66
End: 2021-10-13

## 2021-10-13 NOTE — TELEPHONE ENCOUNTER
We are continuing to schedule all people age 12 and older for COVID-19 vaccines (patients age 12-17 can only use the Pfizer vaccine).     We are offering third doses of the Pfizer and Moderna COVID-19 vaccines to moderately and severely immunocompromised patients. Qualified patients can schedule their third dose vaccine appointment via [employer] or by walking in to one of our retail pharmacies to receive the vaccine - no appointment needed.      Beginning Thursday, Sept. 30, [facility] will begin offering booster doses of the Pfizer-Bio[employer]Aunal[employer] COVID-19 vaccine to the following:     People 65 years and older.     Residents in long-term care settings.     People ages 50 to 64 with certain underlying medical conditions (refer to CDC: People with Certain Medical Conditions).     People ages 18 to 49 who are at high risk for severe COVID-19 due to certain underlying medical conditions (refer to CDC: People with Certain Medical Conditions).      People ages 18 to 64 who are at increased risk for COVID-19 exposure and transmission because of where they live or work.      To schedule your 1st dose appointment, please log in to [employer] using this link to see when and where we have openings.      To schedule your additional dose appointment for immunocompromised patients or patients that qualify for boosters, please log in to [employer] using this link to see when and where we have openings.     If you have technical difficulty using [employer], call [phone], option 1 for assistance.     More information about vaccine effectiveness at reducing spread of disease, hospitalizations, and death as well as vaccine safety and answers to other questions can be found on our website: [email or web address].

## 2021-10-18 ENCOUNTER — HOSPITAL ENCOUNTER (OUTPATIENT)
Dept: CT IMAGING | Facility: HOSPITAL | Age: 66
Discharge: HOME | End: 2021-10-18
Attending: INTERNAL MEDICINE | Admitting: INTERNAL MEDICINE
Payer: COMMERCIAL

## 2021-10-18 DIAGNOSIS — Z87.891 PERSONAL HISTORY OF TOBACCO USE: ICD-10-CM

## 2021-10-18 PROCEDURE — 71271 CT THORAX LUNG CANCER SCR C-: CPT

## 2021-10-19 ENCOUNTER — IMMUNIZATION (OUTPATIENT)
Dept: NURSING | Facility: CLINIC | Age: 66
End: 2021-10-19
Payer: COMMERCIAL

## 2021-10-19 PROCEDURE — 0004A PR COVID VAC PFIZER DIL RECON 30 MCG/0.3 ML IM: CPT

## 2021-10-19 PROCEDURE — 91300 PR COVID VAC PFIZER DIL RECON 30 MCG/0.3 ML IM: CPT

## 2021-11-18 DIAGNOSIS — E78.2 MIXED HYPERLIPIDEMIA: ICD-10-CM

## 2021-11-18 RX ORDER — ATORVASTATIN CALCIUM 20 MG/1
20 TABLET, FILM COATED ORAL DAILY
Qty: 90 TABLET | Refills: 3 | Status: SHIPPED | OUTPATIENT
Start: 2021-11-18 | End: 2022-11-09

## 2021-11-18 NOTE — TELEPHONE ENCOUNTER
"Routing refill request to provider for review/approval because:  A break in medication    Last Written Prescription Date:  9/22/2020  Last Fill Quantity: 90,  # refills: 3   Last office visit provider:  9/27/2021     Requested Prescriptions   Pending Prescriptions Disp Refills     atorvastatin (LIPITOR) 20 MG tablet 90 tablet 3     Sig: Take 1 tablet (20 mg) by mouth daily       Statins Protocol Passed - 11/18/2021  4:05 PM        Passed - LDL on file in past 12 months     Recent Labs   Lab Test 09/27/21  1508   LDL 65             Passed - No abnormal creatine kinase in past 12 months     No lab results found.             Passed - Recent (12 mo) or future (30 days) visit within the authorizing provider's specialty     Patient has had an office visit with the authorizing provider or a provider within the authorizing providers department within the previous 12 mos or has a future within next 30 days. See \"Patient Info\" tab in inbasket, or \"Choose Columns\" in Meds & Orders section of the refill encounter.              Passed - Medication is active on med list        Passed - Patient is age 18 or older        Passed - No active pregnancy on record        Passed - No positive pregnancy test in past 12 months             Tamia Quijano RN 11/18/21 4:13 PM  "

## 2021-11-18 NOTE — TELEPHONE ENCOUNTER
Pending Prescriptions:                       Disp   Refills    atorvastatin (LIPITOR) 20 MG tablet       90 tab*3            Sig: Take 1 tablet (20 mg) by mouth daily    Last lipid done 9/27/21 with normal result.

## 2021-11-18 NOTE — TELEPHONE ENCOUNTER
Reason for Call:  Other call back    Detailed comments: pt is very low on medication for:  Atorvastatin    [facility]      Pt is deaf - best use My Chart     Phone Number Patient can be reached at: Home number on file [phone] (home)    Best Time: anytime    Can we leave a detailed message on this number? YES    Call taken on 11/18/2021 at 1:48 PM by Syeda Melendez

## 2022-06-02 DIAGNOSIS — I10 ESSENTIAL HYPERTENSION: ICD-10-CM

## 2022-06-02 NOTE — TELEPHONE ENCOUNTER
Refill Request  Medication name: Pending Prescriptions:                       Disp   Refills    losartan-hydrochlorothiazide (HYZAAR) 50-*90 tab*2            Sig: Take 1 tablet by mouth daily    Who prescribed the medication: PCp  Last refill on medication: 9/10/21  [facility]  Last appointment with PCP: 9/27/21  Next appointment: unknown

## 2022-06-03 RX ORDER — LOSARTAN POTASSIUM AND HYDROCHLOROTHIAZIDE 12.5; 5 MG/1; MG/1
1 TABLET ORAL DAILY
Qty: 90 TABLET | Refills: 0 | Status: SHIPPED | OUTPATIENT
Start: 2022-06-03 | End: 2023-01-18

## 2022-06-04 NOTE — TELEPHONE ENCOUNTER
"Last Written Prescription Date:  9/10/21  Last Fill Quantity: 90,  # refills: 2   Last office visit provider:  9/27/21     Requested Prescriptions   Pending Prescriptions Disp Refills     losartan-hydrochlorothiazide (HYZAAR) 50-12.5 MG tablet 90 tablet 2     Sig: Take 1 tablet by mouth daily       Angiotensin-II Receptors Passed - 6/2/2022  9:40 AM        Passed - Last blood pressure under 140/90 in past 12 months     BP Readings from Last 3 Encounters:   09/27/21 112/76   03/22/21 120/72   02/05/21 112/74                 Passed - Recent (12 mo) or future (30 days) visit within the authorizing provider's specialty     Patient has had an office visit with the authorizing provider or a provider within the authorizing providers department within the previous 12 mos or has a future within next 30 days. See \"Patient Info\" tab in inbasket, or \"Choose Columns\" in Meds & Orders section of the refill encounter.              Passed - Medication is active on med list        Passed - Patient is age 18 or older        Passed - No active pregnancy on record        Passed - Normal serum creatinine on file in past 12 months     Recent Labs   Lab Test 09/27/21  1508   CR 0.74       Ok to refill medication if creatinine is low          Passed - Normal serum potassium on file in past 12 months     Recent Labs   Lab Test 09/27/21  1508   POTASSIUM 4.2                    Passed - No positive pregnancy test in past 12 months       Diuretics (Including Combos) Protocol Passed - 6/2/2022  9:40 AM        Passed - Blood pressure under 140/90 in past 12 months     BP Readings from Last 3 Encounters:   09/27/21 112/76   03/22/21 120/72   02/05/21 112/74                 Passed - Recent (12 mo) or future (30 days) visit within the authorizing provider's specialty     Patient has had an office visit with the authorizing provider or a provider within the authorizing providers department within the previous 12 mos or has a future within next 30 " "days. See \"Patient Info\" tab in inbasket, or \"Choose Columns\" in Meds & Orders section of the refill encounter.              Passed - Medication is active on med list        Passed - Patient is age 18 or older        Passed - No active pregancy on record        Passed - Normal serum creatinine on file in past 12 months     Recent Labs   Lab Test 09/27/21  1508   CR 0.74              Passed - Normal serum potassium on file in past 12 months     Recent Labs   Lab Test 09/27/21  1508   POTASSIUM 4.2                    Passed - Normal serum sodium on file in past 12 months     Recent Labs   Lab Test 09/27/21  1508                 Passed - No positive pregnancy test in past 12 months             B[place]ide, Lauren, RN 06/03/22 7:08 PM  "

## 2022-08-24 NOTE — TELEPHONE ENCOUNTER
1235: Attempted to contact [facility] x2 to follow up and see this message is appropriately routed to provider, no answer. Will continue to follow up as needed.   [facility]  Hospitalist Progress Note  Jules Welch MD  08/24/2022    Assessment & Plan   Nathan Macias is a 88 year old male with a history of afib on Eliquis who was brought to the ED with dysarthria, aphasia and ataxia and noted to have an acute ischemic stroke of the RIGHT cerebellum. He is admitted for further evaluation and management.      Acute Ischemic stroke of RIGHT cerebellum  - TTE with normal EF, indeterminate diastolic function and neg bubble.  - Expressive aphasia (persistent), not a candidate for stroke intervention.  - Etiology of stroke is likely large artery atherosclerosis and R vertebral artery stenosis, artery to artery embolism. No evidence of malignancy on screen.   - Exam per neuro;  dysarthria, decreased JAGDISH in RUE and RLE, ataxia with finger to nose and heel to shin with RUE and RLE. normal on the left  - dysarthria and ataxia with very slow improvement, Strength intact. Taking po.   - sitter since fall on 8/6 but has been without sitter for a number of days, no restraints  - head ct post fall stable.   - Continue PTA statin. Cont PtA dose as LDL 40   - Cont PTA BP meds, eliquis  - Secondary stroke prevention with apixaban + ASA 81 mg daily x90 days, then just apixaban after that.   - PT/OT/SLP recommending TCU  - Long term goals < 130/80, LDL goal <70, A1c goal <7  - Follow up with general neurology in 6-8 weeks   - repeat CTH non contrast showed continued evolution of right cerebellar infarction  - making clinical improvement with improved aphasia and less delirium and agitation.    Delirium  Probable underlyng cognitive issues.  Wife reports that the patient has been having issues with short-term memories for about a year (forgetting keys and groceries etc).  Likely PTA cognitive impairment  -some agitation several days ago. Needed sitter started.   -needed prn zyprex aon 8/2 and 8/3.   Impulsive. Pulled out iv, pulled at catheter.   8/4 No  agitation, cooperative. Remains impulsive at times.   ucx ngtd  Cefuroxime for possible uti- stopped given ucx ngt   -add qpm meletonin  - Delirium bundle, decrease frequency of vitals and telemetry  - Feliz as below for retention.  - Formal cognitive testing as outpatient.  - had fall night on 8/17   - zyprexa discontinued and continued on seroquel at bedtime and prn.      Unwitnessed fall 8/6 and 8/17  No neuro changes. No change in status prefall  Bed alarm not on or not working  Pt is ataxic from stroke  No injury. Hb stable  Bun.cr elevated, given fluid bolus and maint fluids will SL  -Cr better with fluids  Had fall night of 8/17, seen by house ashleigh, labs stable, CTH showed nothing acute    HERNAN.   prerenal  Hb stable.   Held ace inh and hydrochlorothiazide  Cr at baseline   Discontinue hydrochlorothiazide in the future     Atrial fibrillation  - continue BB and apixaban.     DMII. A1C is 7.1.   range today range.   - hold metformin while inpatient  - sliding scale insulin  - added prandial aspart --> increased to 1 unit per 10 gram carbs 8/6, adjust as needed.      Hypertension  Good control  - discontinued hydrochlorothiazide   - continue lisinopril 20 mg daily  - BP stable on metoprolol 25 mg daily.    Cough  Sore throat:  No infection seen on CT chest.Neg for strep AG.  Afebrile.   - Cepacol. Prn.  - Prn antitussives.  - IS.    Prostatomegaly on CT (unsure if new but was on flomax PTA).  Urinary retention causing frequent cath.  Feliz traums 8/15  Hx of tx for bph and LUTS for 2 years with flomax  UA 8/3: 37 wbc, 176 rbc  - Indewelling feliz.  - Continue flomax.  - Bladder scan given confusion.  - Urology consult. Seen 8/3, rec feliz and consider voiding trial in several days. Cont flomax. CT scan reviewed and agree with radiology interpretation . No indication for urgent urologic intervention. Urology signed off  - follow up ucx shows no growth  - consider voiding trial in 2-3 days, if discharge with  "feliz outpatient urology follow up  [facility] urology or at [facility]rology  - pulled at catheter. Some blood in feliz. No clots. RN to flush, follow closely.   -8/5, still with bloody urine, ucx ngtd.    - 8/10 UA abnormal, UCx negative, discontinue empiric iv ceftriaxone, keep penile tip around feliz catheter clean.  - patient noted to have coke color urine, ua + hematuria, now clearing  - penile discharge again noted, UA abnormal 8/19 and started on ceftriaxone pending urine cultures NGTD  - discontinued feliz   - needing intermittent straight cath    Pulmonary nodule: 3mm, accidental.  - Repeat CT in 12 months.  - pcp follow up      DVT Prophylaxis: apixaban.    Code Status: Full Code     Disposition:   -- TCU on 8/24 or whenever bed is available.    Interval History   -- speech more spontaneous although dysarthric  -- discussed with social work    -Data reviewed today: I reviewed all new labs and imaging over the last 24 hours. I personally reviewed no images or EKG's today.    Physical Exam    , Blood pressure 112/56, pulse 72, temperature 96.9  F (36.1  C), temperature source Axillary, resp. rate 16, height 1.715 m (5' 7.5\"), weight 84 kg (185 lb 3 oz), SpO2 97 %.  Vitals:    08/01/22 0944   Weight: 84 kg (185 lb 3 oz)     Vital Signs with Ranges  Temp:  [96.9  F (36.1  C)-97.1  F (36.2  C)] 96.9  F (36.1  C)  Pulse:  [64-90] 72  Resp:  [16-18] 16  BP: (112-153)/(56-98) 112/56  SpO2:  [95 %-97 %] 97 %  I/O's Last 24 hours  I/O last 3 completed shifts:  In: -   Out: 650 [Urine:650]    Constitutional:  More alert, less dysarthric, follows commands  Respiratory: Clear to auscultation bilaterally, no crackles or wheezing  Cardiovascular: Regular rate and rhythm, normal S1 and S2   GI: Normal bowel sounds, soft, non-distended, non-tender  Skin/Integumen: No rashes, no cyanosis, no edema  Other: Bilateral UE ataxia,      Medications   All medications were reviewed.    - MEDICATION INSTRUCTIONS -       - MEDICATION " INSTRUCTIONS -       - MEDICATION INSTRUCTIONS -         apixaban ANTICOAGULANT  5 mg Oral BID     aspirin  81 mg Oral Daily     atorvastatin  10 mg Oral or Feeding Tube QPM     cefTRIAXone  1 g Intravenous Q24H     [Held by provider] hydrochlorothiazide  25 mg Oral Daily     insulin aspart   Subcutaneous TID w/meals     insulin aspart  1-3 Units Subcutaneous TID AC     insulin aspart  1-3 Units Subcutaneous At Bedtime     lisinopril  20 mg Oral Daily     metoprolol succinate ER  25 mg Oral Daily     QUEtiapine  50 mg Oral At Bedtime     sodium chloride (PF)  3 mL Intracatheter Q8H     tamsulosin  0.4 mg Oral Daily        Data   Recent Labs   Lab 08/24/22  1217 08/24/22  0754 08/24/22  0201 08/21/22  1227 08/21/22  0902 08/18/22  0841 08/18/22  0218   WBC  --   --   --   --  7.0  --  9.0   HGB  --   --   --   --  12.8*  --  13.1*   MCV  --   --   --   --  94  --  94   PLT  --   --   --   --  232  --  226   NA  --   --   --   --  141  --  140   POTASSIUM  --   --   --   --  4.7  --  4.7   CHLORIDE  --   --   --   --  110*  --  109   CO2  --   --   --   --  26  --  25   BUN  --   --   --   --  67*  --  66*   CR  --   --   --   --  1.11  --  1.42*   ANIONGAP  --   --   --   --  5  --  6   MAURICIO  --   --   --   --  8.4*  --  8.7   * 188* 220*   < > 171*   < > 180*    < > = values in this interval not displayed.       No results found for this or any previous visit (from the past 24 hour(s)).    Jules Welch MD  Text Page  (7am to 6pm)

## 2022-09-17 ENCOUNTER — OFFICE VISIT (OUTPATIENT)
Dept: FAMILY MEDICINE | Facility: CLINIC | Age: 67
End: 2022-09-17
Payer: COMMERCIAL

## 2022-09-17 VITALS
RESPIRATION RATE: 18 BRPM | SYSTOLIC BLOOD PRESSURE: 139 MMHG | TEMPERATURE: 98.4 F | DIASTOLIC BLOOD PRESSURE: 82 MMHG | WEIGHT: 129.4 LBS | BODY MASS INDEX: 21.53 KG/M2 | OXYGEN SATURATION: 98 % | HEART RATE: 79 BPM

## 2022-09-17 DIAGNOSIS — K92.1 BLOOD IN STOOL: ICD-10-CM

## 2022-09-17 DIAGNOSIS — R19.7 DIARRHEA, UNSPECIFIED TYPE: Primary | ICD-10-CM

## 2022-09-17 LAB
ANION GAP SERPL CALCULATED.3IONS-SCNC: 9 MMOL/L (ref 5–18)
BUN SERPL-MCNC: 11 MG/DL (ref 8–22)
CALCIUM SERPL-MCNC: 9.2 MG/DL (ref 8.5–10.5)
CHLORIDE BLD-SCNC: 100 MMOL/L (ref 98–107)
CO2 SERPL-SCNC: 28 MMOL/L (ref 22–31)
CREAT SERPL-MCNC: 0.71 MG/DL (ref 0.6–1.1)
ERYTHROCYTE [DISTWIDTH] IN BLOOD BY AUTOMATED COUNT: 12.1 % (ref 10–15)
GFR SERPL CREATININE-BSD FRML MDRD: >90 ML/MIN/1.73M2
GLUCOSE BLD-MCNC: 87 MG/DL (ref 70–125)
HCT VFR BLD AUTO: 40.4 % (ref 35–47)
HGB BLD-MCNC: 13.4 G/DL (ref 11.7–15.7)
HOLD SPECIMEN: NORMAL
MCH RBC QN AUTO: 28.6 PG (ref 26.5–33)
MCHC RBC AUTO-ENTMCNC: 33.2 G/DL (ref 31.5–36.5)
MCV RBC AUTO: 86 FL (ref 78–100)
PLATELET # BLD AUTO: 291 10E3/UL (ref 150–450)
POTASSIUM BLD-SCNC: 3.4 MMOL/L (ref 3.5–5)
RBC # BLD AUTO: 4.69 10E6/UL (ref 3.8–5.2)
SODIUM SERPL-SCNC: 137 MMOL/L (ref 136–145)
WBC # BLD AUTO: 12.3 10E3/UL (ref 4–11)

## 2022-09-17 PROCEDURE — 85027 COMPLETE CBC AUTOMATED: CPT | Performed by: NURSE PRACTITIONER

## 2022-09-17 PROCEDURE — 80048 BASIC METABOLIC PNL TOTAL CA: CPT | Performed by: NURSE PRACTITIONER

## 2022-09-17 PROCEDURE — 99214 OFFICE O/P EST MOD 30 MIN: CPT | Performed by: NURSE PRACTITIONER

## 2022-09-17 PROCEDURE — 36415 COLL VENOUS BLD VENIPUNCTURE: CPT | Performed by: NURSE PRACTITIONER

## 2022-09-17 NOTE — PROGRESS NOTES
SUBJECTIVE:  Verena Lagunas is a 67 year old female who presents to the clinic today for possible blood in stool     Past Medical History:   Diagnosis Date     Allergic rhinitis      Hypertension      Tobacco abuse         Current Outpatient Medications   Medication Sig Dispense Refill     aspirin 81 MG EC tablet [ASPIRIN 81 MG EC TABLET] Take 81 mg by mouth daily.       atorvastatin (LIPITOR) 20 MG tablet Take 1 tablet (20 mg) by mouth daily 90 tablet 3     cholecalciferol, vitamin D3, (VITAMIN D3) 1,000 unit capsule [CHOLECALCIFEROL, VITAMIN D3, (VITAMIN D3) 1,000 UNIT CAPSULE] Take 1 capsule (1,000 Units total) by mouth daily.  0     diclofenac sodium (VOLTAREN) 1 % Gel [DICLOFENAC SODIUM (VOLTAREN) 1 % GEL] Apply 2 g topically 4 (four) times a day. 500 g 11     fluticasone propionate (FLONASE) 50 mcg/actuation nasal spray [FLUTICASONE PROPIONATE (FLONASE) 50 MCG/ACTUATION NASAL SPRAY] Shake liquid and use 1 spray in each nostril daily. 16 g 12     losartan-hydrochlorothiazide (HYZAAR) 50-12.5 MG tablet Take 1 tablet by mouth daily 90 tablet 0       REVIEW OF SYSTEMS  ROS:   Review of systems negative except as stated above.    OBJECTIVE:  /82 (BP Location: Left arm, Patient Position: Sitting, Cuff Size: Adult Regular)   Pulse 79   Temp 98.4  F (36.9  C) (Tympanic)   Resp 18   Wt 58.7 kg (129 lb 6.4 oz)   SpO2 98%   BMI 21.53 kg/m     General appearance: alert,no apparent distress  HEENT: conjunctivae not injected. PERRL, EOM's intact.    Ears: External ears normal. Canals clear. TM's normal with erythema or effusion bilaterlly   RESP: Normal - CTA without rales, rhonchi, or wheezing.  ABDOMEN: Abdomen soft, non-tender. BS normal. No masses, organomegaly  Rectal: No hemorrhoids noted  SKIN: Skin color, texture, turgor normal. No rashes or lesions.       ASSESSMENT:  (R19.7) Diarrhea, unspecified type  (primary encounter diagnosis)  Comment: Patient has noticed mild diarrhea over the last couple of  days and today she noted some small amount of blood in the stool no fevers no abdominal pain.  Obtain labs recommend stool cultures.  Mildly elevated WBCs no fever will need to await stool cultures.  Plan is patient to obtain stool cultures continue to monitor closely if stool cultures negative patient can start Imodium and should follow-up with her primary care provider as she may need a colonoscopy.  If stool cultures come back positive we will treat the underlying cause.  Did review at length with patient she can attempt some butt paste for her bottom as it is mildly irritated but not a yeast infection.  Also reviewed warning signs and when she should be seen again prior to her follow-up with her primary care provider and while we await stool cultures  Plan: Clostridium difficile Toxin B PCR, Enteric         Bacteria and Virus Panel by [facility] Stool, Basic         metabolic panel  (Ca, Cl, CO2, Creat, Gluc, K,         Na, BUN), CANCELED: Basic metabolic panel  (Ca,        Cl, CO2, Creat, Gluc, K, Na, BUN)      (K92.1) Blood in stool  Comment: see above   Plan: Basic metabolic panel  (Ca, Cl, CO2, Creat,         Gluc, K, Na, BUN), CBC with platelets,         CANCELED: CBC with platelets, CANCELED: Basic         metabolic panel  (Ca, Cl, CO2, Creat, Gluc, K,         Na, BUN)          PLAN:  Await stool cultures and patient will need to follow-up with her primary care provider has an appointment on October 4    SARAH Be CNP

## 2022-09-19 LAB — C DIFF TOX B STL QL: POSITIVE

## 2022-09-19 PROCEDURE — 87493 C DIFF AMPLIFIED PROBE: CPT | Mod: 59 | Performed by: NURSE PRACTITIONER

## 2022-09-19 PROCEDURE — 87506 IADNA-DNA/RNA PROBE TQ 6-11: CPT | Performed by: NURSE PRACTITIONER

## 2022-09-20 ENCOUNTER — TELEPHONE (OUTPATIENT)
Dept: URGENT CARE | Facility: URGENT CARE | Age: 67
End: 2022-09-20

## 2022-09-20 DIAGNOSIS — A04.71 RECURRENT CLOSTRIDIOIDES DIFFICILE DIARRHEA: Primary | ICD-10-CM

## 2022-09-20 RX ORDER — VANCOMYCIN HYDROCHLORIDE 125 MG/1
CAPSULE ORAL
Qty: 14 CAPSULE | Refills: 0 | Status: SHIPPED | OUTPATIENT
Start: 2022-10-20 | End: 2022-11-01

## 2022-09-20 RX ORDER — VANCOMYCIN HYDROCHLORIDE 125 MG/1
CAPSULE ORAL
Qty: 77 CAPSULE | Refills: 0 | Status: SHIPPED | OUTPATIENT
Start: 2022-09-20 | End: 2022-10-18

## 2022-09-25 ENCOUNTER — HEALTH MAINTENANCE LETTER (OUTPATIENT)
Age: 67
End: 2022-09-25

## 2022-10-04 ENCOUNTER — OFFICE VISIT (OUTPATIENT)
Dept: INTERNAL MEDICINE | Facility: CLINIC | Age: 67
End: 2022-10-04
Payer: COMMERCIAL

## 2022-10-04 VITALS
RESPIRATION RATE: 14 BRPM | DIASTOLIC BLOOD PRESSURE: 77 MMHG | BODY MASS INDEX: 22.11 KG/M2 | SYSTOLIC BLOOD PRESSURE: 165 MMHG | OXYGEN SATURATION: 99 % | WEIGHT: 132.7 LBS | HEART RATE: 92 BPM | HEIGHT: 65 IN

## 2022-10-04 DIAGNOSIS — J41.0 SIMPLE CHRONIC BRONCHITIS (H): ICD-10-CM

## 2022-10-04 DIAGNOSIS — Z12.31 VISIT FOR SCREENING MAMMOGRAM: ICD-10-CM

## 2022-10-04 DIAGNOSIS — R91.8 LUNG NODULES: ICD-10-CM

## 2022-10-04 DIAGNOSIS — E78.2 MIXED HYPERLIPIDEMIA: ICD-10-CM

## 2022-10-04 DIAGNOSIS — Z86.19 HISTORY OF CLOSTRIDIOIDES DIFFICILE COLITIS: ICD-10-CM

## 2022-10-04 DIAGNOSIS — I10 ESSENTIAL HYPERTENSION: ICD-10-CM

## 2022-10-04 DIAGNOSIS — K21.9 GASTROESOPHAGEAL REFLUX DISEASE WITHOUT ESOPHAGITIS: ICD-10-CM

## 2022-10-04 DIAGNOSIS — Z12.11 SCREEN FOR COLON CANCER: ICD-10-CM

## 2022-10-04 DIAGNOSIS — Z00.00 ANNUAL PHYSICAL EXAM: Primary | ICD-10-CM

## 2022-10-04 DIAGNOSIS — H91.93 BILATERAL DEAFNESS: ICD-10-CM

## 2022-10-04 DIAGNOSIS — Z87.891 PERSONAL HISTORY OF TOBACCO USE: ICD-10-CM

## 2022-10-04 DIAGNOSIS — M81.0 AGE-RELATED OSTEOPOROSIS WITHOUT CURRENT PATHOLOGICAL FRACTURE: ICD-10-CM

## 2022-10-04 LAB
ALBUMIN UR-MCNC: NEGATIVE MG/DL
ANION GAP SERPL CALCULATED.3IONS-SCNC: 9 MMOL/L (ref 7–15)
APPEARANCE UR: CLEAR
BACTERIA #/AREA URNS HPF: ABNORMAL /HPF
BILIRUB UR QL STRIP: NEGATIVE
BUN SERPL-MCNC: 12.3 MG/DL (ref 8–23)
CALCIUM SERPL-MCNC: 9.5 MG/DL (ref 8.8–10.2)
CHLORIDE SERPL-SCNC: 101 MMOL/L (ref 98–107)
CHOLEST SERPL-MCNC: 171 MG/DL
COLOR UR AUTO: YELLOW
CREAT SERPL-MCNC: 0.71 MG/DL (ref 0.51–0.95)
DEPRECATED HCO3 PLAS-SCNC: 27 MMOL/L (ref 22–29)
ERYTHROCYTE [DISTWIDTH] IN BLOOD BY AUTOMATED COUNT: 12.5 % (ref 10–15)
GFR SERPL CREATININE-BSD FRML MDRD: >90 ML/MIN/1.73M2
GLUCOSE SERPL-MCNC: 71 MG/DL (ref 70–99)
GLUCOSE UR STRIP-MCNC: NEGATIVE MG/DL
HCT VFR BLD AUTO: 39.8 % (ref 35–47)
HDLC SERPL-MCNC: 81 MG/DL
HGB BLD-MCNC: 12.8 G/DL (ref 11.7–15.7)
HGB UR QL STRIP: ABNORMAL
KETONES UR STRIP-MCNC: NEGATIVE MG/DL
LDLC SERPL CALC-MCNC: 77 MG/DL
LEUKOCYTE ESTERASE UR QL STRIP: ABNORMAL
MCH RBC QN AUTO: 28.3 PG (ref 26.5–33)
MCHC RBC AUTO-ENTMCNC: 32.2 G/DL (ref 31.5–36.5)
MCV RBC AUTO: 88 FL (ref 78–100)
NITRATE UR QL: NEGATIVE
NONHDLC SERPL-MCNC: 90 MG/DL
PH UR STRIP: 7 [PH] (ref 5–8)
PLATELET # BLD AUTO: 315 10E3/UL (ref 150–450)
POTASSIUM SERPL-SCNC: 4.4 MMOL/L (ref 3.4–5.3)
RBC # BLD AUTO: 4.52 10E6/UL (ref 3.8–5.2)
RBC #/AREA URNS AUTO: ABNORMAL /HPF
SODIUM SERPL-SCNC: 137 MMOL/L (ref 136–145)
SP GR UR STRIP: 1.01 (ref 1–1.03)
SQUAMOUS #/AREA URNS AUTO: ABNORMAL /LPF
TRIGL SERPL-MCNC: 65 MG/DL
UROBILINOGEN UR STRIP-ACNC: 0.2 E.U./DL
WBC # BLD AUTO: 9.4 10E3/UL (ref 4–11)
WBC #/AREA URNS AUTO: ABNORMAL /HPF

## 2022-10-04 PROCEDURE — 99214 OFFICE O/P EST MOD 30 MIN: CPT | Mod: 25 | Performed by: INTERNAL MEDICINE

## 2022-10-04 PROCEDURE — 80048 BASIC METABOLIC PNL TOTAL CA: CPT | Performed by: INTERNAL MEDICINE

## 2022-10-04 PROCEDURE — 99397 PER PM REEVAL EST PAT 65+ YR: CPT | Performed by: INTERNAL MEDICINE

## 2022-10-04 PROCEDURE — 81001 URINALYSIS AUTO W/SCOPE: CPT | Performed by: INTERNAL MEDICINE

## 2022-10-04 PROCEDURE — 80061 LIPID PANEL: CPT | Performed by: INTERNAL MEDICINE

## 2022-10-04 PROCEDURE — 85027 COMPLETE CBC AUTOMATED: CPT | Performed by: INTERNAL MEDICINE

## 2022-10-04 PROCEDURE — 36415 COLL VENOUS BLD VENIPUNCTURE: CPT | Performed by: INTERNAL MEDICINE

## 2022-10-04 RX ORDER — OLMESARTAN MEDOXOMIL AND HYDROCHLOROTHIAZIDE 40/12.5 40; 12.5 MG/1; MG/1
1 TABLET ORAL DAILY
Qty: 90 TABLET | Refills: 4 | Status: SHIPPED | OUTPATIENT
Start: 2022-10-04 | End: 2023-01-18

## 2022-10-04 ASSESSMENT — ENCOUNTER SYMPTOMS
BREAST MASS: 0
ABDOMINAL PAIN: 0
HEMATURIA: 0
DIARRHEA: 0
FREQUENCY: 1
HEADACHES: 0
PALPITATIONS: 0
MYALGIAS: 1
HEARTBURN: 1
PARESTHESIAS: 1
DIZZINESS: 1
CHILLS: 0
EYE PAIN: 1
NAUSEA: 0
HEMATOCHEZIA: 1
FEVER: 0
JOINT SWELLING: 0
CONSTIPATION: 0
NERVOUS/ANXIOUS: 0
SHORTNESS OF BREATH: 1
SORE THROAT: 0
DYSURIA: 0
ARTHRALGIAS: 0
COUGH: 1
WEAKNESS: 0

## 2022-10-04 ASSESSMENT — PAIN SCALES - GENERAL: PAINLEVEL: NO PAIN (0)

## 2022-10-04 ASSESSMENT — ACTIVITIES OF DAILY LIVING (ADL): CURRENT_FUNCTION: NO ASSISTANCE NEEDED

## 2022-10-04 NOTE — PROGRESS NOTES
"SUBJECTIVE:   Verena is a 67 year old who presents for Preventive Visit.  This 67-year-old woman comes in for annual wellness visit.  She has had a little bit of a difficult summer.  She had some dental work done and was on antibiotics and then developed C. difficile.  She is on vancomycin and improving.  Her brother unfortunately  it sounds like he had cancer.  She continues to smoke.  She needs a lung cancer screening test.  She has not been taking her blood pressure medication.  She does not want to take blood pressure medications.  She has not been taking her statin.  Reflux symptoms stable.    Patient has been advised of split billing requirements and indicates understanding: Yes  Are you in the first 12 months of your Medicare coverage?  No    Healthy Habits:     In general, how would you rate your overall health?  Fair    Frequency of exercise:  6-7 days/week    Duration of exercise:  15-30 minutes    Do you usually eat at least 4 servings of fruit and vegetables a day, include whole grains    & fiber and avoid regularly eating high fat or \"junk\" foods?  Yes    Medication side effects:  Other    Ability to successfully perform activities of daily living:  No assistance needed    Home Safety:  Lack of grab bars in the bathroom    Hearing Impairment:  No hearing concerns    In the past 6 months, have you been bothered by leaking of urine?  No    In general, how would you rate your overall mental or emotional health?  Fair      PHQ-2 Total Score: 1    Do you feel safe in your environment? Yes    Have you ever done Advance Care Planning? (For example, a Health Directive, POLST, or a discussion with a medical provider or your loved ones about your wishes): Yes, advance care planning is on file.       Fall risk  Fallen 2 or more times in the past year?: No  Any fall with injury in the past year?: No    Cognitive Screening   1) Repeat 3 items (Leader, Season, Table)    2) Clock draw: NORMAL  3) 3 item recall: " Recalls 3 objects  Results: 3 items recalled: COGNITIVE IMPAIRMENT LESS LIKELY    Mini-CogTM Copyright BENJAMIN Tao. Licensed by the author for use in [facility]; reprinted with permission (bianka@[facility]). All rights reserved.      Do you have sleep apnea, excessive snoring or daytime drowsiness?: no    Reviewed and updated as needed this visit by clinical staff   Tobacco  Allergies  Meds   Med Hx  Surg Hx  Fam Hx  Soc Hx          Reviewed and updated as needed this visit by Provider                   Social History     Tobacco Use     Smoking status: Current Every Day Smoker     Packs/day: 0.50     Types: Cigarettes     Smokeless tobacco: Never Used   Substance Use Topics     Alcohol use: No     Comment: Alcoholic Drinks/day: rare         Alcohol Use 10/4/2022   Prescreen: >3 drinks/day or >7 drinks/week? No         Current providers sharing in care for this patient include:   Patient Care Team:  Tomasz Beltran MD as PCP - General (Internal Medicine)  Tomasz Beltran MD as Assigned PCP    The following health maintenance items are reviewed in Epic and correct as of today:  Health Maintenance   Topic Date Due     COLORECTAL CANCER SCREENING  Never done     ZOSTER IMMUNIZATION (1 of 2) Never done     MAMMO SCREENING  08/26/2020     DTAP/TDAP/TD IMMUNIZATION (2 - Td or Tdap) 06/01/2021     Pneumococcal Vaccine: 65+ Years (2 - PCV) 09/22/2021     COVID-19 Vaccine (4 - Booster for Pfizer series) 12/14/2021     INFLUENZA VACCINE (1) 09/01/2022     ANNUAL REVIEW OF HM ORDERS  09/27/2022     NICOTINE/TOBACCO CESSATION COUNSELING Q 1 YR  09/27/2022     MEDICARE ANNUAL WELLNESS VISIT  09/27/2022     LUNG CANCER SCREENING  10/18/2022     FALL RISK ASSESSMENT  10/04/2023     ADVANCE CARE PLANNING  09/03/2024     LIPID  09/27/2026     DEXA  06/19/2033     SPIROMETRY  Completed     HEPATITIS C SCREENING  Completed     PHQ-2 (once per calendar year)  Completed     COPD ACTION PLAN  Addressed     IPV IMMUNIZATION   "Aged Out     MENINGITIS IMMUNIZATION  Aged Out     HEPATITIS B IMMUNIZATION  Aged Out       FHS-7:   Breast CA Risk Assessment (FHS-7) 10/2/2022 10/4/2022   Did any of your first-degree relatives have breast or ovarian cancer? Yes Yes   Did any of your relatives have bilateral breast cancer? Yes Yes   Did any man in your family have breast cancer? No No   Did any woman in your family have breast and ovarian cancer? Yes Yes   Did any woman in your family have breast cancer before age 50 y? No No   Do you have 2 or more relatives with breast and/or ovarian cancer? Unknown Unknown   Do you have 2 or more relatives with breast and/or bowel cancer? No No         Pertinent mammograms are reviewed under the imaging tab.    Review of Systems   Constitutional: Negative for chills and fever.   HENT: Positive for congestion and ear pain. Negative for hearing loss and sore throat.    Eyes: Positive for pain and visual disturbance.   Respiratory: Positive for cough and shortness of breath.    Cardiovascular: Positive for peripheral edema. Negative for chest pain and palpitations.   Gastrointestinal: Positive for heartburn and hematochezia. Negative for abdominal pain, constipation, diarrhea and nausea.   Breasts:  Negative for tenderness, breast mass and discharge.   Genitourinary: Positive for frequency. Negative for dysuria, genital sores, hematuria, pelvic pain, urgency, vaginal bleeding and vaginal discharge.   Musculoskeletal: Positive for myalgias. Negative for arthralgias and joint swelling.   Skin: Negative for rash.   Neurological: Positive for dizziness and paresthesias. Negative for weakness and headaches.   Psychiatric/Behavioral: Negative for mood changes. The patient is not nervous/anxious.          OBJECTIVE:   BP (!) 165/77 (BP Location: Left arm, Patient Position: Sitting, Cuff Size: Adult Regular)   Pulse 92   Resp 14   Ht 1.651 m (5' 5\")   Wt 60.2 kg (132 lb 11.2 oz)   SpO2 99%   BMI 22.08 kg/m   " "Estimated body mass index is 22.08 kg/m  as calculated from the following:    Height as of this encounter: 1.651 m (5' 5\").    Weight as of this encounter: 60.2 kg (132 lb 11.2 oz).  Physical Exam  EYES: Eyelids, conjunctiva, and sclera were normal. Pupils were normal. Cornea, iris, and lens were normal bilaterally.  HEAD, EARS, NOSE, MOUTH, AND THROAT: Head and face were normal. Hearing was normal to voice and the ears were normal to external exam.   NECK: Neck appearance was normal. There were no neck masses and the thyroid was not enlarged.  RESPIRATORY: Breathing pattern was normal and the chest moved symmetrically.  Percussion/auscultatory percussion was normal.  Lung sounds were normal and there were no abnormal sounds.  CARDIOVASCULAR: Heart rate and rhythm were normal.  S1 and S2 were normal and there were no extra sounds or murmurs. Peripheral pulses in arms and legs were normal.  Jugular venous pressure was normal.  There was no peripheral edema.  GASTROINTESTINAL: The abdomen was normal in contour.  Bowel sounds were present.  Percussion detected no organ enlargement or tenderness.  Palpation detected no tenderness, mass, or enlarged organs.   MUSCULOSKELETAL: Skeletal configuration was normal and muscle mass was normal for age. Joint appearance was overall normal.  LYMPHATIC: There were no enlarged nodes.  SKIN/HAIR/NAILS: Skin color was normal.  There were no skin lesions.  Hair and nails were normal.  NEUROLOGIC: The patient was alert and oriented to person, place, time, and circumstance.  Deaf.  Annual nerves otherwise normal.  Motor strength was normal for age. The patient was normally coordinated.  PSYCHIATRIC:  Mood and affect were normal and the patient had normal recent and remote memory. The patient's judgment and insight were normal.        ASSESSMENT / PLAN:   1. Annual physical exam  This is a 67-year-old woman with issues as discussed below    2. Screen for colon cancer  - COLOGUARD(EXACT " "SCIENCES)    3. Visit for screening mammogram  - MA SCREENING DIGITAL BILAT - Future  (s+30); Future    4. Hypertension  Stop losartan hydrochlorothiazide, start medication as below check labs, monitor blood pressure closely at home, sending numbers in the next couple of weeks  - olmesartan-hydrochlorothiazide (BENICAR HCT) 40-12.5 MG tablet; Take 1 tablet by mouth daily  Dispense: 90 tablet; Refill: 4  - Basic metabolic panel; Future  - CBC with platelets; Future  - UA reflex to Microscopic and Culture; Future  - Basic metabolic panel  - CBC with platelets  - UA reflex to Microscopic and Culture  - Urine Microscopic    5. Lung nodules - annual screen due 6/2019    6. History of Clostridioides difficile colitis  Doing well on vancomycin    7. Age-related osteoporosis without current pathological fracture  Declines treatment, continue with adequate calcium, vitamin D and weightbearing exercise.  Smoking cessation    8. Simple chronic bronchitis (H)  9. Personal history of tobacco use  - Prof fee: Shared Decision Making for Lung Cancer Screening  - CT Chest Lung Cancer Scrn Low Dose wo; Future    10. Bilateral deafness    11. Gastroesophageal reflux disease without esophagitis  Stable    12. Mixed hyperlipidemia  Recommend she continue atorvastatin  - Lipid panel reflex to direct LDL Fasting; Future  - Lipid panel reflex to direct LDL Fasting      COUNSELING:    Estimated body mass index is 22.08 kg/m  as calculated from the following:    Height as of this encounter: 1.651 m (5' 5\").    Weight as of this encounter: 60.2 kg (132 lb 11.2 oz).      She reports that she has been smoking cigarettes. She has been smoking about 0.50 packs per day. She has never used smokeless tobacco.  Nicotine/Tobacco Cessation Plan:   Information offered: Patient not interested at this time      Appropriate preventive services were discussed with this patient, including applicable screening as appropriate for cardiovascular disease, " diabetes, osteopenia/osteoporosis, and glaucoma.  As appropriate for age/gender, discussed screening for colorectal cancer, prostate cancer, breast cancer, and cervical cancer. Checklist reviewing preventive services available has been given to the patient.    Reviewed patients plan of care and provided an AVS. The Basic Care Plan (routine screening as documented in Health Maintenance) for Verena meets the Care Plan requirement. This Care Plan has been established and reviewed with the Patient.    Counseling Resources:  ATP IV Guidelines  Pooled Cohorts Equation Calculator  Breast Cancer Risk Calculator  Breast Cancer: Medication to Reduce Risk  FRAX Risk Assessment  ICSI Preventive Guidelines  Dietary Guidelines for Americans, 2010  USDA's MyPlate  ASA Prophylaxis  Lung CA Screening    Tomasz Beltran MD  [facility]    Identified Health Risks:

## 2022-10-04 NOTE — PROGRESS NOTES
"  Lung Cancer Screening Shared Decision Making Visit     Verena Lagunas, a 67 year old female, is eligible for lung cancer screening    History   Smoking Status     Current Every Day Smoker     Packs/day: 0.50     Types: Cigarettes   Smokeless Tobacco     Never Used       I have discussed with patient the risks and benefits of screening for lung cancer with low-dose CT.     The risks include:    radiation exposure: one low dose chest CT has as much ionizing radiation as about 15 chest x-rays, or 6 months of background radiation living in [place]      false positives: most findings/nodules are NOT cancer, but some might still require additional diagnostic evaluation, including biopsy    over-diagnosis: some slow growing cancers that might never have been clinically significant will be detected and treated unnecessarily     The benefit of early detection of lung cancer is contingent upon adherence to annual screening or more frequent follow up if indicated.     Furthermore, to benefit from screening, Verena must be willing and able to undergo diagnostic procedures, if indicated. Although no specific guide is available for determining severity of comorbidities, it is reasonable to withhold screening in patients who have greater mortality risk from other diseases.     We did discuss that the best way to prevent lung cancer is to not smoke.    Some patients may value a numeric estimation of lung cancer risk when evaluating if lung cancer screening is right for them, here is one calculator:    ShouldIScreen  Answers for HPI/ROS submitted by the patient on 10/4/2022  In general, how would you rate your overall physical health?: fair  Frequency of exercise:: 6-7 days/week  Do you usually eat at least 4 servings of fruit and vegetables a day, include whole grains & fiber, and avoid regularly eating high fat or \"junk\" foods? : Yes  Medication side effects:: Other  Activities of Daily Living: no assistance needed  Home " safety: lack of grab bars in the bathroom  Hearing Impairment:: no hearing concerns  In the past 6 months, have you been bothered by leaking of urine?: No  abdominal pain: No  Blood in stool: Yes  Blood in urine: No  chest pain: No  chills: No  congestion: Yes  constipation: No  cough: Yes  diarrhea: No  dizziness: Yes  ear pain: Yes  eye pain: Yes  nervous/anxious: No  fever: No  frequency: Yes  genital sores: No  headaches: No  hearing loss: No  heartburn: Yes  arthralgias: No  joint swelling: No  peripheral edema: Yes  mood changes: No  myalgias: Yes  nausea: No  dysuria: No  palpitations: No  Skin sensation changes: Yes  sore throat: No  urgency: No  rash: No  shortness of breath: Yes  visual disturbance: Yes  weakness: No  pelvic pain: No  vaginal bleeding: No  vaginal discharge: No  tenderness: No  breast mass: No  breast discharge: No  In general, how would you rate your overall mental or emotional health?: fair  Duration of exercise:: 15-30 minutes

## 2022-10-04 NOTE — PATIENT INSTRUCTIONS
Lung Cancer Screening   Frequently Asked Questions  If you are at high-risk for lung cancer, getting screened with low-dose computed tomography (LDCT) every year can help save your life. This handout offers answers to some of the most common questions about lung cancer screening. If you have other questions, please call [phone] ([phone]).     What is it?  Lung cancer screening uses special X-ray technology to create an image of your lung tissue. The exam is quick and easy and takes less than 10 seconds. We don t give you any medicine or use any needles. You can eat before and after the exam. You don t need to change your clothes as long as the clothing on your chest doesn t contain metal. But, you do need to be able to hold your breath for at least 6 seconds during the exam.    What is the goal of lung cancer screening?  The goal of lung cancer screening is to save lives. Many times, lung cancer is not found until a person starts having physical symptoms. Lung cancer screening can help detect lung cancer in the earliest stages when it may be easier to treat.    Who should be screened for lung cancer?  We suggest lung cancer screening for anyone who is at high-risk for lung cancer. You are in the high-risk group if you:      are between the ages of 55 and 79, and    have smoked at least 1 pack of cigarettes a day for 20 or more years, and    still smoke or have quit within the past 15 years.    However, if you have a new cough or shortness of breath, you should talk to your doctor before being screened.    Why does it matter if I have symptoms?  Certain symptoms can be a sign that you have a condition in your lungs that should be checked and treated by your doctor. These symptoms include fever, chest pain, a new or changing cough, shortness of breath that you have never felt before, coughing up blood or unexplained weight loss. Having any of these symptoms can greatly affect the results of lung  cancer screening.       Should all smokers get an LDCT lung cancer screening exam?  It depends. Lung cancer screening is for a very specific group of men and women who have a history of heavy smoking over a long period of time (see  Who should be screened for lung cancer  above).  I am in the high-risk group, but have been diagnosed with cancer in the past. Is LDCT lung cancer screening right for me?  In some cases, you should not have LDCT lung screening, such as when your doctor is already following your cancer with CT scan studies. Your doctor will help you decide if LDCT lung screening is right for you.  Do I need to have a screening exam every year?  Yes. If you are in the high-risk group described earlier, you should get an LDCT lung cancer screening exam every year until you are 79, or are no longer willing or able to undergo screening and possible procedures to diagnose and treat lung cancer.  How effective is LDCT at preventing death from lung cancer?  Studies have shown that LDCT lung cancer screening can lower the risk of death from lung cancer by 20 percent in people who are at high-risk.  What are the risks?  There are some risks and limitations of LDCT lung cancer screening. We want to make sure you understand the risks and benefits, so please let us know if you have any questions. Your doctor may want to talk with you more about these risks.    Radiation exposure: As with any exam that uses radiation, there is a very small increased risk of cancer. The amount of radiation in LDCT is small--about the same amount a person would get from a mammogram. Your doctor orders the exam when he or she feels the potential benefits outweigh the risks.    False negatives: No test is perfect, including LDCT. It is possible that you may have a medical condition, including lung cancer, that is not found during your exam. This is called a false negative result.    False positives and more testing: LDCT very often finds  something in the lung that could be cancer, but in fact is not. This is called a false positive result. False positive tests often cause anxiety. To make sure these findings are not cancer, you may need to have more tests. These tests will be done only if you give us permission. Sometimes patients need a treatment that can have side effects, such as a biopsy. For more information on false positives, see  What can I expect from the results?     Findings not related to lung cancer: Your LDCT exam also takes pictures of areas of your body next to your lungs. In a very small number of cases, the CT scan will show an abnormal finding in one of these areas, such as your kidneys, adrenal glands, liver or thyroid. This finding may not be serious, but you may need more tests. Your doctor can help you decide what other tests you may need, if any.  What can I expect from the results?  About 1 out of 4 LDCT exams will find something that may need more tests. Most of the time, these findings are lung nodules. Lung nodules are very small collections of tissue in the lung. These nodules are very common, and the vast majority--more than 97 percent--are not cancer (benign). Most are normal lymph nodes or small areas of scarring from past infections.  But, if a small lung nodule is found to be cancer, the cancer can be cured more than 90 percent of the time. To know if the nodule is cancer, we may need to get more images before your next yearly screening exam. If the nodule has suspicious features (for example, it is large, has an odd shape or grows over time), we will refer you to a specialist for further testing.  Will my doctor also get the results?  Yes. Your doctor will get a copy of your results.  Is it okay to keep smoking now that there s a cancer screening exam?  No. Tobacco is one of the strongest cancer-causing agents. It causes not only lung cancer, but other cancers and cardiovascular (heart) diseases as well. The damage  caused by smoking builds over time. This means that the longer you smoke, the higher your risk of disease. While it is never too late to quit, the sooner you quit, the better.  Where can I find help to quit smoking?  The best way to prevent lung cancer is to stop smoking. If you have already quit smoking, congratulations and keep it up! For help on quitting smoking, please call [employer] at 0-480-QUITNOW ([phone]) or the American Cancer Society at 1-617.724.5237 to find local resources near you.  One-on-one health coaching:  If you d prefer to work individually with a health care provider on tobacco cessation, we offer:      Medication Therapy Management:  Our specially trained pharmacists work closely with you and your doctor to help you quit smoking.  Call [phone] or [phone] (toll free).

## 2022-11-01 ENCOUNTER — NURSE TRIAGE (OUTPATIENT)
Dept: NURSING | Facility: CLINIC | Age: 67
End: 2022-11-01

## 2022-11-01 DIAGNOSIS — A04.71 RECURRENT CLOSTRIDIOIDES DIFFICILE DIARRHEA: ICD-10-CM

## 2022-11-01 RX ORDER — VANCOMYCIN HYDROCHLORIDE 125 MG/1
CAPSULE ORAL
Qty: 5 CAPSULE | Refills: 0 | Status: SHIPPED | OUTPATIENT
Start: 2022-11-01 | End: 2023-01-18

## 2022-11-01 NOTE — TELEPHONE ENCOUNTER
Let a message asking for a call back using the  service provided on the home line.     Please confirm that pt would like medication sent to [facility] [address] [place] [address].

## 2022-11-01 NOTE — TELEPHONE ENCOUNTER
Nurse Triage SBAR    Is this a 2nd Level Triage? YES, LICENSED PRACTITIONER REVIEW IS REQUIRED    Situation:   Pt calling because she does not have enough vancomycin to complete her prescribed course of the antibiotic.    Background: Pt was Rx'd a vanco taper for recurrent C. Diff on 10/04.     Assessment: Pt has been following the prescription as ordered. Says she is missing 5 capsules. Pt  has not contacted the pharmacy about the missing medication.    Protocol Recommended Disposition:   Discuss With PCP And Callback By Nurse Today, See More Appropriate Protocol    Recommendation: Protocol recommends discuss with PCP and callback by nurse today. Will route message to PCP and care team. Pt states that she will call the pharmacy when they open.    Routed to provider    Does the patient meet one of the following criteria for ADS visit consideration? 16+ years old, with an [facility] PCP     TIP  Providers, please consider if this condition is appropriate for management at one of our Acute and Diagnostic Services sites.     If patient is a good candidate, please use dotphrase <dot>triageresponse and select Refer to ADS to document.    Julieta Ruth, RN, BSN  [facility]   Triage Nurse Advisor      Reason for Disposition    Caller requesting a renewal or refill of a medicine patient is currently taking    Prescription refill request for NON-ESSENTIAL medicine (i.e., no harm to patient if med not taken) and triager unable to refill per department policy    Additional Information    Negative: Caller requesting a CONTROLLED substance prescription refill (e.g., narcotics, ADHD medicines)    Negative: Prescription refill request for ESSENTIAL medicine (i.e., likelihood of harm to patient if not taken) and triager unable to refill per department policy    Negative: Prescription not at pharmacy and was prescribed by PCP recently  (Exception: triager has access to EMR and prescription is recorded there. Go to Home Care and  confirm for pharmacy.)    Negative: Pharmacy calling with prescription questions and triager unable to answer question    Negative: New-onset or worsening symptoms, see that protocol (e.g., diarrhea, runny nose, sore throat)    Negative: Medicine question not related to refill or renewal    Negative: Caller (e.g., patient or pharmacist) requesting information about a new medicine    Negative: Caller requesting information unrelated to medicine    Protocols used: MEDICATION QUESTION CALL-A-OH, MEDICATION REFILL AND RENEWAL CALL-A-OH

## 2022-11-01 NOTE — TELEPHONE ENCOUNTER
If she is missing 5 capsules of vancomycin I think the simplest thing to do would be to send a prescription for 5 capsules to vancomycin to her pharmacy.  Would somebody be able to send this in or set it up for me to sign and send in?

## 2022-11-07 DIAGNOSIS — E78.2 MIXED HYPERLIPIDEMIA: ICD-10-CM

## 2022-11-09 RX ORDER — ATORVASTATIN CALCIUM 20 MG/1
20 TABLET, FILM COATED ORAL DAILY
Qty: 90 TABLET | Refills: 3 | Status: SHIPPED | OUTPATIENT
Start: 2022-11-09 | End: 2023-01-18

## 2022-11-09 NOTE — TELEPHONE ENCOUNTER
"Last Written Prescription Date:  11/18/21  Last Fill Quantity: 90,  # refills: 3   Last office visit provider:  10/4/22     Requested Prescriptions   Pending Prescriptions Disp Refills     atorvastatin (LIPITOR) 20 MG tablet 90 tablet 3     Sig: Take 1 tablet (20 mg) by mouth daily       [facility] Protocol Passed - 11/7/2022  4:05 PM        Passed - LDL on file in past 12 months     Recent Labs   Lab Test 10/04/22  1214   LDL 77             Passed - No abnormal creatine kinase in past 12 months     No lab results found.             Passed - Recent (12 mo) or future (30 days) visit within the authorizing provider's specialty     Patient has had an office visit with the authorizing provider or a provider within the authorizing providers department within the previous 12 mos or has a future within next 30 days. See \"Patient Info\" tab in inbasket, or \"Choose Columns\" in Meds & Orders section of the refill encounter.              Passed - Medication is active on med list        Passed - Patient is age 18 or older        Passed - No active pregnancy on record        Passed - No positive pregnancy test in past 12 months             Cristina Sarabia RN 11/09/22 4:23 PM  "

## 2022-11-29 DIAGNOSIS — I10 ESSENTIAL HYPERTENSION: ICD-10-CM

## 2022-11-30 RX ORDER — LOSARTAN POTASSIUM AND HYDROCHLOROTHIAZIDE 12.5; 5 MG/1; MG/1
1 TABLET ORAL DAILY
Qty: 90 TABLET | Refills: 0 | OUTPATIENT
Start: 2022-11-30

## 2022-11-30 NOTE — TELEPHONE ENCOUNTER
Outpatient Medication Detail     Disp Refills Start End AMILCAR   losartan-hydrochlorothiazide (HYZAAR) 50-12.5 MG tablet (Discontinued) 90 tablet 0 6/3/2022 10/4/2022 No   Sig - Route: Take 1 tablet by mouth daily - Oral   Sent to pharmacy as: Losartan Potassium-HCTZ 50-12.5 MG Oral Tablet (HYZAAR)   Class: E-Prescribe   Order: [id number]   E-Prescribing Status: Receipt confirmed by pharmacy (6/3/2022  7:09 PM CDT)

## 2023-01-18 ENCOUNTER — LAB (OUTPATIENT)
Dept: INTERNAL MEDICINE | Facility: CLINIC | Age: 68
End: 2023-01-18

## 2023-01-18 ENCOUNTER — OFFICE VISIT (OUTPATIENT)
Dept: INTERNAL MEDICINE | Facility: CLINIC | Age: 68
End: 2023-01-18
Payer: COMMERCIAL

## 2023-01-18 VITALS
HEART RATE: 88 BPM | RESPIRATION RATE: 10 BRPM | DIASTOLIC BLOOD PRESSURE: 62 MMHG | TEMPERATURE: 97.7 F | HEIGHT: 66 IN | WEIGHT: 126.6 LBS | OXYGEN SATURATION: 98 % | SYSTOLIC BLOOD PRESSURE: 132 MMHG | BODY MASS INDEX: 20.34 KG/M2

## 2023-01-18 DIAGNOSIS — I10 ESSENTIAL HYPERTENSION: Primary | ICD-10-CM

## 2023-01-18 DIAGNOSIS — E78.2 MIXED HYPERLIPIDEMIA: ICD-10-CM

## 2023-01-18 DIAGNOSIS — Z86.19 HISTORY OF CLOSTRIDIOIDES DIFFICILE COLITIS: ICD-10-CM

## 2023-01-18 DIAGNOSIS — Z12.11 SCREEN FOR COLON CANCER: ICD-10-CM

## 2023-01-18 DIAGNOSIS — F17.219 CIGARETTE NICOTINE DEPENDENCE WITH NICOTINE-INDUCED DISORDER: ICD-10-CM

## 2023-01-18 DIAGNOSIS — J30.9 ALLERGIC RHINITIS, UNSPECIFIED SEASONALITY, UNSPECIFIED TRIGGER: ICD-10-CM

## 2023-01-18 PROBLEM — J41.0 SIMPLE CHRONIC BRONCHITIS (H): Status: RESOLVED | Noted: 2017-05-31 | Resolved: 2023-01-18

## 2023-01-18 PROBLEM — R91.8 LUNG NODULES: Status: ACTIVE | Noted: 2017-06-07

## 2023-01-18 PROCEDURE — 99214 OFFICE O/P EST MOD 30 MIN: CPT | Performed by: INTERNAL MEDICINE

## 2023-01-18 RX ORDER — ATORVASTATIN CALCIUM 20 MG/1
20 TABLET, FILM COATED ORAL DAILY
Qty: 90 TABLET | Refills: 3 | Status: SHIPPED | OUTPATIENT
Start: 2023-01-18 | End: 2024-01-31

## 2023-01-18 RX ORDER — LOSARTAN POTASSIUM 50 MG/1
50 TABLET ORAL DAILY
Qty: 90 TABLET | Refills: 4 | Status: SHIPPED | OUTPATIENT
Start: 2023-01-18 | End: 2024-01-31

## 2023-01-18 NOTE — PROGRESS NOTES
Office Visit - Follow Up   Verena Lagunas   67 year old female    Date of Visit: 1/18/2023    Chief Complaint   Patient presents with     Recheck Medication     Follow up- stopped taking medication- do not like olmesartan medoxil and hydrochlorithizide medication, medication made pt sick: dizziness, spots (different colored spots), shortness of breath, rapid heartbeat. Before when she ran out of the medication last week pt was feeling fine without the medication but when she went back onto the med she started having those symptoms again. Unsure if nasal medication nay have played an effect to the side effects     Other     Pt had some swelling on the lips, used vancomycin, pt stated that she had diarrhea for 4 days, this happened 3 weeks ago. Pt is finally back to normal and wondering if she can use the colon kits to get the colonoscopy exam done.        Assessment and Plan   1. Hypertension  Stop olmesartan hydrochlorothiazide and start losartan, will see if we can get her a blue pill.  - losartan (COZAAR) 50 MG tablet; Take 1 tablet (50 mg) by mouth daily  Dispense: 90 tablet; Refill: 4    2. Mixed hyperlipidemia  - atorvastatin (LIPITOR) 20 MG tablet; Take 1 tablet (20 mg) by mouth daily  Dispense: 90 tablet; Refill: 3    3. History of Clostridioides difficile colitis  Resolved    4. Allergic rhinitis, unspecified seasonality, unspecified trigger  Stop Flonase, start nasal saline    5. Cigarette nicotine dependence with nicotine-induced disorder  Recommend smoking cessation, she is not interested at this time.  I have ordered a lung cancer screening test which she can order at her convenience.    6. Screen for colon cancer  - [facility]); Future    Return in about 3 months (around 4/18/2023) for Follow up.     History of Present Illness   This 67 year old man comes in for follow-up.  She is seen with a .  She stopped taking her blood pressure medication due to side  "effects.  She had tolerated losartan and hydrochlorothiazide until they changed the color of the pill.  She prefers the blue pill.  Her diarrhea has now resolved.  She is interested in doing stool testing for colon cancer screening.  She wonders if there is a different approach to treating her rhinitis and taking Flonase.  She continues to smoke.  She has not done her lung cancer screening.       Physical Exam   General Appearance:   No acute distress    /62   Pulse 88   Temp 97.7  F (36.5  C) (Oral)   Resp 10   Ht 1.675 m (5' 5.95\")   Wt 57.4 kg (126 lb 9.6 oz)   SpO2 98%   BMI 20.47 kg/m           Additional Information   Current Outpatient Medications   Medication Sig Dispense Refill     aspirin 81 MG EC tablet [ASPIRIN 81 MG EC TABLET] Take 81 mg by mouth daily.       atorvastatin (LIPITOR) 20 MG tablet Take 1 tablet (20 mg) by mouth daily 90 tablet 3     cholecalciferol, vitamin D3, (VITAMIN D3) 1,000 unit capsule [CHOLECALCIFEROL, VITAMIN D3, (VITAMIN D3) 1,000 UNIT CAPSULE] Take 1 capsule (1,000 Units total) by mouth daily.  0     diclofenac sodium (VOLTAREN) 1 % Gel [DICLOFENAC SODIUM (VOLTAREN) 1 % GEL] Apply 2 g topically 4 (four) times a day. 500 g 11     fluticasone propionate (FLONASE) 50 mcg/actuation nasal spray [FLUTICASONE PROPIONATE (FLONASE) 50 MCG/ACTUATION NASAL SPRAY] Shake liquid and use 1 spray in each nostril daily. 16 g 12     losartan (COZAAR) 50 MG tablet Take 1 tablet (50 mg) by mouth daily 90 tablet 4     Allergies   Allergen Reactions     Cat Hair Std Allergenic Ext [Cat Hair Extract] Unknown     Penicillins Shortness Of Breath       Time:      Tomasz Beltran MD    Answers for HPI/ROS submitted by the patient on 1/18/2023  Do you check your blood pressure regularly outside of the clinic?: No  Are your blood pressures ever more than 140 on the top number (systolic) OR more than 90 on the bottom number (diastolic)? (For example, greater than 140/90): Yes  Are you following " a low salt diet?: Yes

## 2023-05-25 ENCOUNTER — OFFICE VISIT (OUTPATIENT)
Dept: FAMILY MEDICINE | Facility: CLINIC | Age: 68
End: 2023-05-25
Payer: COMMERCIAL

## 2023-05-25 VITALS
DIASTOLIC BLOOD PRESSURE: 75 MMHG | WEIGHT: 115 LBS | OXYGEN SATURATION: 95 % | BODY MASS INDEX: 18.59 KG/M2 | RESPIRATION RATE: 26 BRPM | TEMPERATURE: 98.1 F | HEART RATE: 97 BPM | SYSTOLIC BLOOD PRESSURE: 114 MMHG

## 2023-05-25 DIAGNOSIS — Z87.891 HX OF SMOKING: ICD-10-CM

## 2023-05-25 DIAGNOSIS — R06.02 SHORTNESS OF BREATH: ICD-10-CM

## 2023-05-25 DIAGNOSIS — R05.1 ACUTE COUGH: Primary | ICD-10-CM

## 2023-05-25 DIAGNOSIS — J43.2 CENTRILOBULAR EMPHYSEMA (H): ICD-10-CM

## 2023-05-25 PROCEDURE — 94640 AIRWAY INHALATION TREATMENT: CPT | Performed by: NURSE PRACTITIONER

## 2023-05-25 PROCEDURE — 99214 OFFICE O/P EST MOD 30 MIN: CPT | Mod: 25 | Performed by: NURSE PRACTITIONER

## 2023-05-25 RX ORDER — ALBUTEROL SULFATE 90 UG/1
2 AEROSOL, METERED RESPIRATORY (INHALATION) EVERY 6 HOURS PRN
Qty: 18 G | Refills: 0 | Status: SHIPPED | OUTPATIENT
Start: 2023-05-25 | End: 2023-06-08

## 2023-05-25 RX ORDER — AZITHROMYCIN 250 MG/1
TABLET, FILM COATED ORAL
Qty: 6 TABLET | Refills: 0 | Status: SHIPPED | OUTPATIENT
Start: 2023-05-25 | End: 2023-05-30

## 2023-05-25 RX ORDER — GUAIFENESIN 600 MG/1
1200 TABLET, EXTENDED RELEASE ORAL 2 TIMES DAILY PRN
Qty: 20 TABLET | Refills: 0 | Status: SHIPPED | OUTPATIENT
Start: 2023-05-25 | End: 2023-06-15

## 2023-05-25 RX ORDER — ALBUTEROL SULFATE 0.83 MG/ML
2.5 SOLUTION RESPIRATORY (INHALATION) ONCE
Status: COMPLETED | OUTPATIENT
Start: 2023-05-25 | End: 2023-05-25

## 2023-05-25 RX ADMIN — ALBUTEROL SULFATE 2.5 MG: 0.83 SOLUTION RESPIRATORY (INHALATION) at 15:02

## 2023-05-25 ASSESSMENT — ENCOUNTER SYMPTOMS
SORE THROAT: 0
CHEST TIGHTNESS: 0
CHILLS: 1
SHORTNESS OF BREATH: 1
FEVER: 0
WHEEZING: 0
COUGH: 1

## 2023-05-25 NOTE — PATIENT INSTRUCTIONS
This might be a virus or bacteria    Treating this with azithromycin (antibiotic)    Guaifenesin for congestion as needed     Inhaler as needed for difficulty breathing

## 2023-05-25 NOTE — LETTER
May 25, 2023      Verena Lagunas  [address]   [place] [address]        To Whom It May Concern:    Verena Lagunas  was seen on 5/25.  Please excuse her  until 5/30 due to illness.        Sincerely,        Hansa Granger, CNP

## 2023-05-25 NOTE — PROGRESS NOTES
Assessment & Plan     Shortness of breath    - albuterol (PROVENTIL) neb solution 2.5 mg  - albuterol (PROAIR HFA/PROVENTIL HFA/VENTOLIN HFA) 108 (90 Base) MCG/ACT inhaler  Dispense: 18 g; Refill: 0  - Optichamber/Spacer Order for DME - ONLY FOR DME    Acute cough    - albuterol (PROVENTIL) neb solution 2.5 mg  - guaiFENesin (MUCINEX) 600 MG 12 hr tablet  Dispense: 20 tablet; Refill: 0    Hx of smoking    - azithromycin (ZITHROMAX) 250 MG tablet  Dispense: 6 tablet; Refill: 0    Centrilobular emphysema (H)     -Seen on 2021 chest CT for screening    Focused exam and history done due to COVID-19 pandemic in a walk-in setting.      History, exam, and vital signs consistent with a viral URI vs undiagnosed COPD exacerbation.  Patient did have some emphysema noted on a 2021 routine chest CT for cancer screening.  No official diagnosis of COPD.  Patient has been having green and black sputum, some shortness of breath associated with this.    Did give 1 albuterol nebulizer today.  Patient says she believes her breathing is a little better with this.  No wheezing on exam.  Diminished throughout.    Patient would like inhaler for home.  Explained how to use with spacer.    In this case, given the degree of brownish-green sputum and associated shortness of breath with possible or probable emphysema, will treat as a COPD exacerbation with azithromycin.  Due to the absence of wheezing and improvement with inhaler, it will not give steroids at this time.    + Mucinex, Tylenol as needed.    Told w  to come back at anytime with worsening shortness of breath despite these treatments, any fevers over 100.4.  Should follow-up semiurgently with her PCP next week ideally.      26 minutes spent by me on the date of the encounter doing chart review, patient visit and documentation         Return in about 3 days (around 5/28/2023) for If no better.    Hansa Granger [facility]  "    Verena is a 68 year old female who presents to clinic today for the following health issues:  Chief Complaint   Patient presents with     Cough     X last friday. Congestion, green-brown mucous phelgm, SOB, some chest tightness when coughing fits. Dizziness, no headache. Has not checked temp. Difficulty swallowing but no throat pain.     Letter for School/Work     Work note for today's visit and to be excused until Tuesday 5/30/2023     HPI    Cough constantly for the last 6 days.  Congestion, green/brown mucus.  + Smoker.      Feels like she is breathing harder than usual, rafi with eating.      Has needed inhalers at times in the past.     No diagnosis of COPD, but she did have mild amount of emphysema noted on the upper lungs.  Does get chest CT screenings for cancer.  Last done in 2021, was normal. Was told by a specialist she doesn't have COPD \"many years ago.\"      Due to language barrier, an  was present during the history-taking and subsequent discussion (and for part of the physical exam) with this patient.        Review of Systems   Constitutional: Positive for chills (Alternating hot and cold, no measured fever). Negative for fever.   HENT: Negative for ear pain and sore throat.    Respiratory: Positive for cough and shortness of breath. Negative for chest tightness and wheezing.            Objective    /75 (BP Location: Right arm, Patient Position: Sitting, Cuff Size: Adult Small)   Pulse 97   Temp 98.1  F (36.7  C) (Oral)   Resp 26   Wt 52.2 kg (115 lb)   SpO2 95%   BMI 18.59 kg/m    Physical Exam  Constitutional:       General: She is not in acute distress.     Appearance: She is well-developed.   HENT:      Nose: Congestion present.   Eyes:      General:         Right eye: No discharge.         Left eye: No discharge.      Conjunctiva/sclera: Conjunctivae normal.   Cardiovascular:      Rate and Rhythm: Normal rate and regular rhythm.      Pulses: Normal pulses.      Heart " sounds: Normal heart sounds.   Pulmonary:      Effort: Pulmonary effort is normal.      Breath sounds: No wheezing.      Comments: Diminished throughout  Wet cough noted  Musculoskeletal:         General: Normal range of motion.   Skin:     General: Skin is warm and dry.      Capillary Refill: Capillary refill takes less than 2 seconds.   Neurological:      Mental Status: She is alert and oriented to person, place, and time.   Psychiatric:         Mood and Affect: Mood normal.         Behavior: Behavior normal.         Thought Content: Thought content normal.         Judgment: Judgment normal.

## 2023-05-30 ENCOUNTER — NURSE TRIAGE (OUTPATIENT)
Dept: NURSING | Facility: CLINIC | Age: 68
End: 2023-05-30
Payer: COMMERCIAL

## 2023-05-30 NOTE — TELEPHONE ENCOUNTER
Patient is calling and states that last week patient has been sick.  Thursday went to [place] in on 5/25/2023  and was told that she has a virus.  Patient has been coughing and bringing up mucus and feels week.  Patient is also having shortness of breath.  Patient was prescribed Azithromycin 250 mg tablet.  Patient was given a work note and was suppose to go back to work today and patient feels worse.  Diagnosis was acute cough and shortness of breath and history of smoking.  Patient was told that she has something viral or bacterial.   Patient states that she is  coughing up green mucus.  Patient has to lie down when having troubles breathing.  Patient states that she is having troubles walking as she has to walk very slow.   Patient states that at times she is having slow, shallow and weak breathing.        Reason for Disposition    Slow, shallow and weak breathing    Additional Information    Negative: SEVERE difficulty breathing (e.g., struggling for each breath, speaks in single words, pulse > 120)    Negative: Breathing stopped and hasn't returned    Negative: Choking on something    Negative: Bluish (or gray) lips or face    Negative: Difficult to awaken or acting confused (e.g., disoriented, slurred speech)    Negative: Passed out (i.e., fainted, collapsed and was not responding)    Negative: Wheezing started suddenly after medicine, an allergic food, or bee sting    Negative: Stridor    Protocols used: BREATHING DIFFICULTY-A-OH

## 2023-06-01 ENCOUNTER — ANCILLARY PROCEDURE (OUTPATIENT)
Dept: GENERAL RADIOLOGY | Facility: CLINIC | Age: 68
End: 2023-06-01
Attending: INTERNAL MEDICINE
Payer: COMMERCIAL

## 2023-06-01 ENCOUNTER — OFFICE VISIT (OUTPATIENT)
Dept: INTERNAL MEDICINE | Facility: CLINIC | Age: 68
End: 2023-06-01
Payer: COMMERCIAL

## 2023-06-01 VITALS
TEMPERATURE: 98.5 F | HEART RATE: 78 BPM | WEIGHT: 116 LBS | HEIGHT: 66 IN | DIASTOLIC BLOOD PRESSURE: 78 MMHG | OXYGEN SATURATION: 98 % | BODY MASS INDEX: 18.64 KG/M2 | SYSTOLIC BLOOD PRESSURE: 116 MMHG | RESPIRATION RATE: 16 BRPM

## 2023-06-01 DIAGNOSIS — J41.0 SIMPLE CHRONIC BRONCHITIS (H): ICD-10-CM

## 2023-06-01 DIAGNOSIS — I50.21 ACUTE SYSTOLIC CONGESTIVE HEART FAILURE (H): ICD-10-CM

## 2023-06-01 DIAGNOSIS — F17.219 CIGARETTE NICOTINE DEPENDENCE WITH NICOTINE-INDUCED DISORDER: ICD-10-CM

## 2023-06-01 DIAGNOSIS — I48.91 ATRIAL FIBRILLATION WITH RVR (H): Primary | ICD-10-CM

## 2023-06-01 LAB
ANION GAP SERPL CALCULATED.3IONS-SCNC: 15 MMOL/L (ref 7–15)
ATRIAL RATE - MUSE: 136 BPM
BNP SERPL-MCNC: 251 PG/ML (ref 0–117)
BUN SERPL-MCNC: 19.9 MG/DL (ref 8–23)
CALCIUM SERPL-MCNC: 9.4 MG/DL (ref 8.8–10.2)
CHLORIDE SERPL-SCNC: 108 MMOL/L (ref 98–107)
CREAT SERPL-MCNC: 0.74 MG/DL (ref 0.51–0.95)
DEPRECATED HCO3 PLAS-SCNC: 26 MMOL/L (ref 22–29)
DIASTOLIC BLOOD PRESSURE - MUSE: NORMAL MMHG
ERYTHROCYTE [DISTWIDTH] IN BLOOD BY AUTOMATED COUNT: 12.2 % (ref 10–15)
GFR SERPL CREATININE-BSD FRML MDRD: 87 ML/MIN/1.73M2
GLUCOSE SERPL-MCNC: 133 MG/DL (ref 70–99)
HCT VFR BLD AUTO: 43.1 % (ref 35–47)
HGB BLD-MCNC: 13.7 G/DL (ref 11.7–15.7)
INTERPRETATION ECG - MUSE: NORMAL
MCH RBC QN AUTO: 27.7 PG (ref 26.5–33)
MCHC RBC AUTO-ENTMCNC: 31.8 G/DL (ref 31.5–36.5)
MCV RBC AUTO: 87 FL (ref 78–100)
P AXIS - MUSE: NORMAL DEGREES
PLATELET # BLD AUTO: 415 10E3/UL (ref 150–450)
POTASSIUM SERPL-SCNC: 3.9 MMOL/L (ref 3.4–5.3)
PR INTERVAL - MUSE: NORMAL MS
QRS DURATION - MUSE: 90 MS
QT - MUSE: 324 MS
QTC - MUSE: 493 MS
R AXIS - MUSE: 83 DEGREES
RBC # BLD AUTO: 4.94 10E6/UL (ref 3.8–5.2)
SODIUM SERPL-SCNC: 149 MMOL/L (ref 136–145)
SYSTOLIC BLOOD PRESSURE - MUSE: NORMAL MMHG
T AXIS - MUSE: 68 DEGREES
TSH SERPL DL<=0.005 MIU/L-ACNC: 0.61 UIU/ML (ref 0.3–4.2)
VENTRICULAR RATE- MUSE: 139 BPM
WBC # BLD AUTO: 14 10E3/UL (ref 4–11)

## 2023-06-01 PROCEDURE — 71046 X-RAY EXAM CHEST 2 VIEWS: CPT | Mod: TC | Performed by: RADIOLOGY

## 2023-06-01 PROCEDURE — 84443 ASSAY THYROID STIM HORMONE: CPT | Performed by: INTERNAL MEDICINE

## 2023-06-01 PROCEDURE — 93005 ELECTROCARDIOGRAM TRACING: CPT | Performed by: INTERNAL MEDICINE

## 2023-06-01 PROCEDURE — 80048 BASIC METABOLIC PNL TOTAL CA: CPT | Performed by: INTERNAL MEDICINE

## 2023-06-01 PROCEDURE — 36415 COLL VENOUS BLD VENIPUNCTURE: CPT | Performed by: INTERNAL MEDICINE

## 2023-06-01 PROCEDURE — T1013 SIGN LANG/ORAL INTERPRETER: HCPCS | Mod: U3

## 2023-06-01 PROCEDURE — 99215 OFFICE O/P EST HI 40 MIN: CPT | Performed by: INTERNAL MEDICINE

## 2023-06-01 PROCEDURE — 93010 ELECTROCARDIOGRAM REPORT: CPT | Performed by: INTERNAL MEDICINE

## 2023-06-01 PROCEDURE — 83880 ASSAY OF NATRIURETIC PEPTIDE: CPT | Performed by: INTERNAL MEDICINE

## 2023-06-01 PROCEDURE — 85027 COMPLETE CBC AUTOMATED: CPT | Performed by: INTERNAL MEDICINE

## 2023-06-01 RX ORDER — DILTIAZEM HYDROCHLORIDE 120 MG/1
120 CAPSULE, EXTENDED RELEASE ORAL DAILY
Qty: 90 CAPSULE | Refills: 1 | Status: SHIPPED | OUTPATIENT
Start: 2023-06-01 | End: 2023-06-08

## 2023-06-01 NOTE — PROGRESS NOTES
Office Visit - Follow Up   Verena Lagunas   68 year old female    Date of Visit: 6/1/2023    Chief Complaint   Patient presents with     Hospital F/U     Pt was seen in hospital on 5/25/23 for coughing with yellow/green mucous with SOB. Pt reports she isnt felling better. Wondering if she needs a abx. Pt also reports she needs a letter as to when she can go back to work.        Assessment and Plan   1. Atrial fibrillation with RVR (H)  New onset of atrial fibrillation.  She has rapid ventricular response and will start on diltiazem, avoiding beta-blockers because of her chronic bronchitis.  This is not an absolute contraindication to the blocker use and we certainly could switch to beta-blocker if we cannot control her heart rate with diltiazem.  Starting Eliquis, discussed risk of stroke.  Evaluation as below including labs, echocardiogram and cardiology consultation.  I would like to see her back in 1 week  - EKG 12-lead, tracing only  - diltiazem ER (DILT-XR) 120 MG 24 hr capsule; Take 1 capsule (120 mg) by mouth daily  Dispense: 90 capsule; Refill: 1  - apixaban ANTICOAGULANT (ELIQUIS ANTICOAGULANT) 5 MG tablet; Take 1 tablet (5 mg) by mouth 2 times daily  Dispense: 180 tablet; Refill: 3  - Basic metabolic panel; Future  - TSH with free T4 reflex; Future  - CBC with platelets; Future  - B-Type Natriuretic Peptide ([facility] Only); Future  - Echocardiogram Complete; Future  - Adult Cardiology Eval  Referral; Future  - Basic metabolic panel  - TSH with free T4 reflex  - CBC with platelets  - B-Type Natriuretic Peptide ([facility] Only)    2. Simple chronic bronchitis (H)  I reviewed her chest x-ray there is no focal findings suggestive of infection  - XR Chest 2 Views; Future    3. Cigarette nicotine dependence with nicotine-induced disorder  Congratulated her on smoking cessation    Return in about 1 week (around 6/8/2023) for Follow up.     History of Present Illness   This 68 year old woman comes in with  "symptoms of shortness of breath especially with exertion.  She has had a slight productive cough and was seen in urgent care and given an antibiotic and a bronchodilator.  She has not been using albuterol because she does not know how to use it.  Smoking 1       Physical Exam   General Appearance:   No acute distress    /78 (BP Location: Left arm, Patient Position: Sitting, Cuff Size: Adult Regular)   Pulse 78   Temp 98.5  F (36.9  C) (Tympanic)   Resp 16   Ht 1.676 m (5' 6\")   Wt 52.6 kg (116 lb)   SpO2 98%   BMI 18.72 kg/m      Heart rate is rapid rhythm is irregular lungs with occasional rhonchi but no crackles or wheezing.  Abdomen soft no edema     Additional Information   Current Outpatient Medications   Medication Sig Dispense Refill     albuterol (PROAIR HFA/PROVENTIL HFA/VENTOLIN HFA) 108 (90 Base) MCG/ACT inhaler Inhale 2 puffs into the lungs every 6 hours as needed for shortness of breath, wheezing or cough 18 g 0     apixaban ANTICOAGULANT (ELIQUIS ANTICOAGULANT) 5 MG tablet Take 1 tablet (5 mg) by mouth 2 times daily 180 tablet 3     atorvastatin (LIPITOR) 20 MG tablet Take 1 tablet (20 mg) by mouth daily 90 tablet 3     cholecalciferol, vitamin D3, (VITAMIN D3) 1,000 unit capsule [CHOLECALCIFEROL, VITAMIN D3, (VITAMIN D3) 1,000 UNIT CAPSULE] Take 1 capsule (1,000 Units total) by mouth daily.  0     diclofenac sodium (VOLTAREN) 1 % Gel [DICLOFENAC SODIUM (VOLTAREN) 1 % GEL] Apply 2 g topically 4 (four) times a day. 500 g 11     diltiazem ER (DILT-XR) 120 MG 24 hr capsule Take 1 capsule (120 mg) by mouth daily 90 capsule 1     fluticasone propionate (FLONASE) 50 mcg/actuation nasal spray [FLUTICASONE PROPIONATE (FLONASE) 50 MCG/ACTUATION NASAL SPRAY] Shake liquid and use 1 spray in each nostril daily. 16 g 12     guaiFENesin (MUCINEX) 600 MG 12 hr tablet Take 2 tablets (1,200 mg) by mouth 2 times daily as needed for congestion 20 tablet 0     losartan (COZAAR) 50 MG tablet Take 1 tablet (50 " mg) by mouth daily 90 tablet 4       This is a patient with an acute high complexity problem that poses threat to bodily function, numerous labs were ordered, EKG ordered, EKG personally interpreted as above and chest x-ray personally interpreted as above     Tmoasz Beltran MD

## 2023-06-01 NOTE — LETTER
June 1, 2023      Verena Lagunas  [address]   [place] [address]        To Whom It May Concern:    Verena Lagunas  was seen on 6/1/2023.  Please excuse her  until 6/15/23 due to illness.        Sincerely,        Tomasz Beltran MD

## 2023-06-07 ENCOUNTER — HOSPITAL ENCOUNTER (OUTPATIENT)
Dept: CARDIOLOGY | Facility: HOSPITAL | Age: 68
Discharge: HOME | End: 2023-06-07
Attending: INTERNAL MEDICINE | Admitting: INTERNAL MEDICINE
Payer: COMMERCIAL

## 2023-06-07 DIAGNOSIS — I48.91 ATRIAL FIBRILLATION WITH RVR (H): ICD-10-CM

## 2023-06-07 LAB — LVEF ECHO: NORMAL

## 2023-06-07 PROCEDURE — 93306 TTE W/DOPPLER COMPLETE: CPT

## 2023-06-07 PROCEDURE — 93306 TTE W/DOPPLER COMPLETE: CPT | Mod: 26 | Performed by: INTERNAL MEDICINE

## 2023-06-08 ENCOUNTER — OFFICE VISIT (OUTPATIENT)
Dept: INTERNAL MEDICINE | Facility: CLINIC | Age: 68
End: 2023-06-08
Payer: COMMERCIAL

## 2023-06-08 VITALS
OXYGEN SATURATION: 95 % | TEMPERATURE: 97.7 F | RESPIRATION RATE: 16 BRPM | BODY MASS INDEX: 18.64 KG/M2 | WEIGHT: 116 LBS | DIASTOLIC BLOOD PRESSURE: 75 MMHG | HEIGHT: 66 IN | HEART RATE: 86 BPM | SYSTOLIC BLOOD PRESSURE: 106 MMHG

## 2023-06-08 DIAGNOSIS — J41.0 SIMPLE CHRONIC BRONCHITIS (H): ICD-10-CM

## 2023-06-08 DIAGNOSIS — I48.91 ATRIAL FIBRILLATION WITH RVR (H): ICD-10-CM

## 2023-06-08 DIAGNOSIS — I50.21 ACUTE SYSTOLIC CONGESTIVE HEART FAILURE (H): Primary | ICD-10-CM

## 2023-06-08 DIAGNOSIS — R06.02 SHORTNESS OF BREATH: ICD-10-CM

## 2023-06-08 PROCEDURE — 99215 OFFICE O/P EST HI 40 MIN: CPT | Performed by: INTERNAL MEDICINE

## 2023-06-08 RX ORDER — FUROSEMIDE 20 MG
20 TABLET ORAL DAILY
Qty: 5 TABLET | Refills: 0 | Status: SHIPPED | OUTPATIENT
Start: 2023-06-08 | End: 2023-06-23

## 2023-06-08 RX ORDER — ALBUTEROL SULFATE 90 UG/1
2 AEROSOL, METERED RESPIRATORY (INHALATION) EVERY 6 HOURS PRN
Qty: 18 G | Refills: 11 | Status: SHIPPED | OUTPATIENT
Start: 2023-06-08

## 2023-06-08 RX ORDER — ALBUTEROL SULFATE 90 UG/1
2 AEROSOL, METERED RESPIRATORY (INHALATION) EVERY 6 HOURS PRN
Qty: 18 G | Refills: 0 | Status: SHIPPED | OUTPATIENT
Start: 2023-06-08 | End: 2023-06-08

## 2023-06-08 RX ORDER — METOPROLOL SUCCINATE 50 MG/1
50 TABLET, EXTENDED RELEASE ORAL DAILY
Qty: 90 TABLET | Refills: 4 | Status: SHIPPED | OUTPATIENT
Start: 2023-06-08 | End: 2023-06-15

## 2023-06-08 ASSESSMENT — PAIN SCALES - GENERAL: PAINLEVEL: SEVERE PAIN (7)

## 2023-06-08 NOTE — PROGRESS NOTES
Office Visit - Follow Up   Verena Lagunas   68 year old female    Date of Visit: 6/8/2023    Chief Complaint   Patient presents with     Follow Up     Heart issues and difficulty breathing and productive cough x2 weeks. OTC products not working.  Quit smoking 2 weeks ago. Patient has 2 new meds. Eliquis 6/1 5mg  1 bid and Diltiazem 120mg 1 daily. Patient concerned with weight loss and energy level. Patient had a heart US at [facility]'s 6/7        Assessment and Plan   1. Shortness of breath  2. Acute systolic congestive heart failure (H)  I believe her presentation is most consistent with acute systolic congestive heart failure rather than any component of a COPD exacerbation or acute bronchitis.  Etiology needs to be determined and I have placed a referral for rapid access cardiology clinic.  I would like to stop diltiazem and start metoprolol and have her follow-up with me in 1 week.  I will also have her take furosemide 20 mg daily for 5 days.  [facility] paperwork has been filled out.  - furosemide (LASIX) 20 MG tablet; Take 1 tablet (20 mg) by mouth daily for 5 days  Dispense: 5 tablet; Refill: 0    3. Atrial fibrillation with RVR (H)  - metoprolol succinate ER (TOPROL XL) 50 MG 24 hr tablet; Take 1 tablet (50 mg) by mouth daily  Dispense: 90 tablet; Refill: 4    4. Simple chronic bronchitis (H)  - albuterol (PROAIR HFA/PROVENTIL HFA/VENTOLIN HFA) 108 (90 Base) MCG/ACT inhaler; Inhale 2 puffs into the lungs every 6 hours as needed for shortness of breath, wheezing or cough  Dispense: 18 g; Refill: 11    Return in about 1 week (around 6/15/2023) for Follow up.     History of Present Illness   This 68 year old woman comes in for follow-up of breathing difficulty and cough.  Last visit she was in atrial fibrillation with rapid ventricular response.  She has underlying emphysema and COPD and smoking history and therefore I placed her on diltiazem as well as Eliquis.  She has been taking both and feeling a little bit  "better but still quite short of breath with exertion.  Still with occasional productive cough.  She is unable to work because of breathing difficulties.  She had an echocardiogram which showed systolic dysfunction with an ejection fraction of 2025% of the left ventricle.       Physical Exam   General Appearance:   No acute distress    /75 (BP Location: Left arm, Patient Position: Sitting, Cuff Size: Adult Regular)   Pulse 86   Temp 97.7  F (36.5  C) (Oral)   Resp 16   Ht 1.676 m (5' 6\")   Wt 52.6 kg (116 lb)   LMP  (LMP Unknown)   SpO2 95%   Breastfeeding No   BMI 18.72 kg/m      Heart rate is in the 80s to 100.  Irregularly irregular.  Lungs are generally clear to auscultation bilaterally, lung sounds are distant.  She has no lower extremity edema.     Additional Information   Current Outpatient Medications   Medication Sig Dispense Refill     albuterol (PROAIR HFA/PROVENTIL HFA/VENTOLIN HFA) 108 (90 Base) MCG/ACT inhaler Inhale 2 puffs into the lungs every 6 hours as needed for shortness of breath, wheezing or cough 18 g 11     apixaban ANTICOAGULANT (ELIQUIS ANTICOAGULANT) 5 MG tablet Take 1 tablet (5 mg) by mouth 2 times daily 180 tablet 3     atorvastatin (LIPITOR) 20 MG tablet Take 1 tablet (20 mg) by mouth daily 90 tablet 3     cholecalciferol, vitamin D3, (VITAMIN D3) 1,000 unit capsule [CHOLECALCIFEROL, VITAMIN D3, (VITAMIN D3) 1,000 UNIT CAPSULE] Take 1 capsule (1,000 Units total) by mouth daily.  0     diclofenac sodium (VOLTAREN) 1 % Gel [DICLOFENAC SODIUM (VOLTAREN) 1 % GEL] Apply 2 g topically 4 (four) times a day. 500 g 11     fluticasone propionate (FLONASE) 50 mcg/actuation nasal spray [FLUTICASONE PROPIONATE (FLONASE) 50 MCG/ACTUATION NASAL SPRAY] Shake liquid and use 1 spray in each nostril daily. 16 g 12     furosemide (LASIX) 20 MG tablet Take 1 tablet (20 mg) by mouth daily for 5 days 5 tablet 0     losartan (COZAAR) 50 MG tablet Take 1 tablet (50 mg) by mouth daily 90 tablet 4 "     metoprolol succinate ER (TOPROL XL) 50 MG 24 hr tablet Take 1 tablet (50 mg) by mouth daily 90 tablet 4     guaiFENesin (MUCINEX) 600 MG 12 hr tablet Take 2 tablets (1,200 mg) by mouth 2 times daily as needed for congestion (Patient not taking: Reported on 6/8/2023) 20 tablet 0       Time: This is an acute illness that poses threat to life or bodily function.  I reviewed the labs, imaging studies, echocardiogram.  I have interpreted her echocardiogram.  I have interpreted her EKG.     Tomasz Beltran MD  Answers for HPI/ROS submitted by the patient on 6/8/2023  What is the reason for your visit today? : f/up heart issues and cough  How many servings of fruits and vegetables do you eat daily?: 0-1  On average, how many sweetened beverages do you drink each day (Examples: soda, juice, sweet tea, etc.  Do NOT count diet or artificially sweetened beverages)?: 0  How many minutes a day do you exercise enough to make your heart beat faster?: 9 or less  How many days a week do you exercise enough to make your heart beat faster?: 3 or less  How many days per week do you miss taking your medication?: 0

## 2023-06-15 ENCOUNTER — OFFICE VISIT (OUTPATIENT)
Dept: INTERNAL MEDICINE | Facility: CLINIC | Age: 68
End: 2023-06-15
Payer: COMMERCIAL

## 2023-06-15 VITALS
RESPIRATION RATE: 15 BRPM | DIASTOLIC BLOOD PRESSURE: 62 MMHG | WEIGHT: 114.5 LBS | HEIGHT: 66 IN | OXYGEN SATURATION: 94 % | SYSTOLIC BLOOD PRESSURE: 104 MMHG | BODY MASS INDEX: 18.4 KG/M2 | TEMPERATURE: 97.6 F | HEART RATE: 106 BPM

## 2023-06-15 DIAGNOSIS — I48.91 ATRIAL FIBRILLATION WITH RVR (H): ICD-10-CM

## 2023-06-15 DIAGNOSIS — I50.21 ACUTE SYSTOLIC CONGESTIVE HEART FAILURE (H): Primary | ICD-10-CM

## 2023-06-15 DIAGNOSIS — F17.219 CIGARETTE NICOTINE DEPENDENCE WITH NICOTINE-INDUCED DISORDER: ICD-10-CM

## 2023-06-15 PROBLEM — R42 VERTIGO: Status: RESOLVED | Noted: 2020-09-11 | Resolved: 2023-06-15

## 2023-06-15 PROCEDURE — 99214 OFFICE O/P EST MOD 30 MIN: CPT | Performed by: INTERNAL MEDICINE

## 2023-06-15 PROCEDURE — T1013 SIGN LANG/ORAL INTERPRETER: HCPCS | Mod: U3 | Performed by: INTERNAL MEDICINE

## 2023-06-15 RX ORDER — METOPROLOL SUCCINATE 50 MG/1
100 TABLET, EXTENDED RELEASE ORAL DAILY
Qty: 90 TABLET | Refills: 4
Start: 2023-06-15 | End: 2023-06-23

## 2023-06-15 ASSESSMENT — PAIN SCALES - GENERAL: PAINLEVEL: MODERATE PAIN (4)

## 2023-06-15 NOTE — PROGRESS NOTES
"  Office Visit - Follow Up   Verena Lagunas   68 year old female    Date of Visit: 6/15/2023    Chief Complaint   Patient presents with     Recheck Medication     Furosemide 5 anita supply was taken but unable to see cardiologist until July     Shortness of Breath     Follow up        Assessment and Plan   1. Acute systolic congestive heart failure (H)  Her cough is resolved likely related to use of furosemide.  I am going to have her stop this and we will going to focus on beta-blocker titration.  She will increase metoprolol to 100 mg daily and continue with losartan 50 mg daily.  Has appointment in July 20 with cardiology    2. Atrial fibrillation with RVR (H)  Increase metoprolol continue Eliquis  - metoprolol succinate ER (TOPROL XL) 50 MG 24 hr tablet; Take 2 tablets (100 mg) by mouth daily  Dispense: 90 tablet; Refill: 4    3. Cigarette nicotine dependence with nicotine-induced disorder  She has not been smoking now for 3 weeks, excellent    Return in about 1 week (around 6/22/2023) for Follow up.     History of Present Illness   This 68 year old woman comes in for follow-up.  Last visit I had her switch from diltiazem to metoprolol.  This was after we found she has reduced ejection fraction.  I had her take 5 days of furosemide.  Her cough is better but she is profoundly short of breath with any activity.  She continues to not smoke x3 weeks.       Physical Exam   General Appearance:   No acute distress    /62 (BP Location: Left arm, Patient Position: Sitting, Cuff Size: Adult Regular)   Pulse 106   Temp 97.6  F (36.4  C)   Resp 15   Ht 1.676 m (5' 6\")   Wt 51.9 kg (114 lb 8 oz)   LMP  (LMP Unknown)   SpO2 94%   Breastfeeding No   BMI 18.48 kg/m      Heart rate is irregular and tachycardic, lungs are generally clear although lung sounds are distant     Additional Information   Current Outpatient Medications   Medication Sig Dispense Refill     albuterol (PROAIR HFA/PROVENTIL HFA/VENTOLIN HFA) " 108 (90 Base) MCG/ACT inhaler Inhale 2 puffs into the lungs every 6 hours as needed for shortness of breath, wheezing or cough 18 g 11     apixaban ANTICOAGULANT (ELIQUIS ANTICOAGULANT) 5 MG tablet Take 1 tablet (5 mg) by mouth 2 times daily 180 tablet 3     atorvastatin (LIPITOR) 20 MG tablet Take 1 tablet (20 mg) by mouth daily 90 tablet 3     cholecalciferol, vitamin D3, (VITAMIN D3) 1,000 unit capsule [CHOLECALCIFEROL, VITAMIN D3, (VITAMIN D3) 1,000 UNIT CAPSULE] Take 1 capsule (1,000 Units total) by mouth daily.  0     losartan (COZAAR) 50 MG tablet Take 1 tablet (50 mg) by mouth daily 90 tablet 4     metoprolol succinate ER (TOPROL XL) 50 MG 24 hr tablet Take 2 tablets (100 mg) by mouth daily 90 tablet 4     furosemide (LASIX) 20 MG tablet Take 1 tablet (20 mg) by mouth daily for 5 days 5 tablet 0       Time:      Tomasz Beltran MD  Answers for HPI/ROS submitted by the patient on 6/8/2023  What is the reason for your visit today? : f/up heart issues and cough  How many servings of fruits and vegetables do you eat daily?: 0-1  On average, how many sweetened beverages do you drink each day (Examples: soda, juice, sweet tea, etc.  Do NOT count diet or artificially sweetened beverages)?: 0  How many minutes a day do you exercise enough to make your heart beat faster?: 9 or less  How many days a week do you exercise enough to make your heart beat faster?: 3 or less  How many days per week do you miss taking your medication?: 0

## 2023-06-23 ENCOUNTER — OFFICE VISIT (OUTPATIENT)
Dept: INTERNAL MEDICINE | Facility: CLINIC | Age: 68
End: 2023-06-23
Payer: COMMERCIAL

## 2023-06-23 VITALS
WEIGHT: 118.9 LBS | RESPIRATION RATE: 14 BRPM | DIASTOLIC BLOOD PRESSURE: 64 MMHG | OXYGEN SATURATION: 96 % | SYSTOLIC BLOOD PRESSURE: 118 MMHG | TEMPERATURE: 98 F | HEIGHT: 66 IN | BODY MASS INDEX: 19.11 KG/M2 | HEART RATE: 100 BPM

## 2023-06-23 DIAGNOSIS — I50.21 ACUTE SYSTOLIC CONGESTIVE HEART FAILURE (H): Primary | ICD-10-CM

## 2023-06-23 DIAGNOSIS — J41.0 SIMPLE CHRONIC BRONCHITIS (H): ICD-10-CM

## 2023-06-23 DIAGNOSIS — I48.91 ATRIAL FIBRILLATION WITH RVR (H): ICD-10-CM

## 2023-06-23 DIAGNOSIS — E78.2 MIXED HYPERLIPIDEMIA: ICD-10-CM

## 2023-06-23 PROCEDURE — 99214 OFFICE O/P EST MOD 30 MIN: CPT | Performed by: INTERNAL MEDICINE

## 2023-06-23 RX ORDER — METOPROLOL SUCCINATE 50 MG/1
150 TABLET, EXTENDED RELEASE ORAL DAILY
Qty: 90 TABLET | Refills: 4
Start: 2023-06-23 | End: 2023-06-30

## 2023-06-23 RX ORDER — FUROSEMIDE 20 MG
20 TABLET ORAL DAILY
Qty: 90 TABLET | Refills: 1 | Status: SHIPPED | OUTPATIENT
Start: 2023-06-23 | End: 2023-12-18

## 2023-06-23 ASSESSMENT — PAIN SCALES - GENERAL: PAINLEVEL: NO PAIN (0)

## 2023-06-23 NOTE — PROGRESS NOTES
"  Office Visit - Follow Up   Verena Lagunas   68 year old female    Date of Visit: 6/23/2023    Chief Complaint   Patient presents with     Recheck Medication     Follow Up     Sleep Problem        Assessment and Plan   1. Acute systolic congestive heart failure (H)  She has gained 4 pounds in the last week, some might be caloric if she stop smoking.  She has some swelling in her legs and remains symptomatic from heart failure.  I think she needs to restart furosemide.  Her heart rate is still rapid and I would like to increase metoprolol to 150 mg daily and have her follow-up with me in 1 week.  Continue losartan at 50 mg daily.  - furosemide (LASIX) 20 MG tablet; Take 1 tablet (20 mg) by mouth daily  Dispense: 90 tablet; Refill: 1    2. Atrial fibrillation with RVR (H)  Has an appointment with electrophysiology July 6, 2023  - metoprolol succinate ER (TOPROL XL) 50 MG 24 hr tablet; Take 3 tablets (150 mg) by mouth daily  Dispense: 90 tablet; Refill: 4    3. Mixed hyperlipidemia    4. Simple chronic bronchitis (H)    Return in about 1 week (around 6/30/2023) for Follow up.     History of Present Illness   This 68 year old comes in for follow-up atrial fibrillation and heart failure.  She continues to have symptoms of shortness of breath, cough improved.  Has noticed a little bit of swelling in her legs.  Continues to not smoke.       Physical Exam   General Appearance:   No acute distress    /64 (BP Location: Left arm, Patient Position: Sitting, Cuff Size: Adult Regular)   Pulse 100   Temp 98  F (36.7  C)   Resp 14   Ht 1.676 m (5' 6\")   Wt 53.9 kg (118 lb 14.4 oz)   LMP  (LMP Unknown)   SpO2 96%   BMI 19.19 kg/m      Heart rate is tachycardic in the 120s, rhythm is irregular, lungs with distant lung sounds and she does have a small amount of edema in the bilateral lower extremities     Additional Information   Current Outpatient Medications   Medication Sig Dispense Refill     albuterol (PROAIR " HFA/PROVENTIL HFA/VENTOLIN HFA) 108 (90 Base) MCG/ACT inhaler Inhale 2 puffs into the lungs every 6 hours as needed for shortness of breath, wheezing or cough 18 g 11     apixaban ANTICOAGULANT (ELIQUIS ANTICOAGULANT) 5 MG tablet Take 1 tablet (5 mg) by mouth 2 times daily 180 tablet 3     atorvastatin (LIPITOR) 20 MG tablet Take 1 tablet (20 mg) by mouth daily 90 tablet 3     cholecalciferol, vitamin D3, (VITAMIN D3) 1,000 unit capsule [CHOLECALCIFEROL, VITAMIN D3, (VITAMIN D3) 1,000 UNIT CAPSULE] Take 1 capsule (1,000 Units total) by mouth daily.  0     furosemide (LASIX) 20 MG tablet Take 1 tablet (20 mg) by mouth daily 90 tablet 1     losartan (COZAAR) 50 MG tablet Take 1 tablet (50 mg) by mouth daily 90 tablet 4     metoprolol succinate ER (TOPROL XL) 50 MG 24 hr tablet Take 3 tablets (150 mg) by mouth daily 90 tablet 4       Time:      Tomasz Beltran MD  Answers for HPI/ROS submitted by the patient on 6/23/2023  What is the reason for your visit today? : sleep  How many servings of fruits and vegetables do you eat daily?: 0-1  On average, how many sweetened beverages do you drink each day (Examples: soda, juice, sweet tea, etc.  Do NOT count diet or artificially sweetened beverages)?: 0  How many minutes a day do you exercise enough to make your heart beat faster?: 9 or less  How many days a week do you exercise enough to make your heart beat faster?: 3 or less  How many days per week do you miss taking your medication?: 0

## 2023-06-29 ENCOUNTER — TELEPHONE (OUTPATIENT)
Dept: INTERNAL MEDICINE | Facility: CLINIC | Age: 68
End: 2023-06-29

## 2023-06-29 NOTE — TELEPHONE ENCOUNTER
June 29, 2023    [place] Attending Physician Statement - Initial was received via fax for Dr. Beltran to sign.  Patient label was attached to paperwork and placed in provider's inbox to be signed.    Syeda Melendez

## 2023-06-30 ENCOUNTER — OFFICE VISIT (OUTPATIENT)
Dept: INTERNAL MEDICINE | Facility: CLINIC | Age: 68
End: 2023-06-30
Payer: COMMERCIAL

## 2023-06-30 VITALS
SYSTOLIC BLOOD PRESSURE: 127 MMHG | TEMPERATURE: 99 F | WEIGHT: 117.5 LBS | OXYGEN SATURATION: 98 % | HEIGHT: 66 IN | RESPIRATION RATE: 14 BRPM | BODY MASS INDEX: 18.88 KG/M2 | HEART RATE: 106 BPM | DIASTOLIC BLOOD PRESSURE: 63 MMHG

## 2023-06-30 DIAGNOSIS — I10 ESSENTIAL HYPERTENSION: ICD-10-CM

## 2023-06-30 DIAGNOSIS — J41.0 SIMPLE CHRONIC BRONCHITIS (H): ICD-10-CM

## 2023-06-30 DIAGNOSIS — E78.2 MIXED HYPERLIPIDEMIA: ICD-10-CM

## 2023-06-30 DIAGNOSIS — I50.21 ACUTE SYSTOLIC CONGESTIVE HEART FAILURE (H): ICD-10-CM

## 2023-06-30 DIAGNOSIS — I48.91 ATRIAL FIBRILLATION WITH RVR (H): Primary | ICD-10-CM

## 2023-06-30 LAB
ANION GAP SERPL CALCULATED.3IONS-SCNC: 13 MMOL/L (ref 7–15)
BUN SERPL-MCNC: 21 MG/DL (ref 8–23)
CALCIUM SERPL-MCNC: 9.8 MG/DL (ref 8.8–10.2)
CHLORIDE SERPL-SCNC: 110 MMOL/L (ref 98–107)
CREAT SERPL-MCNC: 1.09 MG/DL (ref 0.51–0.95)
DEPRECATED HCO3 PLAS-SCNC: 29 MMOL/L (ref 22–29)
GFR SERPL CREATININE-BSD FRML MDRD: 55 ML/MIN/1.73M2
GLUCOSE SERPL-MCNC: 68 MG/DL (ref 70–99)
POTASSIUM SERPL-SCNC: 5 MMOL/L (ref 3.4–5.3)
SODIUM SERPL-SCNC: 152 MMOL/L (ref 136–145)

## 2023-06-30 PROCEDURE — T1013 SIGN LANG/ORAL INTERPRETER: HCPCS | Mod: U3

## 2023-06-30 PROCEDURE — 36415 COLL VENOUS BLD VENIPUNCTURE: CPT | Performed by: INTERNAL MEDICINE

## 2023-06-30 PROCEDURE — 99214 OFFICE O/P EST MOD 30 MIN: CPT | Performed by: INTERNAL MEDICINE

## 2023-06-30 PROCEDURE — 80048 BASIC METABOLIC PNL TOTAL CA: CPT | Performed by: INTERNAL MEDICINE

## 2023-06-30 RX ORDER — METOPROLOL SUCCINATE 200 MG/1
200 TABLET, EXTENDED RELEASE ORAL DAILY
Qty: 90 TABLET | Refills: 4 | Status: ON HOLD | OUTPATIENT
Start: 2023-06-30 | End: 2023-07-17

## 2023-06-30 ASSESSMENT — PAIN SCALES - GENERAL: PAINLEVEL: NO PAIN (0)

## 2023-06-30 NOTE — PROGRESS NOTES
"  Office Visit - Follow Up   Verena Lagunas   68 year old female    Date of Visit: 6/30/2023    Chief Complaint   Patient presents with     Follow Up     Follow up for heart condition and medications.  Please verify the dosages. Feeling short of breath and that is causing anxiety. Patient has had a cough.        Assessment and Plan   1. Atrial fibrillation with RVR (H)  Increase metoprolol to 200 mg daily, continue with Eliquis and she will be seeing cardiology  - metoprolol succinate ER (TOPROL XL) 200 MG 24 hr tablet; Take 1 tablet (200 mg) by mouth daily  Dispense: 90 tablet; Refill: 4  - Basic metabolic panel  (Ca, Cl, CO2, Creat, Gluc, K, Na, BUN); Future  - Basic metabolic panel  (Ca, Cl, CO2, Creat, Gluc, K, Na, BUN)    2. Acute systolic congestive heart failure (H)  New diagnosis, needs further evaluation will be established with cardiology.  Titrating beta-blocker, continue losartan.  Tolerating furosemide 20 mg daily which she will continue.  Likely will need treatment for atrial fibrillation and may need ischemic evaluation as well.    3. Mixed hyperlipidemia  Continue with atorvastatin    4. Hypertension    5. Simple chronic bronchitis (H)  She is no longer smoking, which is excellent continue with inhaler as needed    Return in about 2 weeks (around 7/14/2023) for Follow up.     History of Present Illness   This 68 year old woman comes in for follow-up.  She continues to feel short of breath.  Unable to work at this point has cardiology visit next week.       Physical Exam   General Appearance:   No acute distress    /63   Pulse 106   Temp 99  F (37.2  C) (Tympanic)   Resp 14   Ht 1.676 m (5' 6\")   Wt 53.3 kg (117 lb 8 oz)   LMP  (LMP Unknown)   SpO2 98%   BMI 18.96 kg/m      Heart rate is tachycardic and rhythm is irregular, lungs with distant lung sounds, minimal edema     Additional Information   Current Outpatient Medications   Medication Sig Dispense Refill     albuterol (PROAIR " HFA/PROVENTIL HFA/VENTOLIN HFA) 108 (90 Base) MCG/ACT inhaler Inhale 2 puffs into the lungs every 6 hours as needed for shortness of breath, wheezing or cough 18 g 11     apixaban ANTICOAGULANT (ELIQUIS ANTICOAGULANT) 5 MG tablet Take 1 tablet (5 mg) by mouth 2 times daily 180 tablet 3     atorvastatin (LIPITOR) 20 MG tablet Take 1 tablet (20 mg) by mouth daily 90 tablet 3     cholecalciferol, vitamin D3, (VITAMIN D3) 1,000 unit capsule [CHOLECALCIFEROL, VITAMIN D3, (VITAMIN D3) 1,000 UNIT CAPSULE] Take 1 capsule (1,000 Units total) by mouth daily.  0     furosemide (LASIX) 20 MG tablet Take 1 tablet (20 mg) by mouth daily 90 tablet 1     losartan (COZAAR) 50 MG tablet Take 1 tablet (50 mg) by mouth daily 90 tablet 4     metoprolol succinate ER (TOPROL XL) 200 MG 24 hr tablet Take 1 tablet (200 mg) by mouth daily 90 tablet 4       Time:      Tomasz Beltran MD  Answers for HPI/ROS submitted by the patient on 6/23/2023  What is the reason for your visit today? : sleep  How many servings of fruits and vegetables do you eat daily?: 0-1  On average, how many sweetened beverages do you drink each day (Examples: soda, juice, sweet tea, etc.  Do NOT count diet or artificially sweetened beverages)?: 0  How many minutes a day do you exercise enough to make your heart beat faster?: 9 or less  How many days a week do you exercise enough to make your heart beat faster?: 3 or less  How many days per week do you miss taking your medication?: 0

## 2023-07-06 ENCOUNTER — OFFICE VISIT (OUTPATIENT)
Dept: CARDIOLOGY | Facility: CLINIC | Age: 68
End: 2023-07-06
Attending: INTERNAL MEDICINE
Payer: COMMERCIAL

## 2023-07-06 VITALS
SYSTOLIC BLOOD PRESSURE: 108 MMHG | BODY MASS INDEX: 19.08 KG/M2 | HEART RATE: 100 BPM | OXYGEN SATURATION: 91 % | WEIGHT: 118.7 LBS | HEIGHT: 66 IN | RESPIRATION RATE: 16 BRPM | DIASTOLIC BLOOD PRESSURE: 70 MMHG

## 2023-07-06 DIAGNOSIS — I48.91 ATRIAL FIBRILLATION WITH RVR (H): ICD-10-CM

## 2023-07-06 DIAGNOSIS — I50.21 ACUTE SYSTOLIC CONGESTIVE HEART FAILURE (H): Primary | ICD-10-CM

## 2023-07-06 PROCEDURE — T1013 SIGN LANG/ORAL INTERPRETER: HCPCS | Mod: U3

## 2023-07-06 PROCEDURE — 99205 OFFICE O/P NEW HI 60 MIN: CPT | Performed by: INTERNAL MEDICINE

## 2023-07-06 RX ORDER — AMIODARONE HYDROCHLORIDE 200 MG/1
TABLET ORAL
Qty: 90 TABLET | Refills: 3 | Status: SHIPPED | OUTPATIENT
Start: 2023-07-06 | End: 2023-07-07

## 2023-07-06 NOTE — PATIENT INSTRUCTIONS
[facility]  Cardiac Electrophysiology  [address] [facility]  [place] [address]   Office: [phone]  Fax: [phone]       Thank you for seeing us in clinic today - it is a pleasure to be a part of your care team.  Below is a summary of our plan from today's visit.       You have persistent atrial fibrillation which has been associated with acute heart failure (left ventricular ejection fraction 20-25%, normal 55-65%).  You are presently being managed with metoprolol XL and apixaban.  We reviewed physiology and management options including antiarrhythmic drug therapy, cardioversion, catheter ablation.  We will plan for the following:  - start amiodarone 400mg twice daily for 10 days, then reduce dose to 200mg daily thereafter.  This is a short term strategy.  We reviewed potential amiodarone toxicities.  - our office will work with you to arrange for a cardioversion after at least 7 days on amiodarone  - our office will work with you to coordinate atrial fibrillation ablation  - continue metoprolol XL 200mg daily  - continue apixaban 5mg twice daily     Please do not hesitate to be in touch with our office at [phone] with any questions that may arise.       Thank you for trusting us with your care,    Vandana Briscoe MD  Clinical Cardiac Electrophysiology  [facility]  [address] [facility]  [place] [address]   Office: [phone]  Fax: [phone]            ATRIAL FIBRILLATION: Patient Information    What is atrial fibrillation?  Atrial fibrillation (AF, A-fib) is a common heart rhythm problem (arrhythmia) occurring within the upper chambers of the heart (the atria).  In normal rhythm, the upper and lower chambers of the heart are electrically driven to contract in a coordinated sequence.  In atrial fibrillation, the atria lose their ability to contract because of rapid and chaotic electrical activity.  The lower chambers of the heart  (the ventricles) continue to pump blood throughout the body, though with irregular and often faster rate due to the chaotic activity within the atria.        How do I know if I have atrial fibrillation?   Some people may feel their heart beating faster, harder, or irregularly while in atrial fibrillation.  Others may be lightheaded, fatigued, feel weak or tired or become more short of breath especially with activities.  Some patients have no symptoms at all.  Atrial fibrillation may be found due to an irregular pulse or on an electrocardiogram (ECG). Atrial fibrillation can start and stop on its own, and episodes can last from seconds to several months.      How common is atrial fibrillation?   An estimated 3-6 million people in the [place] have atrial fibrillation.  Atrial fibrillation is a common heart rhythm problem for older persons, affecting as estimated 12-15% of people over the age of 65 years of age.    What causes atrial fibrillation?   Age is the most important risk factor for atrial fibrillation.  Atrial fibrillation is more common in people with other heart disease, high blood pressure, diabetes, obesity, sleep apnea and in people who regularly consume alcohol.  Surgery, lung disease, or thyroid problems can lead to atrial fibrillation.  Atrial fibrillation has multiple possible causes, and in most cases a single cause cannot be found.  Atrial fibrillation is a progressive condition, usually starting with at an early stage with short and infrequent episodes.  In later stages of disease, more frequent and longer lasting episodes of atrial fibrillation occur, ultimately culminating in episodes which do not spontaneously terminate.  Generally, more enlargement and scarring within the upper chambers of the heart is observed as atrial fibrillation progresses from early to late-stage disease.    How is atrial fibrillation diagnosed and evaluated?    Because of its start-stop nature, atrial fibrillation  can be challenging to diagnose.  Atrial fibrillation is most commonly diagnosed via cardiac rhythm recordings - either an ECG or wearable cardiac rhythm monitor.  For patients with pacemakers, defibrillators or implantable loop recorders, atrial fibrillation may be recorded via these devices.  Recently, commercially available devices (eg. Apple Watch, Unfold device, certain [employer] devices, others) can allow patients to take 30 second cardiac rhythm recordings which may document atrial fibrillation.  Once atrial fibrillation is diagnosed, additional tests include blood tests and an echocardiogram.  The echocardiogram uses ultrasound to look at your heart to assess your cardiac function and evaluate for other heart disease.  Additional evaluation may include CT or MRI studies.    Is atrial fibrillation dangerous?   Atrial fibrillation is not usually a life-threatening arrhythmia.  The most serious consequences of atrial fibrillation including stroke and worsening of overall cardiac function.  While in atrial fibrillation, the upper cardiac chambers do not contract normally, resulting in slower blood flow and increased risk of clot formation.  If this blood clot becomes detached from the heart a stroke can occur.  Unfortunately, stroke can be the first sign of atrial fibrillation for some people.  With a stroke, you may notice abnormal sensation, weakness on one side of the body or face, changes in your vision or speech.  If you have any of these signs, you should contact EMS and be evaluated in an emergency room as soon as possible.      How is atrial fibrillation treated?     Several treatment options exist for suppressing atrial fibrillation - however, it is not an easily curable arrhythmia.  The first goal in managing atrial fibrillation is to minimize stroke risk.  The second goal is to improve symptoms associated with atrial fibrillation.  Finally, in patients with reduced cardiac function, maintaining normal  rhythm can help improve cardiac function.      Blood thinners are used to reduce the risk of stroke in patients with high estimated stroke risk related to atrial fibrillation.  For patients at higher risk of bleeding related to blood thinner use, implantable devices may be an option to reduce stroke risk without the need for long term blood thinner use.      Atrial fibrillation can be managed via two strategies: rate control and rhythm control.  In a rate control strategy, continued atrial fibrillation is accepted and medications (eg. beta-blockers or calcium channel blockers) are used to control the lower chamber rate.  In a rhythm control strategy, anti-arrhythmic medications or catheter ablation are used to maintain normal cardiac rhythm and slow disease progression by suppressing atrial fibrillation.  A procedure called a cardioversion, in which an electric shock is delivered through patches placed on the chest wall while under deep sedation, can be performed to temporarily restore normal cardiac rhythm, though does not address the chance of atrial fibrillation recurrence.  Treatments are more effective for earlier rather than later stage atrial fibrillation.  Lifestyle modifications (maintaining a healthy weight, aerobic exercise, diagnosing and treating sleep apnea, and minimizing alcohol intake) are important elements of atrial fibrillation rhythm control.     What is catheter ablation for atrial fibrillation?  Cardiac catheter ablation is a commonly performed, minimally invasive procedure performed by a cardiac electrophysiologist to treat many different cardiac rhythm abnormalities.  During catheter ablation, long, thin, flexible tubes are advanced into the heart via small sheaths inserted into the femoral veins and thermal energy (either heating or cooling) is applied within the heart to disrupt abnormal electrical activity.  Atrial fibrillation ablation is performed under general anesthesia, with  procedures generally taking approximately 2-3 hours.  Patients are typically observed for 3-5 hours after the ablation, and in most cases can be discharged home the same day.  Atrial fibrillation ablation is associated with better outcomes (mortality, cardiovascular hospitalizations, atrial arrhythmia recurrences) compared to antiarrhythmic drug therapy.  However, atrial fibrillation recurrences are not uncommon, and repeat catheter ablation may be offered.  Your electrophysiology team can review atrial fibrillation ablation, anticipated success rates, risks, and recovery expectations with you.    What are anti-arrhythmic medications?  Anti-arrhythmic medications are specialized drugs which alter cardiac electrical functioning to suppress arrhythmias.  There are several anti-arrhythmic medications available, each with its own success rate and side effects.  Some anti-arrhythmic medications are less effective though safer to use, others are more effective though have serious potential toxicities.  Atrial fibrillation recurrences are common and may require dose adjustment or change in antiarrhythmic therapy.  Your electrophysiology team will carefully consider which medication would be the best and safest for your particular case.      Can I live a normal life?    The goal of atrial fibrillation management is for patients to live normal lives without being limited by symptoms related to atrial fibrillation.    Are any additional educational resources available?  There are a number of excellent atrial fibrillation education resources available to you online.  A few options you may wish to review include:  hrsonline.org/guide-atrial-fibrillation  afibmatters.org  getsmartaboutafib.com  stopaf.com    What comes next?    Consider your management options and let us know how we can help in your decision process.  Please take medications as they have been prescribed.  You should also get any tests that may have been ordered  for you.      When to Call Your Doctor or seek emergency care:  Call your doctor or seek emergency care if you have any significant changes with the following:  Weakness  Dizziness  Fainting  Fatigue  Shortness of breath  Chest pain with increased activity  If you are concerned that your heart rate is too fast or too slow  Bleeding that does not stop in 10 minutes  Coughing or throwing up blood  Bloody diarrhea or bleeding hemorrhoids  Dark-colored urine or black stool  Allergic reactions:  Rash  Itching  Swelling  Trouble breathing or swallowing      Please call the [facility] at [phone] if you have concerns about your symptoms, your medicines, or your follow-up appointments.

## 2023-07-06 NOTE — PROGRESS NOTES
[facility]  Cardiac Electrophysiology  [address]  [place] [address]   Office: [phone]  Fax: [phone]     Cardiac Electrophysiology Consultation    Patient: Verena Lagunas   : 1955     Referring Provider: Tomasz Beltran MD  Primary Care Provider: Tomasz Beltran MD    CHIEF COMPLAINT/REASON FOR CONSULTATION  Persistent atrial fibrillation  Acute systolic heart failure (LVEF 20-25%, LVIDd 5.7cm by 2023 TTE)    Assessment/Recommendations   Verena Lagunas is a 68 year old female with persistent atrial fibrillation, acute systolic heart failure (LVEF 20-25%, LVIDd 5.7cm by 2023 TTE), HTN, COPD, deaf referred by Dr. Beltran for consultation regarding atrial fibrillation and heart failure.    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  We discussed atrial fibrillation ablation procedures, anticipated success rates, the potential need for re-do ablation vs addition of anti-arrhythmic drugs, procedural risks (including groin bleeding, tamponade, phrenic or esophageal injury, stroke, pulmonary vein stenosis) and recovery expectations.  She would prefer amiodarone, DCCV and as a bridge to ablation  She is deaf - she would prefer text or email for communication.  - amiodarone 400mg twice daily for 10 days, 200mg daily thereafter as short term strategy.  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 Beltran for consultation regarding atrial fibrillation and heart failure.    Mrs. Lagunas notes progressive dyspnea since around 4-5/2023.  She was found to be in atrial fibrillation with rapid ventricular rates 5/2023 with note of 6/7/2023 TTE showing LVEF 20-25%.      She notes some intermittent lower extremity edema.  denies chest pain, syncope.  She is working on stopping smoking.         Physical Examination  Review of Systems   VITALS: /70 (BP Location: Left arm, Patient Position: Sitting, Cuff Size: Adult Regular)   Pulse 100   Resp 16   Ht 1.676 m (5' 6\")   Wt 53.8 kg (118 lb 11.2 oz)   LMP  (LMP Unknown)   SpO2 91%   BMI 19.16 kg/m    Wt Readings from Last 3 Encounters:   06/30/23 53.3 kg (117 lb 8 oz)   06/23/23 53.9 kg (118 lb 14.4 oz)   06/15/23 51.9 kg (114 lb 8 oz)     CONSTITUTIONAL: well nourished, comfortable, no distress  EYES:  Conjunctivae pink, sclerae clear.    E/N/T:  Oral mucosa pink  RESPIRATORY:  Respiratory effort is normal  CARDIOVASCULAR:  Irregular, tachycardic, normal S1 and S2  GASTROINTESTINAL:  Abdomen without masses or tenderness  EXTREMITIES:  No clubbing " or cyanosis.    MUSCULOSKELETAL:  Overall grossly normal muscle strength  SKIN:  Overall, skin warm and dry, no lesions.  NEURO/PSYCH:  Oriented x 3 with normal affect.   Constitutional:  No weight loss or loss of appetite    Eyes:  No difficulty with vision, no double vision, no dry eyes  ENT:  No sore throat, difficulty swallowing; changes in hearing or tinnitus  Cardiovascular: As detailed above  Respiratory:  No cough  Musculoskeletal  No joint pain, muscle aches  Neurologic:  No syncope, lightheadedness, fainting spells   Hematologic: No easy bruising, excessive bleeding tendency   Gastrointestinal:  No jaundice, abdominal pain or abdominal bloating  Genitourinary: No changes in urinary habits, no trouble urinating    Psychiatric: No anxiety or depression      Medical History  Surgical History   Past Medical History:   Diagnosis Date     Allergic rhinitis      Hypertension      Tobacco abuse     Past Surgical History:   Procedure Laterality Date     OVARIAN CYST REMOVAL Right 1970         Family History Social History   Family History   Problem Relation Age of Onset     Colon Cancer Mother 79             Lung Cancer Father 50             No Known Problems Sister      Other - See Comments Brother         6 brothers and 1 sister     Cancer Brother      Breast Cancer Maternal Aunt 75        Social History     Tobacco Use     Smoking status: Former     Packs/day: 0.50     Types: Cigarettes     Quit date: 2023     Years since quittin.1     Passive exposure: Never     Smokeless tobacco: Never   Vaping Use     Vaping Use: Never used   Substance Use Topics     Alcohol use: No     Comment: Alcoholic Drinks/day: rare     Drug use: No         Medications  Allergies     Current Outpatient Medications:      albuterol (PROAIR HFA/PROVENTIL HFA/VENTOLIN HFA) 108 (90 Base) MCG/ACT inhaler, Inhale 2 puffs into the lungs every 6 hours as needed for shortness of breath, wheezing or cough, Disp: 18 g, Rfl:  11     apixaban ANTICOAGULANT (ELIQUIS ANTICOAGULANT) 5 MG tablet, Take 1 tablet (5 mg) by mouth 2 times daily, Disp: 180 tablet, Rfl: 3     atorvastatin (LIPITOR) 20 MG tablet, Take 1 tablet (20 mg) by mouth daily, Disp: 90 tablet, Rfl: 3     cholecalciferol, vitamin D3, (VITAMIN D3) 1,000 unit capsule, [CHOLECALCIFEROL, VITAMIN D3, (VITAMIN D3) 1,000 UNIT CAPSULE] Take 1 capsule (1,000 Units total) by mouth daily., Disp: , Rfl: 0     furosemide (LASIX) 20 MG tablet, Take 1 tablet (20 mg) by mouth daily, Disp: 90 tablet, Rfl: 1     losartan (COZAAR) 50 MG tablet, Take 1 tablet (50 mg) by mouth daily, Disp: 90 tablet, Rfl: 4     metoprolol succinate ER (TOPROL XL) 200 MG 24 hr tablet, Take 1 tablet (200 mg) by mouth daily, Disp: 90 tablet, Rfl: 4     Allergies   Allergen Reactions     Cat Hair Std Allergenic Ext [Cat Hair Extract] Unknown     Penicillins Shortness Of Breath          Lab Results    Chemistry CBC Cardiac Enzymes/BNP/TSH/INR   Recent Labs   Lab Test 06/30/23  1238   *   POTASSIUM 5.0   CHLORIDE 110*   CO2 29   GLC 68*   BUN 21.0   CR 1.09*   GFRESTIMATED 55*   MYRANDA 9.8     Recent Labs   Lab Test 06/30/23  1238 06/01/23  1037 10/04/22  1214   CR 1.09* 0.74 0.71          Recent Labs   Lab Test 06/01/23  1037   WBC 14.0*   HGB 13.7   HCT 43.1   MCV 87        Recent Labs   Lab Test 06/01/23  1037 10/04/22  1214 09/17/22  1255   HGB 13.7 12.8 13.4    No results for input(s): TROPONINI in the last 68000 hours.  Recent Labs   Lab Test 06/01/23  1037   *     Recent Labs   Lab Test 06/01/23  1037   TSH 0.61     No results for input(s): INR in the last 13278 hours.      Data Review    ECGs (tracings independently reviewed)  6/1/2023 - AF, ventricular rate 139bpm    6/7/2023 TTE  The left ventricle is mildly dilated.  Left ventricular function is decreased. The ejection fraction is 20-25%  (severely reduced).  Left ventricular diastolic function is abnormal.  There is severe global hypokinesia  of the left ventricle.  The right ventricle is mildly dilated. Moderately decreased right ventricular  systolic function  The left atrium is severely dilated. The right atrium is moderately dilated.  Mild mitral valve prolapse, bileaflet. There is mild to moderate (1-2+) mitral  regurgitation.  Right ventricular systolic pressure is elevated, consistent with mild  pulmonary hypertension.       80 minutes was spent reviewing the chart and in direct discussion with the patient.    Cc: Tomasz Briscoe MD  7/6/2023  9:40 AM

## 2023-07-06 NOTE — LETTER
2023    Tomasz Beltran MD  [address] [facility] [address]    RE: Verena Lagunas       Dear Colleague,     I had the pleasure of seeing Verena Lagunas in the [facility].     [facility]  Cardiac Electrophysiology  [address]  [place] [address]   Office: [phone]  Fax: [phone]     Cardiac Electrophysiology Consultation    Patient: Verena Lagunas   : 1955     Referring Provider: Tomasz Beltran MD  Primary Care Provider: Tomasz Beltran MD    CHIEF COMPLAINT/REASON FOR CONSULTATION  Persistent atrial fibrillation  Acute systolic heart failure (LVEF 20-25%, LVIDd 5.7cm by 2023 TTE)    Assessment/Recommendations   Verena Lagunas is a 68 year old female with persistent atrial fibrillation, acute systolic heart failure (LVEF 20-25%, LVIDd 5.7cm by 2023 TTE), HTN, COPD, deaf referred by Dr. Beltran for consultation regarding atrial fibrillation and heart failure.    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  We discussed atrial fibrillation ablation procedures, anticipated success rates, the potential need for re-do ablation vs addition of anti-arrhythmic drugs, procedural risks (including groin bleeding, tamponade, phrenic or esophageal injury, stroke, pulmonary vein stenosis) and recovery expectations.  She would prefer amiodarone, DCCV and as a bridge to ablation  She is deaf - she would prefer text or email for communication.  - amiodarone 400mg twice daily for 10 days, 200mg daily thereafter as short term strategy.  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 Beltran for consultation regarding atrial fibrillation and heart failure.    Mrs. Lagunas notes progressive dyspnea since around 4-5/2023.  She was found to be in atrial fibrillation with rapid ventricular rates 5/2023 with note of 6/7/2023 TTE showing LVEF 20-25%.      She notes some intermittent lower extremity edema.  denies chest pain, syncope.  She is working on stopping smoking.         Physical Examination  Review of Systems   VITALS: /70 (BP Location: Left arm, Patient Position: Sitting, Cuff Size: Adult Regular)   Pulse 100   Resp 16   Ht 1.676 m (5' 6\")   Wt 53.8 kg (118 lb 11.2 oz)   LMP  (LMP Unknown)   SpO2 91%   BMI 19.16 kg/m    Wt Readings from Last 3 Encounters:   06/30/23 53.3 kg (117 lb 8 oz)   06/23/23 53.9 kg (118 lb 14.4 oz)   06/15/23 51.9 kg (114 lb 8 oz)     CONSTITUTIONAL: well nourished, comfortable, no distress  EYES:  Conjunctivae pink, sclerae clear.    E/N/T:  " Oral mucosa pink  RESPIRATORY:  Respiratory effort is normal  CARDIOVASCULAR:  Irregular, tachycardic, normal S1 and S2  GASTROINTESTINAL:  Abdomen without masses or tenderness  EXTREMITIES:  No clubbing or cyanosis.    MUSCULOSKELETAL:  Overall grossly normal muscle strength  SKIN:  Overall, skin warm and dry, no lesions.  NEURO/PSYCH:  Oriented x 3 with normal affect.   Constitutional:  No weight loss or loss of appetite    Eyes:  No difficulty with vision, no double vision, no dry eyes  ENT:  No sore throat, difficulty swallowing; changes in hearing or tinnitus  Cardiovascular: As detailed above  Respiratory:  No cough  Musculoskeletal  No joint pain, muscle aches  Neurologic:  No syncope, lightheadedness, fainting spells   Hematologic: No easy bruising, excessive bleeding tendency   Gastrointestinal:  No jaundice, abdominal pain or abdominal bloating  Genitourinary: No changes in urinary habits, no trouble urinating    Psychiatric: No anxiety or depression      Medical History  Surgical History   Past Medical History:   Diagnosis Date    Allergic rhinitis     Hypertension     Tobacco abuse     Past Surgical History:   Procedure Laterality Date    OVARIAN CYST REMOVAL Right 1970         Family History Social History   Family History   Problem Relation Age of Onset    Colon Cancer Mother 79            Lung Cancer Father 50            No Known Problems Sister     Other - See Comments Brother         6 brothers and 1 sister    Cancer Brother     Breast Cancer Maternal Aunt 75        Social History     Tobacco Use    Smoking status: Former     Packs/day: 0.50     Types: Cigarettes     Quit date: 2023     Years since quittin.1     Passive exposure: Never    Smokeless tobacco: Never   Vaping Use    Vaping Use: Never used   Substance Use Topics    Alcohol use: No     Comment: Alcoholic Drinks/day: rare    Drug use: No         Medications  Allergies     Current Outpatient Medications:      albuterol (PROAIR HFA/PROVENTIL HFA/VENTOLIN HFA) 108 (90 Base) MCG/ACT inhaler, Inhale 2 puffs into the lungs every 6 hours as needed for shortness of breath, wheezing or cough, Disp: 18 g, Rfl: 11    apixaban ANTICOAGULANT (ELIQUIS ANTICOAGULANT) 5 MG tablet, Take 1 tablet (5 mg) by mouth 2 times daily, Disp: 180 tablet, Rfl: 3    atorvastatin (LIPITOR) 20 MG tablet, Take 1 tablet (20 mg) by mouth daily, Disp: 90 tablet, Rfl: 3    cholecalciferol, vitamin D3, (VITAMIN D3) 1,000 unit capsule, [CHOLECALCIFEROL, VITAMIN D3, (VITAMIN D3) 1,000 UNIT CAPSULE] Take 1 capsule (1,000 Units total) by mouth daily., Disp: , Rfl: 0    furosemide (LASIX) 20 MG tablet, Take 1 tablet (20 mg) by mouth daily, Disp: 90 tablet, Rfl: 1    losartan (COZAAR) 50 MG tablet, Take 1 tablet (50 mg) by mouth daily, Disp: 90 tablet, Rfl: 4    metoprolol succinate ER (TOPROL XL) 200 MG 24 hr tablet, Take 1 tablet (200 mg) by mouth daily, Disp: 90 tablet, Rfl: 4     Allergies   Allergen Reactions    Cat Hair Std Allergenic Ext [Cat Hair Extract] Unknown    Penicillins Shortness Of Breath          Lab Results    Chemistry CBC Cardiac Enzymes/BNP/TSH/INR   Recent Labs   Lab Test 06/30/23  1238   *   POTASSIUM 5.0   CHLORIDE 110*   CO2 29   GLC 68*   BUN 21.0   CR 1.09*   GFRESTIMATED 55*   MYRANDA 9.8     Recent Labs   Lab Test 06/30/23  1238 06/01/23  1037 10/04/22  1214   CR 1.09* 0.74 0.71          Recent Labs   Lab Test 06/01/23  1037   WBC 14.0*   HGB 13.7   HCT 43.1   MCV 87        Recent Labs   Lab Test 06/01/23  1037 10/04/22  1214 09/17/22  1255   HGB 13.7 12.8 13.4    No results for input(s): TROPONINI in the last 83475 hours.  Recent Labs   Lab Test 06/01/23  1037   *     Recent Labs   Lab Test 06/01/23  1037   TSH 0.61     No results for input(s): INR in the last 96591 hours.      Data Review    ECGs (tracings independently reviewed)  6/1/2023 - AF, ventricular rate 139bpm    6/7/2023 TTE  The left ventricle is mildly  dilated.  Left ventricular function is decreased. The ejection fraction is 20-25%  (severely reduced).  Left ventricular diastolic function is abnormal.  There is severe global hypokinesia of the left ventricle.  The right ventricle is mildly dilated. Moderately decreased right ventricular  systolic function  The left atrium is severely dilated. The right atrium is moderately dilated.  Mild mitral valve prolapse, bileaflet. There is mild to moderate (1-2+) mitral  regurgitation.  Right ventricular systolic pressure is elevated, consistent with mild  pulmonary hypertension.       80 minutes was spent reviewing the chart and in direct discussion with the patient.    Cc: Tomasz Briscoe MD  7/6/2023  9:40 AM          Thank you for allowing me to participate in the care of your patient.      Sincerely,     Vandana Briscoe MD     [facility]  cc:   Tomasz Beltran MD  [address] [place] [id number]

## 2023-07-06 NOTE — H&P (VIEW-ONLY)
[facility]  Cardiac Electrophysiology  [address]  [place] [address]   Office: [phone]  Fax: [phone]     Cardiac Electrophysiology Consultation    Patient: Verena Lagunas   : 1955     Referring Provider: Tomasz Beltran MD  Primary Care Provider: Tomasz Beltran MD    CHIEF COMPLAINT/REASON FOR CONSULTATION  Persistent atrial fibrillation  Acute systolic heart failure (LVEF 20-25%, LVIDd 5.7cm by 2023 TTE)    Assessment/Recommendations   Verena Lagunas is a 68 year old female with persistent atrial fibrillation, acute systolic heart failure (LVEF 20-25%, LVIDd 5.7cm by 2023 TTE), HTN, COPD, deaf referred by Dr. Beltran for consultation regarding atrial fibrillation and heart failure.    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  We discussed atrial fibrillation ablation procedures, anticipated success rates, the potential need for re-do ablation vs addition of anti-arrhythmic drugs, procedural risks (including groin bleeding, tamponade, phrenic or esophageal injury, stroke, pulmonary vein stenosis) and recovery expectations.  She would prefer amiodarone, DCCV and as a bridge to ablation  She is deaf - she would prefer text or email for communication.  - amiodarone 400mg twice daily for 10 days, 200mg daily thereafter as short term strategy.  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 Beltran for consultation regarding atrial fibrillation and heart failure.    Mrs. Lagunas notes progressive dyspnea since around 4-5/2023.  She was found to be in atrial fibrillation with rapid ventricular rates 5/2023 with note of 6/7/2023 TTE showing LVEF 20-25%.      She notes some intermittent lower extremity edema.  denies chest pain, syncope.  She is working on stopping smoking.         Physical Examination  Review of Systems   VITALS: /70 (BP Location: Left arm, Patient Position: Sitting, Cuff Size: Adult Regular)   Pulse 100   Resp 16   Ht 1.676 m (5' 6\")   Wt 53.8 kg (118 lb 11.2 oz)   LMP  (LMP Unknown)   SpO2 91%   BMI 19.16 kg/m    Wt Readings from Last 3 Encounters:   06/30/23 53.3 kg (117 lb 8 oz)   06/23/23 53.9 kg (118 lb 14.4 oz)   06/15/23 51.9 kg (114 lb 8 oz)     CONSTITUTIONAL: well nourished, comfortable, no distress  EYES:  Conjunctivae pink, sclerae clear.    E/N/T:  Oral mucosa pink  RESPIRATORY:  Respiratory effort is normal  CARDIOVASCULAR:  Irregular, tachycardic, normal S1 and S2  GASTROINTESTINAL:  Abdomen without masses or tenderness  EXTREMITIES:  No clubbing " or cyanosis.    MUSCULOSKELETAL:  Overall grossly normal muscle strength  SKIN:  Overall, skin warm and dry, no lesions.  NEURO/PSYCH:  Oriented x 3 with normal affect.   Constitutional:  No weight loss or loss of appetite    Eyes:  No difficulty with vision, no double vision, no dry eyes  ENT:  No sore throat, difficulty swallowing; changes in hearing or tinnitus  Cardiovascular: As detailed above  Respiratory:  No cough  Musculoskeletal  No joint pain, muscle aches  Neurologic:  No syncope, lightheadedness, fainting spells   Hematologic: No easy bruising, excessive bleeding tendency   Gastrointestinal:  No jaundice, abdominal pain or abdominal bloating  Genitourinary: No changes in urinary habits, no trouble urinating    Psychiatric: No anxiety or depression      Medical History  Surgical History   Past Medical History:   Diagnosis Date     Allergic rhinitis      Hypertension      Tobacco abuse     Past Surgical History:   Procedure Laterality Date     OVARIAN CYST REMOVAL Right 1970         Family History Social History   Family History   Problem Relation Age of Onset     Colon Cancer Mother 79             Lung Cancer Father 50             No Known Problems Sister      Other - See Comments Brother         6 brothers and 1 sister     Cancer Brother      Breast Cancer Maternal Aunt 75        Social History     Tobacco Use     Smoking status: Former     Packs/day: 0.50     Types: Cigarettes     Quit date: 2023     Years since quittin.1     Passive exposure: Never     Smokeless tobacco: Never   Vaping Use     Vaping Use: Never used   Substance Use Topics     Alcohol use: No     Comment: Alcoholic Drinks/day: rare     Drug use: No         Medications  Allergies     Current Outpatient Medications:      albuterol (PROAIR HFA/PROVENTIL HFA/VENTOLIN HFA) 108 (90 Base) MCG/ACT inhaler, Inhale 2 puffs into the lungs every 6 hours as needed for shortness of breath, wheezing or cough, Disp: 18 g, Rfl:  11     apixaban ANTICOAGULANT (ELIQUIS ANTICOAGULANT) 5 MG tablet, Take 1 tablet (5 mg) by mouth 2 times daily, Disp: 180 tablet, Rfl: 3     atorvastatin (LIPITOR) 20 MG tablet, Take 1 tablet (20 mg) by mouth daily, Disp: 90 tablet, Rfl: 3     cholecalciferol, vitamin D3, (VITAMIN D3) 1,000 unit capsule, [CHOLECALCIFEROL, VITAMIN D3, (VITAMIN D3) 1,000 UNIT CAPSULE] Take 1 capsule (1,000 Units total) by mouth daily., Disp: , Rfl: 0     furosemide (LASIX) 20 MG tablet, Take 1 tablet (20 mg) by mouth daily, Disp: 90 tablet, Rfl: 1     losartan (COZAAR) 50 MG tablet, Take 1 tablet (50 mg) by mouth daily, Disp: 90 tablet, Rfl: 4     metoprolol succinate ER (TOPROL XL) 200 MG 24 hr tablet, Take 1 tablet (200 mg) by mouth daily, Disp: 90 tablet, Rfl: 4     Allergies   Allergen Reactions     Cat Hair Std Allergenic Ext [Cat Hair Extract] Unknown     Penicillins Shortness Of Breath          Lab Results    Chemistry CBC Cardiac Enzymes/BNP/TSH/INR   Recent Labs   Lab Test 06/30/23  1238   *   POTASSIUM 5.0   CHLORIDE 110*   CO2 29   GLC 68*   BUN 21.0   CR 1.09*   GFRESTIMATED 55*   MYRANDA 9.8     Recent Labs   Lab Test 06/30/23  1238 06/01/23  1037 10/04/22  1214   CR 1.09* 0.74 0.71          Recent Labs   Lab Test 06/01/23  1037   WBC 14.0*   HGB 13.7   HCT 43.1   MCV 87        Recent Labs   Lab Test 06/01/23  1037 10/04/22  1214 09/17/22  1255   HGB 13.7 12.8 13.4    No results for input(s): TROPONINI in the last 81485 hours.  Recent Labs   Lab Test 06/01/23  1037   *     Recent Labs   Lab Test 06/01/23  1037   TSH 0.61     No results for input(s): INR in the last 41999 hours.      Data Review    ECGs (tracings independently reviewed)  6/1/2023 - AF, ventricular rate 139bpm    6/7/2023 TTE  The left ventricle is mildly dilated.  Left ventricular function is decreased. The ejection fraction is 20-25%  (severely reduced).  Left ventricular diastolic function is abnormal.  There is severe global hypokinesia  of the left ventricle.  The right ventricle is mildly dilated. Moderately decreased right ventricular  systolic function  The left atrium is severely dilated. The right atrium is moderately dilated.  Mild mitral valve prolapse, bileaflet. There is mild to moderate (1-2+) mitral  regurgitation.  Right ventricular systolic pressure is elevated, consistent with mild  pulmonary hypertension.       80 minutes was spent reviewing the chart and in direct discussion with the patient.    Cc: Tomasz Briscoe MD  7/6/2023  9:40 AM

## 2023-07-07 ENCOUNTER — DOCUMENTATION ONLY (OUTPATIENT)
Dept: CARDIOLOGY | Facility: CLINIC | Age: 68
End: 2023-07-07

## 2023-07-07 ENCOUNTER — PREP FOR PROCEDURE (OUTPATIENT)
Dept: CARDIOLOGY | Facility: CLINIC | Age: 68
End: 2023-07-07

## 2023-07-07 DIAGNOSIS — I48.91 ATRIAL FIBRILLATION WITH RVR (H): ICD-10-CM

## 2023-07-07 DIAGNOSIS — I48.91 ATRIAL FIBRILLATION WITH RVR (H): Primary | ICD-10-CM

## 2023-07-07 DIAGNOSIS — I50.21 ACUTE SYSTOLIC CONGESTIVE HEART FAILURE (H): ICD-10-CM

## 2023-07-07 RX ORDER — AMIODARONE HYDROCHLORIDE 200 MG/1
TABLET ORAL
Qty: 100 TABLET | Refills: 3 | Status: SHIPPED | OUTPATIENT
Start: 2023-07-07 | End: 2023-08-16

## 2023-07-07 RX ORDER — LIDOCAINE 40 MG/G
CREAM TOPICAL
Status: CANCELLED | OUTPATIENT
Start: 2023-07-07

## 2023-07-07 NOTE — PROGRESS NOTES
H&P Date: Teach Date: EDUARDA No CT/  MRI No   PVI  Order Case Req Y  Order Set [] Lab/EKG  Orders [] Letter [] F/U RN Date:  NP follow-up Date:     AC Eliquis- CONTINUE     AAD Amiodarone-Hold 7 days   PPI Start Protonix 40mg Daily 3 days prior, 6wk post DM Vandana He MD  P [facility] Ep Support Pool - [facility],     Mrs. Lagunas has persAF, severe systolic HF.  Starting amiodarone 400mg twice daily x10d, then 200mg daily.  Can we plan for the following:   - DCCV in 7-10d   - PVI (soonest available), general anesthesia, hold amiodarone 7 days prior (likely resume for 6-12 weeks post ablation), continue apixaban     She is deaf - she would prefer text or email for communication, and will need sign- present for visits/DCCV/PVI.     TYKat   William     1955  Home:[phone] (home) Cell:[phone] (mobile)  Emergency Contact: BeboMynor osborn   PCP: Tomasz Beltran, [phone]    Important patient information for CSC/Cath Lab staff : Pt is DEAF, needs     [facility] EP Cath Lab Procedure Order   Ablation Type:  PVI- Atrial Fibrillation  Diagnosis:  AF  Ordering Provider: Dr Briscoe  Ordering Date: 7/7/2023    Scheduling Information:  Anticipated Case Duration:  Standard ( Case per day SA 2:1, DW 4:1, KA 3:1)   Scheduling Timeframe:  Next Available  Scheduling Restrictions: None  Scheduling Contact: Please contact pt to schedule, if you are unable to schedule date within the next 24 hours please contact pt to update on scheduling process  EP RN Follow Up Apt: Schedule EP RN PC visit 3-4 days s/p PVI  MD Preference: Scheduling with ordering provider  Current Device/Device Co Needed for Procedure: None NoneNone  Pre-Procedural Testing needed: None  Mapping System Required:  Carto (Dr Briscoe and Dr Sheriff)  ICE Needed:  Yes  Anesthesia:General Anesthesia    [facility] EP Cath Lab Prep   H&P:  Schedule H&P with EP DOYLE, RN Teach, and Labs within 30 days of PVI  Pre-op Labs: CBC, BMP, Beta  HcG if appropriate, and INR if on Warfarin will be ordered AM of procedure  T&S Pre-Procedure Review: Does not need for PVI procedures  Medical Records Pertinent for Procedure:  Echo 6-1-23 EF 20-25% and EKG 6-1-23 AFib @ 139  Allergies: Reviewed allergies, no concerns regarding orders for procedure    Allergies   Allergen Reactions     Cat Hair Std Allergenic Ext [Cat Hair Extract] Unknown     Penicillins Shortness Of Breath       Current Outpatient Medications:      albuterol (PROAIR HFA/PROVENTIL HFA/VENTOLIN HFA) 108 (90 Base) MCG/ACT inhaler, Inhale 2 puffs into the lungs every 6 hours as needed for shortness of breath, wheezing or cough, Disp: 18 g, Rfl: 11     amiodarone (PACERONE) 200 MG tablet, Take 400mg twice daily for 10 days, then 200mg daily, Disp: 100 tablet, Rfl: 3     apixaban ANTICOAGULANT (ELIQUIS ANTICOAGULANT) 5 MG tablet, Take 1 tablet (5 mg) by mouth 2 times daily, Disp: 180 tablet, Rfl: 3     atorvastatin (LIPITOR) 20 MG tablet, Take 1 tablet (20 mg) by mouth daily, Disp: 90 tablet, Rfl: 3     cholecalciferol, vitamin D3, (VITAMIN D3) 1,000 unit capsule, [CHOLECALCIFEROL, VITAMIN D3, (VITAMIN D3) 1,000 UNIT CAPSULE] Take 1 capsule (1,000 Units total) by mouth daily., Disp: , Rfl: 0     furosemide (LASIX) 20 MG tablet, Take 1 tablet (20 mg) by mouth daily, Disp: 90 tablet, Rfl: 1     losartan (COZAAR) 50 MG tablet, Take 1 tablet (50 mg) by mouth daily, Disp: 90 tablet, Rfl: 4     metoprolol succinate ER (TOPROL XL) 200 MG 24 hr tablet, Take 1 tablet (200 mg) by mouth daily, Disp: 90 tablet, Rfl: 4    Documentation Date:7/7/2023 3:35 PM  Jennyfer Salamanca RN

## 2023-07-07 NOTE — PROGRESS NOTES
H&P  PMD: []  Received [] Card OV: [x]  Date: 7-6 Sent LM  []  Teach  []   Orders  I [x] P  [x]  Letter []   AC: Eliquis NP Med Review: NEEDS review  -Amiodarone load  -Metoprolol Succinate 200 mg daily  All other AM Meds: Take All       1955  Home:[phone] (home) Cell:[phone] (mobile)  Emergency Contact: Mynor Lagunas   PCP: Tomasz Beltran, [phone]    +++Important patient information for CSC/Cath Lab staff : PT IS DEAF+++ Will need     [facility] EP Cath Lab Procedure Order   Cardioversion:  Cardioversion  Ordering Provider: Dr Briscoe  Ordering Date: 7/6/2023  Diagnosis:  AF  Anticipated Case Duration:  Standard  Scheduling Needs/Timeframe:  7-17-23 or after-amiodarone load  Scheduling Contact: Please contact pt to schedule date/time for procedure, if you are unable to schedule date within the next 24 hours please contact pt to update on scheduling process  Cardiology Follow Up Apt s/p: Standard- EP DOYLE @ 4-6 wks or previously scheduled General Card apt  Current Device/Device Co Needed for Procedure: None NoneNone  Pre-Procedural Testing needed: None  Anesthesia:  General    [facility] EP Cath Lab Prep   H&P:  Compled by cardiology on 7-6-23 if scheduled within 30 days, pt to schedule with PMD if procedure outside of this timeframe  Pre-op Labs: K if pt taking diuretic medication or hx of low Potassium, Beta HcG if appropriate, and INR if on Warfarin will be ordered AM of procedure  T&S Pre-Procedure Review: T&S is not required for DCCV/DFT Testing  Medical Records Pertinent for Procedure:  Echo 6-1-23 EF 20-25%;  EKG 6-1-23 Af @139    Allergies   Allergen Reactions     Cat Hair Std Allergenic Ext [Cat Hair Extract] Unknown     Penicillins Shortness Of Breath       Current Outpatient Medications:      albuterol (PROAIR HFA/PROVENTIL HFA/VENTOLIN HFA) 108 (90 Base) MCG/ACT inhaler, Inhale 2 puffs into the lungs every 6 hours as needed for shortness of breath, wheezing or cough, Disp: 18 g, Rfl:  11     amiodarone (PACERONE) 200 MG tablet, Take 400mg twice daily for 10 days, then 200mg daily, Disp: 100 tablet, Rfl: 3     apixaban ANTICOAGULANT (ELIQUIS ANTICOAGULANT) 5 MG tablet, Take 1 tablet (5 mg) by mouth 2 times daily, Disp: 180 tablet, Rfl: 3     atorvastatin (LIPITOR) 20 MG tablet, Take 1 tablet (20 mg) by mouth daily, Disp: 90 tablet, Rfl: 3     cholecalciferol, vitamin D3, (VITAMIN D3) 1,000 unit capsule, [CHOLECALCIFEROL, VITAMIN D3, (VITAMIN D3) 1,000 UNIT CAPSULE] Take 1 capsule (1,000 Units total) by mouth daily., Disp: , Rfl: 0     furosemide (LASIX) 20 MG tablet, Take 1 tablet (20 mg) by mouth daily, Disp: 90 tablet, Rfl: 1     losartan (COZAAR) 50 MG tablet, Take 1 tablet (50 mg) by mouth daily, Disp: 90 tablet, Rfl: 4     metoprolol succinate ER (TOPROL XL) 200 MG 24 hr tablet, Take 1 tablet (200 mg) by mouth daily, Disp: 90 tablet, Rfl: 4    Documentation Date:7/7/2023 3:24 PM  Jennyfer Salamanca RN

## 2023-07-10 DIAGNOSIS — I48.91 ATRIAL FIBRILLATION WITH RVR (H): Primary | ICD-10-CM

## 2023-07-10 NOTE — PROGRESS NOTES
Letter sent via [employer] and mail. Direct message sent to pt via [employer] of when to stop taking Amiodarone. Will confirm pt has read.   Orders for labs and ECG for H&P completed.     Elvia Arriaza RN

## 2023-07-11 NOTE — PROGRESS NOTES
Initial instruction to reduce metorpolol to 50mg day of procedure changed to HOLD metoprolol day of procedure. Letter sent to [employer] and mail.   Message sent to pt in [employer] to message nurse back with any questions or concerns regarding procedure. Ability to send direct message back to nurse.     Elvia Arriaza RN

## 2023-07-14 ENCOUNTER — TELEPHONE (OUTPATIENT)
Dept: INTERNAL MEDICINE | Facility: CLINIC | Age: 68
End: 2023-07-14

## 2023-07-14 ENCOUNTER — OFFICE VISIT (OUTPATIENT)
Dept: INTERNAL MEDICINE | Facility: CLINIC | Age: 68
End: 2023-07-14
Payer: COMMERCIAL

## 2023-07-14 VITALS
HEART RATE: 74 BPM | HEIGHT: 66 IN | SYSTOLIC BLOOD PRESSURE: 100 MMHG | RESPIRATION RATE: 16 BRPM | DIASTOLIC BLOOD PRESSURE: 58 MMHG | WEIGHT: 117.3 LBS | BODY MASS INDEX: 18.85 KG/M2 | OXYGEN SATURATION: 94 % | TEMPERATURE: 99 F

## 2023-07-14 DIAGNOSIS — J41.0 SIMPLE CHRONIC BRONCHITIS (H): ICD-10-CM

## 2023-07-14 DIAGNOSIS — E78.2 MIXED HYPERLIPIDEMIA: ICD-10-CM

## 2023-07-14 DIAGNOSIS — I50.21 ACUTE SYSTOLIC CONGESTIVE HEART FAILURE (H): ICD-10-CM

## 2023-07-14 DIAGNOSIS — I48.91 ATRIAL FIBRILLATION WITH RVR (H): Primary | ICD-10-CM

## 2023-07-14 LAB
ANION GAP SERPL CALCULATED.3IONS-SCNC: 9 MMOL/L (ref 7–15)
BUN SERPL-MCNC: 19.1 MG/DL (ref 8–23)
CALCIUM SERPL-MCNC: 9.5 MG/DL (ref 8.8–10.2)
CHLORIDE SERPL-SCNC: 100 MMOL/L (ref 98–107)
CREAT SERPL-MCNC: 1.07 MG/DL (ref 0.51–0.95)
DEPRECATED HCO3 PLAS-SCNC: 30 MMOL/L (ref 22–29)
ERYTHROCYTE [DISTWIDTH] IN BLOOD BY AUTOMATED COUNT: 14.6 % (ref 10–15)
GFR SERPL CREATININE-BSD FRML MDRD: 56 ML/MIN/1.73M2
GLUCOSE SERPL-MCNC: 60 MG/DL (ref 70–99)
HCT VFR BLD AUTO: 44.1 % (ref 35–47)
HGB BLD-MCNC: 13.7 G/DL (ref 11.7–15.7)
MCH RBC QN AUTO: 27.8 PG (ref 26.5–33)
MCHC RBC AUTO-ENTMCNC: 31.1 G/DL (ref 31.5–36.5)
MCV RBC AUTO: 90 FL (ref 78–100)
PLATELET # BLD AUTO: 251 10E3/UL (ref 150–450)
POTASSIUM SERPL-SCNC: 4.8 MMOL/L (ref 3.4–5.3)
RBC # BLD AUTO: 4.93 10E6/UL (ref 3.8–5.2)
SODIUM SERPL-SCNC: 139 MMOL/L (ref 136–145)
WBC # BLD AUTO: 10.1 10E3/UL (ref 4–11)

## 2023-07-14 PROCEDURE — 36415 COLL VENOUS BLD VENIPUNCTURE: CPT | Performed by: INTERNAL MEDICINE

## 2023-07-14 PROCEDURE — 99214 OFFICE O/P EST MOD 30 MIN: CPT | Performed by: INTERNAL MEDICINE

## 2023-07-14 PROCEDURE — 80048 BASIC METABOLIC PNL TOTAL CA: CPT | Performed by: INTERNAL MEDICINE

## 2023-07-14 PROCEDURE — 85027 COMPLETE CBC AUTOMATED: CPT | Performed by: INTERNAL MEDICINE

## 2023-07-14 ASSESSMENT — PAIN SCALES - GENERAL: PAINLEVEL: NO PAIN (0)

## 2023-07-14 NOTE — Clinical Note
Dr. Briscoe,  Thank you for seeing Verena SIRI Lagunas.  I noticed that you have scheduled an ablation on 8/21/2023 and she is scheduled with Dr. Nance on 8/29/2023.  I am wondering if you think we will need to further evaluate her cardiomyopathy prior to the ablation including evaluating for ischemic disease?  If so, I could get this started and I would normally do a pharmacologic nuclear medicine stress test, but we could also consider alternative testing if you were to recommend that.  If she does not need an ischemic evaluation prior to the ablation then I will defer to Dr. Nance on August 29.  Thanks!  Diogo Beltran

## 2023-07-14 NOTE — LETTER
July 14, 2023      Verena Lagunas  [address]   [place] [address]        To Whom It May Concern:    Verena Lagunas was seen on 7/14/2023.  Please excuse her  until 9/11/2023 due to illness.  Over the next two months she will have multiple procedures to treat a newly diagnosed heart condition.        Sincerely,        Tomasz Beltran MD

## 2023-07-14 NOTE — PROGRESS NOTES
"  Office Visit - Follow Up   Verena Lagunas   68 year old female    Date of Visit: 7/14/2023    Chief Complaint   Patient presents with     Follow Up     Cough     Pt has a cough in the morning, dry throat.      Procedures     Cardioversion - 7/17/23  Pulmonary Vein Isolation Ablation - 8/16/23        Assessment and Plan   1. Atrial fibrillation with RVR (H)  Heart rate is better controlled on metoprolol and amiodarone.  She will have a cardioversion coming up next week and then an ablation scheduled on August 21, 2023.  I have messaged Dr. Briscoe regarding potential need for ischemic evaluation prior to her ablation.  I had plan to defer this to Dr. Nance but she will not see him until 8/29/2023    2. Acute systolic congestive heart failure (H)  Now appears euvolemic, continue same medications, check labs  - Basic metabolic panel  (Ca, Cl, CO2, Creat, Gluc, K, Na, BUN); Future  - CBC with platelets; Future  - Basic metabolic panel  (Ca, Cl, CO2, Creat, Gluc, K, Na, BUN)  - CBC with platelets    3. Mixed hyperlipidemia  Continue statin    4. Simple chronic bronchitis (H)  He is no longer smoking, excellent    Return in about 4 weeks (around 8/11/2023) for Follow up.     History of Present Illness   This 68 year old woman comes in for follow-up.  She recently met with Dr. Briscoe and has a cardioversion planned as well as a pulmonary vein ablation.  She was started on amiodarone.  She is actually feeling better her breathing has improved.  She still gets short of breath with any significant activity.       Physical Exam   General Appearance:   No acute distress    /58 (BP Location: Left arm, Patient Position: Sitting, Cuff Size: Adult Regular)   Pulse 74   Temp 99  F (37.2  C) (Tympanic)   Resp 16   Ht 1.676 m (5' 5.98\")   Wt 53.2 kg (117 lb 4.8 oz)   LMP  (LMP Unknown)   SpO2 94%   BMI 18.94 kg/m      Her heart rate is less than 100, it remains irregularly irregular.  Lungs are clear and no edema. "     Additional Information   Current Outpatient Medications   Medication Sig Dispense Refill     albuterol (PROAIR HFA/PROVENTIL HFA/VENTOLIN HFA) 108 (90 Base) MCG/ACT inhaler Inhale 2 puffs into the lungs every 6 hours as needed for shortness of breath, wheezing or cough 18 g 11     amiodarone (PACERONE) 200 MG tablet Take 400mg twice daily for 10 days, then 200mg daily 100 tablet 3     apixaban ANTICOAGULANT (ELIQUIS ANTICOAGULANT) 5 MG tablet Take 1 tablet (5 mg) by mouth 2 times daily 180 tablet 3     atorvastatin (LIPITOR) 20 MG tablet Take 1 tablet (20 mg) by mouth daily 90 tablet 3     cholecalciferol, vitamin D3, (VITAMIN D3) 1,000 unit capsule [CHOLECALCIFEROL, VITAMIN D3, (VITAMIN D3) 1,000 UNIT CAPSULE] Take 1 capsule (1,000 Units total) by mouth daily.  0     furosemide (LASIX) 20 MG tablet Take 1 tablet (20 mg) by mouth daily 90 tablet 1     losartan (COZAAR) 50 MG tablet Take 1 tablet (50 mg) by mouth daily 90 tablet 4     metoprolol succinate ER (TOPROL XL) 200 MG 24 hr tablet Take 1 tablet (200 mg) by mouth daily 90 tablet 4       Time:      Tomasz Beltran MD  Answers for HPI/ROS submitted by the patient on 7/14/2023  Dyspnea:: with activity only  Status:: same as usual  Edema:: No  Are you using more pillows than usual at night?: No  Do you cough at night?: No  Checking weight daily:: No  Weight change recently:: weight increase  Heart Medication Side Effects:: other  Frequency:: 1 time  How many servings of fruits and vegetables do you eat daily?: 2-3  On average, how many sweetened beverages do you drink each day (Examples: soda, juice, sweet tea, etc.  Do NOT count diet or artificially sweetened beverages)?: 0  How many minutes a day do you exercise enough to make your heart beat faster?: 9 or less  How many days a week do you exercise enough to make your heart beat faster?: 3 or less  How many days per week do you miss taking your medication?: 0

## 2023-07-17 ENCOUNTER — ANESTHESIA (OUTPATIENT)
Dept: CARDIOLOGY | Facility: HOSPITAL | Age: 68
End: 2023-07-17
Payer: COMMERCIAL

## 2023-07-17 ENCOUNTER — ANESTHESIA EVENT (OUTPATIENT)
Dept: CARDIOLOGY | Facility: HOSPITAL | Age: 68
End: 2023-07-17
Payer: COMMERCIAL

## 2023-07-17 ENCOUNTER — HOSPITAL ENCOUNTER (OUTPATIENT)
Dept: CARDIOLOGY | Facility: HOSPITAL | Age: 68
Discharge: HOME | End: 2023-07-17
Attending: INTERNAL MEDICINE | Admitting: NURSE PRACTITIONER
Payer: COMMERCIAL

## 2023-07-17 VITALS
OXYGEN SATURATION: 91 % | RESPIRATION RATE: 35 BRPM | HEIGHT: 66 IN | TEMPERATURE: 97.9 F | BODY MASS INDEX: 18.85 KG/M2 | DIASTOLIC BLOOD PRESSURE: 74 MMHG | HEART RATE: 72 BPM | WEIGHT: 117.28 LBS | SYSTOLIC BLOOD PRESSURE: 142 MMHG

## 2023-07-17 DIAGNOSIS — I48.19 PERSISTENT ATRIAL FIBRILLATION (H): Primary | ICD-10-CM

## 2023-07-17 DIAGNOSIS — I48.91 ATRIAL FIBRILLATION WITH RVR (H): ICD-10-CM

## 2023-07-17 LAB
ATRIAL RATE - MUSE: 69 BPM
DIASTOLIC BLOOD PRESSURE - MUSE: NORMAL MMHG
INTERPRETATION ECG - MUSE: NORMAL
P AXIS - MUSE: 78 DEGREES
PR INTERVAL - MUSE: 160 MS
QRS DURATION - MUSE: 100 MS
QT - MUSE: 402 MS
QTC - MUSE: 430 MS
R AXIS - MUSE: 79 DEGREES
SYSTOLIC BLOOD PRESSURE - MUSE: NORMAL MMHG
T AXIS - MUSE: 220 DEGREES
VENTRICULAR RATE- MUSE: 69 BPM

## 2023-07-17 PROCEDURE — 92960 CARDIOVERSION ELECTRIC EXT: CPT

## 2023-07-17 PROCEDURE — 93005 ELECTROCARDIOGRAM TRACING: CPT

## 2023-07-17 PROCEDURE — 250N000009 HC RX 250: Performed by: INTERNAL MEDICINE

## 2023-07-17 PROCEDURE — 92960 CARDIOVERSION ELECTRIC EXT: CPT | Performed by: NURSE PRACTITIONER

## 2023-07-17 PROCEDURE — 93010 ELECTROCARDIOGRAM REPORT: CPT | Performed by: INTERNAL MEDICINE

## 2023-07-17 PROCEDURE — 370N000003 HC ANESTHESIA WARD SERVICE: Performed by: ANESTHESIOLOGY

## 2023-07-17 PROCEDURE — 999N000054 HC STATISTIC EKG NON-CHARGEABLE

## 2023-07-17 PROCEDURE — 250N000009 HC RX 250: Performed by: NURSE ANESTHETIST, CERTIFIED REGISTERED

## 2023-07-17 RX ORDER — METOPROLOL TARTRATE 1 MG/ML
5 INJECTION, SOLUTION INTRAVENOUS ONCE
Status: COMPLETED | OUTPATIENT
Start: 2023-07-17 | End: 2023-07-17

## 2023-07-17 RX ORDER — LIDOCAINE 40 MG/G
CREAM TOPICAL
Status: DISCONTINUED | OUTPATIENT
Start: 2023-07-17 | End: 2023-07-17 | Stop reason: HOSPADM

## 2023-07-17 RX ORDER — METOPROLOL SUCCINATE 25 MG/1
25 TABLET, EXTENDED RELEASE ORAL DAILY
Qty: 90 TABLET | Refills: 3 | Status: SHIPPED | OUTPATIENT
Start: 2023-07-17 | End: 2023-08-07

## 2023-07-17 RX ADMIN — METOPROLOL TARTRATE 5 MG: 5 INJECTION INTRAVENOUS at 09:40

## 2023-07-17 RX ADMIN — METHOHEXITAL SODIUM 70 MG: 500 INJECTION, POWDER, LYOPHILIZED, FOR SOLUTION INTRAMUSCULAR; INTRAVENOUS; RECTAL at 09:28

## 2023-07-17 ASSESSMENT — ACTIVITIES OF DAILY LIVING (ADL)
ADLS_ACUITY_SCORE: 35

## 2023-07-17 ASSESSMENT — ENCOUNTER SYMPTOMS: DYSRHYTHMIAS: 1

## 2023-07-17 NOTE — INTERVAL H&P NOTE
I have reviewed the surgical (or preoperative) H&P that is linked to this encounter, and examined the patient. There are no significant changes  Patient has not  missed any doses of Eliquis in the last 21 days  Clinical Conditions Present on Arrival:  Clinically Significant Risk Factors Present on Admission         # Hypernatremia: Highest Na = 152 mmol/L in last 30 days, will monitor as appropriate        # Drug Induced Coagulation Defect: home medication list includes an anticoagulant medication

## 2023-07-17 NOTE — PROCEDURES
[facility]    Procedure: Cardioversion    Date/Time: 7/17/2023 9:30 AM    Performed by: Leigh Robin NP  Authorized by: Vandana Briscoe MD      UNIVERSAL PROTOCOL   Site Marked: Yes  Prior Images Obtained and Reviewed:  NA  Required items: Required blood products, implants, devices and special equipment available    Patient identity confirmed:  Verbally with patient, arm band and provided demographic data  Patient was reevaluated immediately before administering moderate or deep sedation or anesthesia  Confirmation Checklist:  Patient's identity using two indicators, relevant allergies, procedure was appropriate and matched the consent or emergent situation and correct equipment/implants were available  Time out: Immediately prior to the procedure a time out was called    Universal Protocol: the Joint Commission Universal Protocol was followed    Preparation: Patient was prepped and draped in usual sterile fashion       ANESTHESIA  Anesthesia was administered and monitored by anesthesiology.  See anesthesia documentation for details.    PROCEDURE DETAILS  Cardioversion basis: elective  Pre-procedure rhythm: atrial fibrillation  Patient position: patient was placed in a supine position  Chest area: chest area exposed  Electrodes: pads  Electrodes placed: anterior-posterior  Number of attempts: 1    Details of Attempts:  After administration of IV Brevital by KONG and confirmation of adequate sedation, he received a synchronized shock of 200 J with prompt restoration of sinus rhythm. Rates variable post DCCV between 70- 120's so metoprolol 5 mg IV administered per Dr Yañez. Rates post IV metoprolol steadily in the 60-70's. Post DCCV 12 lead EKG is pending review.     Post-procedure rhythm: normal sinus rhythm  Complications: no complications      PROCEDURE  Describe Procedure: 68 year old female with known history of  Persistent Afib, acute systolic HF, LVEF b/w 20-25% on last  ECHO 6/7/23, HTN, COPD, hearing impaired.  at the bedside. Noted to have progressive dyspnea since April 2023. Diagnosed with persistent Afib in May 2023. Remains on Amiodarone 200 mg daily for rhythm control, metoprolol succinate reduced down to 25 mg daily for rate control, previously on 200 mg daily. Rates currently in the 70's now that sinus rhythm restored. She has an appointment to follow up in clinic with Carmen Reynolds NP in August for further evaluation. PVI is scheduled for 8/21/23 by Dr Briscoe. She will continue on Eliquis 5 mg BID for stroke prevention.   Length of time physician/provider present for 1:1 monitoring during sedation: 10    DCCV performed under the guidance of Dr Yañez.

## 2023-07-17 NOTE — PLAN OF CARE
Goal Outcome Evaluation:             Pt admitted for cardioversion due to atrial fibrillation.  Pt converted to NSR after 200 J shock.  Pt  awake and ate.  Discharge instructions given with the help of the .  Pt discharged home with .      Sushma Grimm RN

## 2023-07-17 NOTE — ANESTHESIA POSTPROCEDURE EVALUATION
Patient: Verena Lagunas    Procedure: * No procedures listed *  Cardioversion External    Anesthesia Type:  General    Note:  Disposition: Outpatient   Postop Pain Control: Uneventful            Sign Out: Well controlled pain   PONV: No   Neuro/Psych: Uneventful            Sign Out: Acceptable/Baseline neuro status   Airway/Respiratory: Uneventful            Sign Out: Acceptable/Baseline resp. status   CV/Hemodynamics: Uneventful            Sign Out: Acceptable CV status; No obvious hypovolemia; No obvious fluid overload   Other NRE: NONE   DID A NON-ROUTINE EVENT OCCUR? No           Last vitals:  Vitals:    07/17/23 1001 07/17/23 1015 07/17/23 1017   BP: 133/57 134/66 (!) 142/74   Pulse: 75 71 72   Resp: 24 25 (!) 35   Temp:      SpO2:          Electronically Signed By: Chris Stiles MD  July 17, 2023  12:46 PM

## 2023-07-17 NOTE — ANESTHESIA CARE TRANSFER NOTE
Patient: Verena Lagunas    Procedure: * No procedures listed *  Cardioversion External    Diagnosis: * No pre-op diagnosis entered *  Diagnosis Additional Information: No value filed.    Anesthesia Type:   No value filed.     Note:    Oropharynx: oropharynx clear of all foreign objects  Level of Consciousness: drowsy  Oxygen Supplementation: nasal cannula  Level of Supplemental Oxygen (L/min / FiO2): 2  Independent Airway: airway patency satisfactory and stable  Dentition: dentition unchanged  Vital Signs Stable: post-procedure vital signs reviewed and stable  Report to RN Given: handoff report given  Patient transferred to: [facility]          Vitals:  Vitals Value Taken Time   /84 07/17/23 0933   Temp     Pulse 75 07/17/23 0933   Resp 14 07/17/23 0933   SpO2 99 % 07/17/23 0933       Electronically Signed By: SARAH Lee CRNA  July 17, 2023  9:33 AM

## 2023-07-17 NOTE — ANESTHESIA PREPROCEDURE EVALUATION
Anesthesia Pre-Procedure Evaluation    Patient: Verena Lagunas   MRN: [id number] : 1955        Procedure : * No procedures listed *  Cardioversion External       Past Medical History:   Diagnosis Date     Allergic rhinitis      Hypertension      Tobacco abuse       Past Surgical History:   Procedure Laterality Date     OVARIAN CYST REMOVAL Right 1970      Allergies   Allergen Reactions     Cat Hair Std Allergenic Ext [Cat Hair Extract] Unknown     Penicillins Shortness Of Breath      Social History     Tobacco Use     Smoking status: Former     Packs/day: 0.50     Types: Cigarettes     Quit date: 2023     Years since quittin.1     Passive exposure: Never     Smokeless tobacco: Never   Substance Use Topics     Alcohol use: No     Comment: Alcoholic Drinks/day: rare      Wt Readings from Last 1 Encounters:   23 53.2 kg (117 lb 4.6 oz)        Anesthesia Evaluation   Pt has had prior anesthetic.         ROS/MED HX  ENT/Pulmonary:  - neg pulmonary ROS     Neurologic:  - neg neurologic ROS     Cardiovascular:     (+) hypertension-----CHF dysrhythmias, a-fib,  (-) murmur and wheezes   METS/Exercise Tolerance: >4 METS    Hematologic:  - neg hematologic  ROS     Musculoskeletal:  - neg musculoskeletal ROS     GI/Hepatic:     (+) GERD,     Renal/Genitourinary:  - neg Renal ROS     Endo:  - neg endo ROS     Psychiatric/Substance Use:  - neg psychiatric ROS     Infectious Disease:  - neg infectious disease ROS     Malignancy:  - neg malignancy ROS     Other:  - neg other ROS          Physical Exam    Airway  airway exam normal      Mallampati: II   TM distance: > 3 FB   Neck ROM: full   Mouth opening: > 3 cm    Respiratory Devices and Support         Dental       (+) Multiple visibly decayed, broken teeth    B=Bridge, C=Chipped, L=Loose, M=Missing    Cardiovascular          Rhythm and rate: irregular and tachycardia (-) no murmur    Pulmonary           breath sounds clear to auscultation   (-) no  wheezes        OUTSIDE LABS:  CBC:   Lab Results   Component Value Date    WBC 10.1 07/14/2023    WBC 14.0 (H) 06/01/2023    HGB 13.7 07/14/2023    HGB 13.7 06/01/2023    HCT 44.1 07/14/2023    HCT 43.1 06/01/2023     07/14/2023     06/01/2023     BMP:   Lab Results   Component Value Date     07/14/2023     (H) 06/30/2023    POTASSIUM 4.8 07/14/2023    POTASSIUM 5.0 06/30/2023    CHLORIDE 100 07/14/2023    CHLORIDE 110 (H) 06/30/2023    CO2 30 (H) 07/14/2023    CO2 29 06/30/2023    BUN 19.1 07/14/2023    BUN 21.0 06/30/2023    CR 1.07 (H) 07/14/2023    CR 1.09 (H) 06/30/2023    GLC 60 (L) 07/14/2023    GLC 68 (L) 06/30/2023     COAGS: No results found for: PTT, INR, FIBR  POC: No results found for: BGM, HCG, HCGS  HEPATIC:   Lab Results   Component Value Date    ALBUMIN 3.6 09/27/2021    PROTTOTAL 6.6 09/27/2021    ALT 21 09/27/2021    AST 27 09/27/2021    ALKPHOS 58 09/27/2021    BILITOTAL 0.4 09/27/2021     OTHER:   Lab Results   Component Value Date    A1C 5.6 12/22/2016    MYRANDA 9.5 07/14/2023    TSH 0.61 06/01/2023       Anesthesia Plan    ASA Status:  3   NPO Status:  NPO Appropriate    Anesthesia Type: General.     - Airway: Mask Only   Induction: Intravenous.   Maintenance: TIVA.        Consents    Anesthesia Plan(s) and associated risks, benefits, and realistic alternatives discussed. Questions answered and patient/representative(s) expressed understanding.    - Discussed:     - Discussed with:  Patient,       - Specific Concerns: .     - Patient is DNR/DNI Status: No         Postoperative Care            Comments:    Other Comments: Mask GA with Methohexitol            Chris Stiles MD

## 2023-07-25 ENCOUNTER — TELEPHONE (OUTPATIENT)
Dept: INTERNAL MEDICINE | Facility: CLINIC | Age: 68
End: 2023-07-25

## 2023-07-25 NOTE — TELEPHONE ENCOUNTER
July 25, 2023    [facility] Attending Physician's Statement - Progress Report was received via fax for Dr. Beltran to sign.  Patient label was attached to paperwork and placed in provider's inbox to be signed.    Syeda Melendez

## 2023-08-07 ENCOUNTER — OFFICE VISIT (OUTPATIENT)
Dept: INTERNAL MEDICINE | Facility: CLINIC | Age: 68
End: 2023-08-07
Payer: COMMERCIAL

## 2023-08-07 ENCOUNTER — ANCILLARY PROCEDURE (OUTPATIENT)
Dept: GENERAL RADIOLOGY | Facility: CLINIC | Age: 68
End: 2023-08-07
Attending: INTERNAL MEDICINE
Payer: COMMERCIAL

## 2023-08-07 VITALS
BODY MASS INDEX: 19.46 KG/M2 | TEMPERATURE: 97 F | RESPIRATION RATE: 14 BRPM | WEIGHT: 116.8 LBS | SYSTOLIC BLOOD PRESSURE: 126 MMHG | DIASTOLIC BLOOD PRESSURE: 72 MMHG | OXYGEN SATURATION: 98 % | HEART RATE: 60 BPM | HEIGHT: 65 IN

## 2023-08-07 DIAGNOSIS — E78.2 MIXED HYPERLIPIDEMIA: ICD-10-CM

## 2023-08-07 DIAGNOSIS — J41.0 SIMPLE CHRONIC BRONCHITIS (H): ICD-10-CM

## 2023-08-07 DIAGNOSIS — I10 ESSENTIAL HYPERTENSION: ICD-10-CM

## 2023-08-07 DIAGNOSIS — Z01.818 PREOPERATIVE EXAMINATION: Primary | ICD-10-CM

## 2023-08-07 DIAGNOSIS — I48.91 ATRIAL FIBRILLATION WITH RVR (H): ICD-10-CM

## 2023-08-07 DIAGNOSIS — I50.21 ACUTE SYSTOLIC CONGESTIVE HEART FAILURE (H): ICD-10-CM

## 2023-08-07 DIAGNOSIS — H91.93 BILATERAL DEAFNESS: ICD-10-CM

## 2023-08-07 DIAGNOSIS — K21.9 GASTROESOPHAGEAL REFLUX DISEASE WITHOUT ESOPHAGITIS: ICD-10-CM

## 2023-08-07 DIAGNOSIS — M54.6 BILATERAL THORACIC BACK PAIN: ICD-10-CM

## 2023-08-07 DIAGNOSIS — R06.02 SOB (SHORTNESS OF BREATH): ICD-10-CM

## 2023-08-07 DIAGNOSIS — F17.219 CIGARETTE NICOTINE DEPENDENCE WITH NICOTINE-INDUCED DISORDER: ICD-10-CM

## 2023-08-07 DIAGNOSIS — M54.6 ACUTE MIDLINE THORACIC BACK PAIN: ICD-10-CM

## 2023-08-07 DIAGNOSIS — I48.19 PERSISTENT ATRIAL FIBRILLATION (H): ICD-10-CM

## 2023-08-07 LAB
ANION GAP SERPL CALCULATED.3IONS-SCNC: 10 MMOL/L (ref 7–15)
BUN SERPL-MCNC: 20.8 MG/DL (ref 8–23)
CALCIUM SERPL-MCNC: 9.2 MG/DL (ref 8.8–10.2)
CHLORIDE SERPL-SCNC: 98 MMOL/L (ref 98–107)
CREAT SERPL-MCNC: 0.95 MG/DL (ref 0.51–0.95)
DEPRECATED HCO3 PLAS-SCNC: 29 MMOL/L (ref 22–29)
ERYTHROCYTE [DISTWIDTH] IN BLOOD BY AUTOMATED COUNT: 13.8 % (ref 10–15)
GFR SERPL CREATININE-BSD FRML MDRD: 65 ML/MIN/1.73M2
GLUCOSE SERPL-MCNC: 76 MG/DL (ref 70–99)
HCT VFR BLD AUTO: 45.3 % (ref 35–47)
HGB BLD-MCNC: 14.5 G/DL (ref 11.7–15.7)
MCH RBC QN AUTO: 28.2 PG (ref 26.5–33)
MCHC RBC AUTO-ENTMCNC: 32 G/DL (ref 31.5–36.5)
MCV RBC AUTO: 88 FL (ref 78–100)
PLATELET # BLD AUTO: 242 10E3/UL (ref 150–450)
POTASSIUM SERPL-SCNC: 4.7 MMOL/L (ref 3.4–5.3)
RBC # BLD AUTO: 5.14 10E6/UL (ref 3.8–5.2)
SODIUM SERPL-SCNC: 137 MMOL/L (ref 136–145)
WBC # BLD AUTO: 8.5 10E3/UL (ref 4–11)

## 2023-08-07 PROCEDURE — 72070 X-RAY EXAM THORAC SPINE 2VWS: CPT | Mod: TC | Performed by: STUDENT IN AN ORGANIZED HEALTH CARE EDUCATION/TRAINING PROGRAM

## 2023-08-07 PROCEDURE — 36415 COLL VENOUS BLD VENIPUNCTURE: CPT | Performed by: INTERNAL MEDICINE

## 2023-08-07 PROCEDURE — 80048 BASIC METABOLIC PNL TOTAL CA: CPT | Performed by: INTERNAL MEDICINE

## 2023-08-07 PROCEDURE — 71046 X-RAY EXAM CHEST 2 VIEWS: CPT | Mod: TC | Performed by: RADIOLOGY

## 2023-08-07 PROCEDURE — 99215 OFFICE O/P EST HI 40 MIN: CPT | Performed by: INTERNAL MEDICINE

## 2023-08-07 PROCEDURE — 85027 COMPLETE CBC AUTOMATED: CPT | Performed by: INTERNAL MEDICINE

## 2023-08-07 RX ORDER — FUROSEMIDE 20 MG
20 TABLET ORAL DAILY
Qty: 90 TABLET | Refills: 1 | Status: CANCELLED | OUTPATIENT
Start: 2023-08-07

## 2023-08-07 RX ORDER — PANTOPRAZOLE SODIUM 40 MG/1
40 TABLET, DELAYED RELEASE ORAL DAILY
Qty: 45 TABLET | Refills: 0 | Status: SHIPPED | OUTPATIENT
Start: 2023-08-07 | End: 2023-08-08

## 2023-08-07 RX ORDER — ATORVASTATIN CALCIUM 20 MG/1
20 TABLET, FILM COATED ORAL DAILY
Qty: 90 TABLET | Refills: 3 | Status: CANCELLED | OUTPATIENT
Start: 2023-08-07

## 2023-08-07 RX ORDER — METOPROLOL SUCCINATE 50 MG/1
50 TABLET, EXTENDED RELEASE ORAL DAILY
Status: ON HOLD
Start: 2023-08-07 | End: 2023-08-21

## 2023-08-07 RX ORDER — ALBUTEROL SULFATE 90 UG/1
2 AEROSOL, METERED RESPIRATORY (INHALATION) EVERY 6 HOURS PRN
Qty: 18 G | Refills: 11 | Status: CANCELLED | OUTPATIENT
Start: 2023-08-07

## 2023-08-07 RX ORDER — AMIODARONE HYDROCHLORIDE 200 MG/1
TABLET ORAL
Qty: 100 TABLET | Refills: 3 | Status: CANCELLED | OUTPATIENT
Start: 2023-08-07

## 2023-08-07 RX ORDER — LOSARTAN POTASSIUM 50 MG/1
50 TABLET ORAL DAILY
Qty: 90 TABLET | Refills: 4 | Status: CANCELLED | OUTPATIENT
Start: 2023-08-07

## 2023-08-07 ASSESSMENT — PAIN SCALES - GENERAL: PAINLEVEL: NO PAIN (0)

## 2023-08-07 NOTE — PROGRESS NOTES
Preoperative Consultation   Verena Lagunas   68 year old  female    Date of visit: 8/7/2023  Physician: Tomasz Beltran MD    This is a preoperative consultation requested by Dr. Briscoe in preparation for afib ablation on 8/21/23 at [facility], fax [phone].       Assessment and Plan   Verena Lagunas was seen in preoperative consultation in preparation for afib ablation.  This is a low risk surgery and the patient has increased risk for major cardiac complications based on a history of congestive heart failure. Please note she will stop her amiodarone as instructed prior to surgery.  She will start pantoprazole 3 days before surgery as instructed.  She will take Eliquis as instructed prior to surgery.  She can continue her other medications through surgery and she has no cardiopulmonary contraindication to proposed surgery.  I did discuss ischemic evaluation with Dr. Briscoe prior to the procedure and he felt that her cardiomyopathy is more likely related to atrial fibrillation and ischemia and felt comfortable proceeding prior to any cardiac restratification.    1. Preoperative examination    2. Persistent atrial fibrillation (H)    3. Acute systolic congestive heart failure (H)    4. Mixed hyperlipidemia    5. Hypertension    6. Simple chronic bronchitis (H)    7. Gastroesophageal reflux disease without esophagitis    8. Cigarette nicotine dependence with nicotine-induced disorder    9. Bilateral deafness         Patient Profile   Social History     Social History Narrative    [place]  [place]yn[place].  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  No children.          Past Medical History   Patient Active Problem List   Diagnosis    Allergic Rhinitis    Mixed hyperlipidemia    Hypertension    Mammogram Right Breast Diffuse Calcification    Age-related osteoporosis without  current pathological fracture    Vitamin D deficiency    Bilateral deafness    Lung nodules    Gastroesophageal reflux disease without esophagitis    Cigarette nicotine dependence with nicotine-induced disorder    History of Clostridioides difficile colitis    Simple chronic bronchitis (H)    Persistent atrial fibrillation (H)    Acute systolic congestive heart failure (H)       Past Surgical History  She has a past surgical history that includes Ovarian Cyst Removal (Right, 1970).     History of Present Illness   This 68 year old woman comes in for preoperative evaluation.  She has atrial fibrillation with associated cardiomyopathy, systolic congestive heart failure.  She underwent cardioversion and is on amiodarone.  She has been maintained on Eliquis for anticoagulation.  She continues to be short of breath although she has some chronic shortness of breath and underlying emphysema.  She has quit smoking.  She has been having some pain when she takes a deep breath across the mid part of her upper back.    Recent antiplatelet use: yes on anticoagulation (no antiplatelet)  Personal or family history of bleeding or clotting disorders: no  Steroid use in the past year: yes intermittent for COPD, no in the last month  Personal or family history of difficulty with anesthesia: no  Current cardiopulmonary symptoms: yes see above  ROBERT: no    Review of Systems: A comprehensive review of systems was negative except as noted.     Medications and Allergies   Current Outpatient Medications   Medication Sig Dispense Refill    albuterol (PROAIR HFA/PROVENTIL HFA/VENTOLIN HFA) 108 (90 Base) MCG/ACT inhaler Inhale 2 puffs into the lungs every 6 hours as needed for shortness of breath, wheezing or cough 18 g 11    amiodarone (PACERONE) 200 MG tablet Take 400mg twice daily for 10 days, then 200mg daily 100 tablet 3    apixaban ANTICOAGULANT (ELIQUIS ANTICOAGULANT) 5 MG tablet Take 1 tablet (5 mg) by mouth 2 times daily 180 tablet 3  "   atorvastatin (LIPITOR) 20 MG tablet Take 1 tablet (20 mg) by mouth daily 90 tablet 3    cholecalciferol, vitamin D3, (VITAMIN D3) 1,000 unit capsule [CHOLECALCIFEROL, VITAMIN D3, (VITAMIN D3) 1,000 UNIT CAPSULE] Take 1 capsule (1,000 Units total) by mouth daily.  0    furosemide (LASIX) 20 MG tablet Take 1 tablet (20 mg) by mouth daily 90 tablet 1    losartan (COZAAR) 50 MG tablet Take 1 tablet (50 mg) by mouth daily 90 tablet 4    metoprolol succinate ER (TOPROL XL) 25 MG 24 hr tablet Take 1 tablet (25 mg) by mouth daily 90 tablet 3     Allergies   Allergen Reactions    Cat Hair Std Allergenic Ext [Cat Hair Extract] Unknown    Penicillins Shortness Of Breath        Family and Social History   Family History   Problem Relation Age of Onset    Colon Cancer Mother 79            Lung Cancer Father 50            No Known Problems Sister     Other - See Comments Brother         6 brothers and 1 sister    Cancer Brother     Breast Cancer Maternal Aunt 75        Social History     Tobacco Use    Smoking status: Former     Packs/day: 0.50     Types: Cigarettes     Quit date: 2023     Years since quittin.2     Passive exposure: Never    Smokeless tobacco: Never   Vaping Use    Vaping Use: Never used   Substance Use Topics    Alcohol use: No     Comment: Alcoholic Drinks/day: rare    Drug use: No        Physical Exam   General Appearance:   No acute distress    /72 (BP Location: Left arm, Patient Position: Sitting, Cuff Size: Adult Regular)   Pulse 60   Temp 97  F (36.1  C)   Resp 14   Ht 1.651 m (5' 5\")   Wt 53 kg (116 lb 12.8 oz)   LMP  (LMP Unknown)   SpO2 98%   BMI 19.44 kg/m      EYES: Eyelids, conjunctiva, and sclera were normal. Pupils were normal. Cornea, iris, and lens were normal bilaterally.  HEAD, EARS, NOSE, MOUTH, AND THROAT: Head and face were normal. Hearing was normal to voice and the ears were normal to external exam. Nose appearance was normal and there was no " discharge. Oropharynx was normal.  NECK: Neck appearance was normal. There were no neck masses and the thyroid was not enlarged.  RESPIRATORY: Breathing pattern was normal and the chest moved symmetrically.  Percussion/auscultatory percussion was normal.  Lung sounds were diminished with some faint crackles in the left base  CARDIOVASCULAR: Heart rate and rhythm were normal.  S1 and S2 were normal and there were no extra sounds or murmurs. Peripheral pulses in arms and legs were normal.   There was no peripheral edema.  NEUROLOGIC: The patient was alert and oriented to person, place, time, and circumstance. Speech was normal. Cranial nerves were normal. Motor strength was normal for age. The patient was normally coordinated.  PSYCHIATRIC:  Mood and affect were normal and the patient had normal recent and remote memory. The patient's judgment and insight were normal.       Additional Tests   Laboratory: Basic metabolic panel and CBC are pending    Radiology: Reviewed    Electrocardiogram: Reviewed    Total time 40 minutes including chart review, history, examination, counseling and documentation.   was used for this visit     Tomasz Beltran MD  Internal Medicine  Contact me at [phone]

## 2023-08-08 DIAGNOSIS — Z01.818 PREOPERATIVE EXAMINATION: ICD-10-CM

## 2023-08-08 RX ORDER — PANTOPRAZOLE SODIUM 40 MG/1
40 TABLET, DELAYED RELEASE ORAL DAILY
Qty: 90 TABLET | Refills: 0 | Status: SHIPPED | OUTPATIENT
Start: 2023-08-08 | End: 2023-10-02

## 2023-08-08 NOTE — TELEPHONE ENCOUNTER
"Routing refill request to provider for review/approval because:  Drug not on the [facility] refill protocol     Last Written Prescription Date:  8/7/23  Last Fill Quantity: 45,  # refills: 0   Last office visit provider:  8/7/23     Requested Prescriptions   Pending Prescriptions Disp Refills    pantoprazole (PROTONIX) 40 MG EC tablet 90 tablet 0     Sig: Take 1 tablet (40 mg) by mouth daily       PPI Protocol Failed - 8/8/2023  2:57 PM        Failed - No diagnosis of osteoporosis on record        Passed - Not on Clopidogrel (unless Pantoprazole ordered)        Passed - Recent (12 mo) or future (30 days) visit within the authorizing provider's specialty     Patient has had an office visit with the authorizing provider or a provider within the authorizing providers department within the previous 12 mos or has a future within next 30 days. See \"Patient Info\" tab in inbasket, or \"Choose Columns\" in Meds & Orders section of the refill encounter.              Passed - Medication is active on med list        Passed - Patient is age 18 or older        Passed - No active pregnacy on record        Passed - No positive pregnancy test in past 12 months             Amaris Garvey 08/08/23 3:52 PM  "

## 2023-08-16 ENCOUNTER — OFFICE VISIT (OUTPATIENT)
Dept: CARDIOLOGY | Facility: CLINIC | Age: 68
End: 2023-08-16
Payer: COMMERCIAL

## 2023-08-16 ENCOUNTER — LAB (OUTPATIENT)
Dept: CARDIOLOGY | Facility: CLINIC | Age: 68
End: 2023-08-16
Payer: COMMERCIAL

## 2023-08-16 ENCOUNTER — ALLIED HEALTH/NURSE VISIT (OUTPATIENT)
Dept: CARDIOLOGY | Facility: CLINIC | Age: 68
End: 2023-08-16
Payer: COMMERCIAL

## 2023-08-16 ENCOUNTER — PREP FOR PROCEDURE (OUTPATIENT)
Dept: CARDIOLOGY | Facility: CLINIC | Age: 68
End: 2023-08-16

## 2023-08-16 VITALS
DIASTOLIC BLOOD PRESSURE: 60 MMHG | WEIGHT: 117.4 LBS | SYSTOLIC BLOOD PRESSURE: 120 MMHG | HEIGHT: 66 IN | HEART RATE: 59 BPM | BODY MASS INDEX: 18.87 KG/M2 | RESPIRATION RATE: 16 BRPM

## 2023-08-16 DIAGNOSIS — I10 ESSENTIAL HYPERTENSION: ICD-10-CM

## 2023-08-16 DIAGNOSIS — I48.91 ATRIAL FIBRILLATION WITH RVR (H): Primary | ICD-10-CM

## 2023-08-16 DIAGNOSIS — I50.22 CHRONIC SYSTOLIC HEART FAILURE (H): ICD-10-CM

## 2023-08-16 DIAGNOSIS — I42.9 SECONDARY CARDIOMYOPATHY (H): ICD-10-CM

## 2023-08-16 DIAGNOSIS — H91.93 BILATERAL DEAFNESS: ICD-10-CM

## 2023-08-16 DIAGNOSIS — I48.19 PERSISTENT ATRIAL FIBRILLATION (H): Primary | ICD-10-CM

## 2023-08-16 LAB
ANION GAP SERPL CALCULATED.3IONS-SCNC: 7 MMOL/L (ref 7–15)
BUN SERPL-MCNC: 15.9 MG/DL (ref 8–23)
CALCIUM SERPL-MCNC: 9.4 MG/DL (ref 8.8–10.2)
CHLORIDE SERPL-SCNC: 100 MMOL/L (ref 98–107)
CREAT SERPL-MCNC: 1.05 MG/DL (ref 0.51–0.95)
DEPRECATED HCO3 PLAS-SCNC: 30 MMOL/L (ref 22–29)
ERYTHROCYTE [DISTWIDTH] IN BLOOD BY AUTOMATED COUNT: 13.8 % (ref 10–15)
GFR SERPL CREATININE-BSD FRML MDRD: 57 ML/MIN/1.73M2
GLUCOSE SERPL-MCNC: 82 MG/DL (ref 70–99)
HCT VFR BLD AUTO: 42.5 % (ref 35–47)
HGB BLD-MCNC: 13.3 G/DL (ref 11.7–15.7)
MCH RBC QN AUTO: 27.8 PG (ref 26.5–33)
MCHC RBC AUTO-ENTMCNC: 31.3 G/DL (ref 31.5–36.5)
MCV RBC AUTO: 89 FL (ref 78–100)
PLATELET # BLD AUTO: 255 10E3/UL (ref 150–450)
POTASSIUM SERPL-SCNC: 4.4 MMOL/L (ref 3.4–5.3)
RBC # BLD AUTO: 4.78 10E6/UL (ref 3.8–5.2)
SODIUM SERPL-SCNC: 137 MMOL/L (ref 136–145)
WBC # BLD AUTO: 8.1 10E3/UL (ref 4–11)

## 2023-08-16 PROCEDURE — T1013 SIGN LANG/ORAL INTERPRETER: HCPCS | Mod: U3

## 2023-08-16 PROCEDURE — 99207 PR NO CHARGE NURSE ONLY: CPT

## 2023-08-16 PROCEDURE — 36415 COLL VENOUS BLD VENIPUNCTURE: CPT

## 2023-08-16 PROCEDURE — 85027 COMPLETE CBC AUTOMATED: CPT

## 2023-08-16 PROCEDURE — 93000 ELECTROCARDIOGRAM COMPLETE: CPT | Performed by: INTERNAL MEDICINE

## 2023-08-16 PROCEDURE — 80048 BASIC METABOLIC PNL TOTAL CA: CPT

## 2023-08-16 PROCEDURE — 99215 OFFICE O/P EST HI 40 MIN: CPT | Performed by: NURSE PRACTITIONER

## 2023-08-16 RX ORDER — LIDOCAINE 40 MG/G
CREAM TOPICAL
Status: CANCELLED | OUTPATIENT
Start: 2023-08-16

## 2023-08-16 RX ORDER — SODIUM CHLORIDE 9 MG/ML
100 INJECTION, SOLUTION INTRAVENOUS CONTINUOUS
Status: CANCELLED | OUTPATIENT
Start: 2023-08-21

## 2023-08-16 NOTE — PATIENT INSTRUCTIONS
Verena Lagunas,    It was a pleasure to see you today at the [facility].     Before ablation:   -Continue your Eliquis as ordered for stroke prevention. Take this with a sip of water on the morning of your procedure.   -On 8/18/2023, start pantoprazole 40mg by mouth once a day. We will continue this medication for the next 6 weeks after ablation.     After ablation:   -You should arrange for someone to stay with you for the first 24 hours after discharge due to receiving sedating medications.   -Continue your blood thinner before and after the ablation procedure for stroke prevention.   -No driving for 3 days after your procedure. No lifting more than 10-20 pounds or strenuous exercise for 3-5 days after your procedure to allow for groin site healing.   -Short episodes of atrial fibrillation can be common after your procedure. Call the office if episodes are lasting longer than 4 hours.  -Call the office if you are experiencing increasing bleeding or pain at groin sites, lump that is larger than a walnut, or fever.   -Dial 911 if you have any NEW signs or symptoms of a stroke, including but not limited to injuries in vision, problems talking, numbness on one side of your face or body, sudden headache, confusion, or problems with walking.  -Do not hesitate to call the office with additional questions or concerns.    Carmen Plaza, CNP  Clinical Cardiac Electrophysiology  [facility]  Office: [phone]  Fax: [phone]   Nurses: [phone]

## 2023-08-16 NOTE — H&P (VIEW-ONLY)
[facility]  Cardiac Electrophysiology  [address]  [place] [address]   Office: [phone]  Fax: [phone]     HEART CARE ELECTROPHYSIOLOGY NOTE     Primary Care: Tomasz Beltran MD     Assessment/Recommendations     Persistent atrial fibrillation: We discussed pulmonary vein isolation ablation procedure including <1-2% risk for major complication, anticipated success rates, recovery and follow-up.  Medical and surgical history reviewed and updated. Current medications and allergies reviewed and updated as appropriate. No personal or family history of adverse reactions to anesthesia or abnormal bleeding with surgery  -Procedure date 8/21/2023  -Hold amiodarone prior to ablation with last dose on 8/13/2023, will likely be resumed following ablation  -Started on Protonix 40mg with PMD earlier this month, to continue for 6 weeks following ablation thereafter at discretion of Dr. Beltran    [id number] score of 4 for age 65-74, female gender, cardiomyopathy and hypertension  -Continue Eliquis 5 mg twice a day for stroke prophylaxis. She confirms no missed doses in the past 1 month.  She denies bleeding complications    Tachycardia-mediated cardiomyopathy, HFrEF: Orthopnea, shortness of breath with exertion and fatigue.  LVEF 20-25% with severe LV hypokinesia, moderate RV systolic dysfunction, mild/moderate MR, mild pulmonary hypertension. Compensated without overt hypervolemia today  -Arrhythmia management as above  -General cardiology follow-up pending  -Continue furosemide, beta-blocker, ARB    Essential hypertension: Well managed on current regimen    Hearing impairment:  present throughout visit    Follow up: via telephone call with EP RN on 8/24/2023,6 week clinic follow-up with myself 10/2/23     History of Present Illness/Subjective    Verena Lagunas is a 68 year old female who is seen today for history and physical prior to atrial fibrillation  ablation. She additionally has a past medical history significant for HFrEF, essential hypertension, hyperlipidemia, prior nicotine use, COPD, hearing impairment.  She was diagnosed with AF with RVR May 2023, symptomatic with dyspnea and concurrent cardiomyopathy with severely reduced LVEF.  She underwent DCCV 2023 after amiodarone loading regimen.  She is undergoing catheter ablation for further arrhythmia management.  She has intermittent tachypalpitations, dizziness and chest discomfort.  She gets short of breath with exertion which is somewhat improved since she quit smoking about 3 months ago.  She developed worsening balance and tremors with amiodarone.  She denies syncope.       Data Review     Arrhythmia hx:   Dx/date: Persistent AF with RVR May 2023, onset of progressive dyspnea 1 month prior.  TTE significant for LVEF 20-25%, severe LV hypokinesia, mild RV systolic dysfunction, severe LAE.  DCCV 2023 after amiodarone loading regimen.   Sx: Dyspnea  Prior AAD, AV steve blocking agents: Amiodarone, metoprolol  Procedures  DCCV: 2023  Ablation: 2023, Dr. Briscoe    EK2023: SB 59 bpm, occasional ventricular ectopy, T wave inversion to inferorolateral leads c/w prior, QRS 96 ms, QT/QTc 434/429 ms  2023: Sinus rhythm 69 bpm, occasional atrial ectopy, T wave inversion to inferolateral leads,  ms, QT/QTc 402/430 ms  2023: AF with  bpm, QRS 90 ms, QT/QTc 324/493 ms  Personally reviewed.     TTE: 2023  The left ventricle is mildly dilated.  Left ventricular function is decreased. The ejection fraction is 20-25%  (severely reduced).  Left ventricular diastolic function is abnormal.  There is severe global hypokinesia of the left ventricle.  The right ventricle is mildly dilated. Moderately decreased right ventricular  systolic function  The left atrium is severely dilated. The right atrium is moderately dilated.  Mild mitral valve prolapse, bileaflet. There is  "mild to moderate (1-2+) mitral  regurgitation.  Right ventricular systolic pressure is elevated, consistent with mild  pulmonary hypertension.    I have reviewed and updated the patient's past medical history, allergies and medication list.               Physical Examination Review of Systems   /60 (BP Location: Right arm, Patient Position: Sitting, Cuff Size: Adult Regular)   Pulse 59   Resp 16   Ht 1.676 m (5' 6\")   Wt 53.3 kg (117 lb 6.4 oz)   LMP  (LMP Unknown)   BMI 18.95 kg/m      Body mass index is 18.95 kg/m .    Wt Readings from Last 3 Encounters:   23 53.3 kg (117 lb 6.4 oz)   23 53 kg (116 lb 12.8 oz)   23 53.2 kg (117 lb 4.6 oz)     General   Appearance:   Alert and oriented, in no acute distress.    HEENT:  Normocephalic and atraumatic. Conjunctiva and sclera are clear. Moist oral mucosa.    Neck: No JVP, carotid bruit or obvious thyromegaly.   Lungs:   Respirations unlabored. Clear bilaterally with no rales, rhonchi, or wheezes though somewhat diminished throughout   Cardiovascular:   Rhythm is regular. S1 and S2 are normal. No significant murmur is present. Lower extremities demonstrate mild nonpitting edema. Posterior tibial pulses are palpable though diminished bilaterally   Extremities: cyanosis to bilateral toes   Skin: Skin is dry and intact   Neurologic: Gait not asssessed. Mood and affect appropriate.                                                Medical History  Surgical History Family History Social History   Past Medical History:   Diagnosis Date    Allergic rhinitis     Hypertension     Tobacco abuse     Past Surgical History:   Procedure Laterality Date    OVARIAN CYST REMOVAL Right     Family History   Problem Relation Age of Onset    Colon Cancer Mother 79            Lung Cancer Father 50            No Known Problems Sister     Other - See Comments Brother         6 brothers and 1 sister    Cancer Brother     Breast Cancer Maternal Aunt " 75    Social History     Tobacco Use    Smoking status: Former     Packs/day: 0.50     Types: Cigarettes     Quit date: 2023     Years since quittin.2     Passive exposure: Never    Smokeless tobacco: Never   Vaping Use    Vaping Use: Never used   Substance Use Topics    Alcohol use: No     Comment: Alcoholic Drinks/day: rare    Drug use: No          Medications  Allergies   Current Outpatient Medications   Medication Sig Dispense Refill    albuterol (PROAIR HFA/PROVENTIL HFA/VENTOLIN HFA) 108 (90 Base) MCG/ACT inhaler Inhale 2 puffs into the lungs every 6 hours as needed for shortness of breath, wheezing or cough 18 g 11    apixaban ANTICOAGULANT (ELIQUIS ANTICOAGULANT) 5 MG tablet Take 1 tablet (5 mg) by mouth 2 times daily 180 tablet 3    atorvastatin (LIPITOR) 20 MG tablet Take 1 tablet (20 mg) by mouth daily 90 tablet 3    cholecalciferol, vitamin D3, (VITAMIN D3) 1,000 unit capsule [CHOLECALCIFEROL, VITAMIN D3, (VITAMIN D3) 1,000 UNIT CAPSULE] Take 1 capsule (1,000 Units total) by mouth daily.  0    furosemide (LASIX) 20 MG tablet Take 1 tablet (20 mg) by mouth daily 90 tablet 1    losartan (COZAAR) 50 MG tablet Take 1 tablet (50 mg) by mouth daily 90 tablet 4    metoprolol succinate ER (TOPROL XL) 50 MG 24 hr tablet Take 1 tablet (50 mg) by mouth daily      pantoprazole (PROTONIX) 40 MG EC tablet Take 1 tablet (40 mg) by mouth daily 90 tablet 0    amiodarone (PACERONE) 200 MG tablet Take 400mg twice daily for 10 days, then 200mg daily (Patient not taking: Reported on 2023) 100 tablet 3    Allergies   Allergen Reactions    Cat Hair Std Allergenic Ext [Cat Hair Extract] Unknown    Penicillins Shortness Of Breath     No prior use GA.       Lab Results    Chemistry/lipid CBC Cardiac Enzymes/BNP/TSH/INR   Lab Results   Component Value Date    BUN 20.8 2023     2023    CO2 29 2023     No results found for: CREATININE    Lab Results   Component Value Date    CHOL 171 10/04/2022     HDL 81 10/04/2022    LDL 77 10/04/2022      Lab Results   Component Value Date    WBC 8.5 2023    HGB 14.5 2023    HCT 45.3 2023    MCV 88 2023     2023    Lab Results   Component Value Date     (H) 2023    TSH 0.61 2023        40 minutes spent reviewing prior records (including documentation, laboratory studies, cardiac testing/imaging), history and physical exam, planning, and subsequent documentation.     This note has been dictated using voice recognition software. Any grammatical, typographical, or context distortions are unintentional and inherent to the software.    Carmen Plaza, CNP  Clinical Cardiac Electrophysiology  [facility]  Clinic and [phone]  Fax: [phone]  Electrophysiology Nurses: [phone]

## 2023-08-16 NOTE — PROGRESS NOTES
[facility]  Cardiac Electrophysiology  [address]  [place] [address]   Office: [phone]  Fax: [phone]     HEART CARE ELECTROPHYSIOLOGY NOTE     Primary Care: Tomasz Beltran MD     Assessment/Recommendations     Persistent atrial fibrillation: We discussed pulmonary vein isolation ablation procedure including <1-2% risk for major complication, anticipated success rates, recovery and follow-up.  Medical and surgical history reviewed and updated. Current medications and allergies reviewed and updated as appropriate. No personal or family history of adverse reactions to anesthesia or abnormal bleeding with surgery  -Procedure date 8/21/2023  -Hold amiodarone prior to ablation with last dose on 8/13/2023, will likely be resumed following ablation  -Started on Protonix 40mg with PMD earlier this month, to continue for 6 weeks following ablation thereafter at discretion of Dr. Beltran    [id number] score of 4 for age 65-74, female gender, cardiomyopathy and hypertension  -Continue Eliquis 5 mg twice a day for stroke prophylaxis. She confirms no missed doses in the past 1 month.  She denies bleeding complications    Tachycardia-mediated cardiomyopathy, HFrEF: Orthopnea, shortness of breath with exertion and fatigue.  LVEF 20-25% with severe LV hypokinesia, moderate RV systolic dysfunction, mild/moderate MR, mild pulmonary hypertension. Compensated without overt hypervolemia today  -Arrhythmia management as above  -General cardiology follow-up pending  -Continue furosemide, beta-blocker, ARB    Essential hypertension: Well managed on current regimen    Hearing impairment:  present throughout visit    Follow up: via telephone call with EP RN on 8/24/2023,6 week clinic follow-up with myself 10/2/23     History of Present Illness/Subjective    Verena Lagunas is a 68 year old female who is seen today for history and physical prior to atrial fibrillation  ablation. She additionally has a past medical history significant for HFrEF, essential hypertension, hyperlipidemia, prior nicotine use, COPD, hearing impairment.  She was diagnosed with AF with RVR May 2023, symptomatic with dyspnea and concurrent cardiomyopathy with severely reduced LVEF.  She underwent DCCV 2023 after amiodarone loading regimen.  She is undergoing catheter ablation for further arrhythmia management.  She has intermittent tachypalpitations, dizziness and chest discomfort.  She gets short of breath with exertion which is somewhat improved since she quit smoking about 3 months ago.  She developed worsening balance and tremors with amiodarone.  She denies syncope.       Data Review     Arrhythmia hx:   Dx/date: Persistent AF with RVR May 2023, onset of progressive dyspnea 1 month prior.  TTE significant for LVEF 20-25%, severe LV hypokinesia, mild RV systolic dysfunction, severe LAE.  DCCV 2023 after amiodarone loading regimen.   Sx: Dyspnea  Prior AAD, AV steve blocking agents: Amiodarone, metoprolol  Procedures  DCCV: 2023  Ablation: 2023, Dr. Briscoe    EK2023: SB 59 bpm, occasional ventricular ectopy, T wave inversion to inferorolateral leads c/w prior, QRS 96 ms, QT/QTc 434/429 ms  2023: Sinus rhythm 69 bpm, occasional atrial ectopy, T wave inversion to inferolateral leads,  ms, QT/QTc 402/430 ms  2023: AF with  bpm, QRS 90 ms, QT/QTc 324/493 ms  Personally reviewed.     TTE: 2023  The left ventricle is mildly dilated.  Left ventricular function is decreased. The ejection fraction is 20-25%  (severely reduced).  Left ventricular diastolic function is abnormal.  There is severe global hypokinesia of the left ventricle.  The right ventricle is mildly dilated. Moderately decreased right ventricular  systolic function  The left atrium is severely dilated. The right atrium is moderately dilated.  Mild mitral valve prolapse, bileaflet. There is  "mild to moderate (1-2+) mitral  regurgitation.  Right ventricular systolic pressure is elevated, consistent with mild  pulmonary hypertension.    I have reviewed and updated the patient's past medical history, allergies and medication list.               Physical Examination Review of Systems   /60 (BP Location: Right arm, Patient Position: Sitting, Cuff Size: Adult Regular)   Pulse 59   Resp 16   Ht 1.676 m (5' 6\")   Wt 53.3 kg (117 lb 6.4 oz)   LMP  (LMP Unknown)   BMI 18.95 kg/m      Body mass index is 18.95 kg/m .    Wt Readings from Last 3 Encounters:   23 53.3 kg (117 lb 6.4 oz)   23 53 kg (116 lb 12.8 oz)   23 53.2 kg (117 lb 4.6 oz)     General   Appearance:   Alert and oriented, in no acute distress.    HEENT:  Normocephalic and atraumatic. Conjunctiva and sclera are clear. Moist oral mucosa.    Neck: No JVP, carotid bruit or obvious thyromegaly.   Lungs:   Respirations unlabored. Clear bilaterally with no rales, rhonchi, or wheezes though somewhat diminished throughout   Cardiovascular:   Rhythm is regular. S1 and S2 are normal. No significant murmur is present. Lower extremities demonstrate mild nonpitting edema. Posterior tibial pulses are palpable though diminished bilaterally   Extremities: cyanosis to bilateral toes   Skin: Skin is dry and intact   Neurologic: Gait not asssessed. Mood and affect appropriate.                                                Medical History  Surgical History Family History Social History   Past Medical History:   Diagnosis Date    Allergic rhinitis     Hypertension     Tobacco abuse     Past Surgical History:   Procedure Laterality Date    OVARIAN CYST REMOVAL Right     Family History   Problem Relation Age of Onset    Colon Cancer Mother 79            Lung Cancer Father 50            No Known Problems Sister     Other - See Comments Brother         6 brothers and 1 sister    Cancer Brother     Breast Cancer Maternal Aunt " 75    Social History     Tobacco Use    Smoking status: Former     Packs/day: 0.50     Types: Cigarettes     Quit date: 2023     Years since quittin.2     Passive exposure: Never    Smokeless tobacco: Never   Vaping Use    Vaping Use: Never used   Substance Use Topics    Alcohol use: No     Comment: Alcoholic Drinks/day: rare    Drug use: No          Medications  Allergies   Current Outpatient Medications   Medication Sig Dispense Refill    albuterol (PROAIR HFA/PROVENTIL HFA/VENTOLIN HFA) 108 (90 Base) MCG/ACT inhaler Inhale 2 puffs into the lungs every 6 hours as needed for shortness of breath, wheezing or cough 18 g 11    apixaban ANTICOAGULANT (ELIQUIS ANTICOAGULANT) 5 MG tablet Take 1 tablet (5 mg) by mouth 2 times daily 180 tablet 3    atorvastatin (LIPITOR) 20 MG tablet Take 1 tablet (20 mg) by mouth daily 90 tablet 3    cholecalciferol, vitamin D3, (VITAMIN D3) 1,000 unit capsule [CHOLECALCIFEROL, VITAMIN D3, (VITAMIN D3) 1,000 UNIT CAPSULE] Take 1 capsule (1,000 Units total) by mouth daily.  0    furosemide (LASIX) 20 MG tablet Take 1 tablet (20 mg) by mouth daily 90 tablet 1    losartan (COZAAR) 50 MG tablet Take 1 tablet (50 mg) by mouth daily 90 tablet 4    metoprolol succinate ER (TOPROL XL) 50 MG 24 hr tablet Take 1 tablet (50 mg) by mouth daily      pantoprazole (PROTONIX) 40 MG EC tablet Take 1 tablet (40 mg) by mouth daily 90 tablet 0    amiodarone (PACERONE) 200 MG tablet Take 400mg twice daily for 10 days, then 200mg daily (Patient not taking: Reported on 2023) 100 tablet 3    Allergies   Allergen Reactions    Cat Hair Std Allergenic Ext [Cat Hair Extract] Unknown    Penicillins Shortness Of Breath     No prior use GA.       Lab Results    Chemistry/lipid CBC Cardiac Enzymes/BNP/TSH/INR   Lab Results   Component Value Date    BUN 20.8 2023     2023    CO2 29 2023     No results found for: CREATININE    Lab Results   Component Value Date    CHOL 171 10/04/2022     HDL 81 10/04/2022    LDL 77 10/04/2022      Lab Results   Component Value Date    WBC 8.5 2023    HGB 14.5 2023    HCT 45.3 2023    MCV 88 2023     2023    Lab Results   Component Value Date     (H) 2023    TSH 0.61 2023        40 minutes spent reviewing prior records (including documentation, laboratory studies, cardiac testing/imaging), history and physical exam, planning, and subsequent documentation.     This note has been dictated using voice recognition software. Any grammatical, typographical, or context distortions are unintentional and inherent to the software.    Carmen Plaza, CNP  Clinical Cardiac Electrophysiology  [facility]  Clinic and [phone]  Fax: [phone]  Electrophysiology Nurses: [phone]

## 2023-08-16 NOTE — PROGRESS NOTES
Pre-Procedure Pulmonary Vein Ablation (AF) Education    Procedure: PVI with Dr Briscoe on 8/21/23 with arrival time 10:00 am    COVID: Pt denies COVID like symptoms, and is aware if he/she develops COVID like symptoms they would need to complete an at home with a rapid antigen COVID test 1-2 days prior to your procedure date. If COVID + pt is aware the procedure will need to be rescheduled, and to contact CV scheduling as soon as possible    Type & Screen: Is not required for PVI Ablation    Pre-Op H&P: Completed today with EP DOYLE- See record in Epic    Education:   Reviewed with pt in Clinic today  Pre-Procedure Instruction: NPO after midnight pre procedure, Defined NPO, Remove all jewelry and leave all valuables at home, Shower prior to arrival, Anesthesia and intubation plan/orders, Intra-procedure PVI process, Post- PVI procedure expectations/recovery, Transportation requirements and arrangements post procedure, Post-procedure follow up process, Letter sent to pt via [employer] and mail with written instructions (Refer to letters tab), Lab results would be called to pt if abnormal  Risks Reviewed:   Pulmonary Vein/AF/Radiofrequency Ablation  In addition to standard risks for Radiofrequency Ablation, there is:  <2% for significant pulmonary vein stenosis  <2% risk for embolic events  <1% risk for esophageal fistula  <1% risk death    Pulmonary Vein Isolation / Cryoablation Risks:  1-2% risk for phrenic nerve paralysis  <1% risk for pulmonary vein stenosis  Risk of esophageal irritation with no incidence of atrial-esophageal fistula  Rare cryoballoon rupture  <1% risk death       Cardiac Catheter Ablation  <1% risk for the following: hypotension, hemorrhage, vascular injury including perforation of vein, artery or heart, thrombophlebitis, systemic or pulmonic emboli; cardiac perforation, (tamponade), infection, pneumothorax, arrhythmias, proarrhythmic effects of drugs, radiation exposure.  1-2% complete heart block  (for AVNRT or septol accessory pathway).  <0.5% CVA or MI  <0.1% death  If external defibrillation is needed, 75% risk for superficial burn.  1-2% tamponade and aortic puncture with left sided transeptal approach for left side RAMIREZ - increase risk of CVA to <2%.  Late arrhythmia recurrences depends upon the primary rhythm disturbance.    Medication:   Instructions regarding anticoagulants: Eliquis- Continue anticoagulation uninterrupted through their procedure, do not miss any doses of AC prior to procedure, importance of taking AC for stroke prevention, taking AC as prescribed, to call prior to PVI if missed a dose of AC, and if upon arrival pt reports missing a dose of AC PVI will potentially be cnx/postponed  Instructions given to pt regarding antiarrhythmic medication: Amiodarone- hold 7 days prior to procedure  Instructions given to pt regarding PPI medication: Continue current PPI  Instructions for medication, other than anticoagulants and antiarrhythmics listed above, given to pt: hold all medication AM of procedure     Important patient information for staff:  pt is deaf, also will need to text  with updates.    8/16/2023 3:04 PM  Tamia Bowling RN

## 2023-08-16 NOTE — LETTER
8/16/2023    Tomasz Beltran MD  [address] [facility] [place] [address]    RE: Verena Lagunas       Dear Colleague,     I had the pleasure of seeing Verena Lagunas in the [facility].       [facility]  Cardiac Electrophysiology  [address]  [place] [address]   Office: [phone]  Fax: [phone]     HEART CARE ELECTROPHYSIOLOGY NOTE     Primary Care: Tomasz Beltran MD     Assessment/Recommendations     Persistent atrial fibrillation: We discussed pulmonary vein isolation ablation procedure including <1-2% risk for major complication, anticipated success rates, recovery and follow-up.  Medical and surgical history reviewed and updated. Current medications and allergies reviewed and updated as appropriate. No personal or family history of adverse reactions to anesthesia or abnormal bleeding with surgery  -Procedure date 8/21/2023  -Hold amiodarone prior to ablation with last dose on 8/13/2023, will likely be resumed following ablation  -Started on Protonix 40mg with PMD earlier this month, to continue for 6 weeks following ablation thereafter at discretion of Dr. Beltran    [id number] score of 4 for age 65-74, female gender, cardiomyopathy and hypertension  -Continue Eliquis 5 mg twice a day for stroke prophylaxis. She confirms no missed doses in the past 1 month.  She denies bleeding complications    Tachycardia-mediated cardiomyopathy, HFrEF: Orthopnea, shortness of breath with exertion and fatigue.  LVEF 20-25% with severe LV hypokinesia, moderate RV systolic dysfunction, mild/moderate MR, mild pulmonary hypertension. Compensated without overt hypervolemia today  -Arrhythmia management as above  -General cardiology follow-up pending  -Continue furosemide, beta-blocker, ARB    Essential hypertension: Well managed on current regimen    Hearing impairment:  present throughout visit    Follow up: via telephone call with EP RN on 8/24/2023,6  week clinic follow-up with myself 10/2/23     History of Present Illness/Subjective    Verena Lagunas is a 68 year old female who is seen today for history and physical prior to atrial fibrillation ablation. She additionally has a past medical history significant for HFrEF, essential hypertension, hyperlipidemia, prior nicotine use, COPD, hearing impairment.  She was diagnosed with AF with RVR May 2023, symptomatic with dyspnea and concurrent cardiomyopathy with severely reduced LVEF.  She underwent DCCV 2023 after amiodarone loading regimen.  She is undergoing catheter ablation for further arrhythmia management.  She has intermittent tachypalpitations, dizziness and chest discomfort.  She gets short of breath with exertion which is somewhat improved since she quit smoking about 3 months ago.  She developed worsening balance and tremors with amiodarone.  She denies syncope.       Data Review     Arrhythmia hx:   Dx/date: Persistent AF with RVR May 2023, onset of progressive dyspnea 1 month prior.  TTE significant for LVEF 20-25%, severe LV hypokinesia, mild RV systolic dysfunction, severe LAE.  DCCV 2023 after amiodarone loading regimen.   Sx: Dyspnea  Prior AAD, AV steve blocking agents: Amiodarone, metoprolol  Procedures  DCCV: 2023  Ablation: 2023, Dr. Briscoe    EK2023: SB 59 bpm, occasional ventricular ectopy, T wave inversion to inferorolateral leads c/w prior, QRS 96 ms, QT/QTc 434/429 ms  2023: Sinus rhythm 69 bpm, occasional atrial ectopy, T wave inversion to inferolateral leads,  ms, QT/QTc 402/430 ms  2023: AF with  bpm, QRS 90 ms, QT/QTc 324/493 ms  Personally reviewed.     TTE: 2023  The left ventricle is mildly dilated.  Left ventricular function is decreased. The ejection fraction is 20-25%  (severely reduced).  Left ventricular diastolic function is abnormal.  There is severe global hypokinesia of the left ventricle.  The right ventricle is  "mildly dilated. Moderately decreased right ventricular  systolic function  The left atrium is severely dilated. The right atrium is moderately dilated.  Mild mitral valve prolapse, bileaflet. There is mild to moderate (1-2+) mitral  regurgitation.  Right ventricular systolic pressure is elevated, consistent with mild  pulmonary hypertension.    I have reviewed and updated the patient's past medical history, allergies and medication list.               Physical Examination Review of Systems   /60 (BP Location: Right arm, Patient Position: Sitting, Cuff Size: Adult Regular)   Pulse 59   Resp 16   Ht 1.676 m (5' 6\")   Wt 53.3 kg (117 lb 6.4 oz)   LMP  (LMP Unknown)   BMI 18.95 kg/m      Body mass index is 18.95 kg/m .    Wt Readings from Last 3 Encounters:   08/16/23 53.3 kg (117 lb 6.4 oz)   08/07/23 53 kg (116 lb 12.8 oz)   07/17/23 53.2 kg (117 lb 4.6 oz)     General   Appearance:   Alert and oriented, in no acute distress.    HEENT:  Normocephalic and atraumatic. Conjunctiva and sclera are clear. Moist oral mucosa.    Neck: No JVP, carotid bruit or obvious thyromegaly.   Lungs:   Respirations unlabored. Clear bilaterally with no rales, rhonchi, or wheezes though somewhat diminished throughout   Cardiovascular:   Rhythm is regular. S1 and S2 are normal. No significant murmur is present. Lower extremities demonstrate mild nonpitting edema. Posterior tibial pulses are palpable though diminished bilaterally   Extremities: cyanosis to bilateral toes   Skin: Skin is dry and intact   Neurologic: Gait not asssessed. Mood and affect appropriate.                                                Medical History  Surgical History Family History Social History   Past Medical History:   Diagnosis Date    Allergic rhinitis     Hypertension     Tobacco abuse     Past Surgical History:   Procedure Laterality Date    OVARIAN CYST REMOVAL Right 1970    Family History   Problem Relation Age of Onset    Colon Cancer Mother 79 "            Lung Cancer Father 50            No Known Problems Sister     Other - See Comments Brother         6 brothers and 1 sister    Cancer Brother     Breast Cancer Maternal Aunt 75    Social History     Tobacco Use    Smoking status: Former     Packs/day: 0.50     Types: Cigarettes     Quit date: 2023     Years since quittin.2     Passive exposure: Never    Smokeless tobacco: Never   Vaping Use    Vaping Use: Never used   Substance Use Topics    Alcohol use: No     Comment: Alcoholic Drinks/day: rare    Drug use: No          Medications  Allergies   Current Outpatient Medications   Medication Sig Dispense Refill    albuterol (PROAIR HFA/PROVENTIL HFA/VENTOLIN HFA) 108 (90 Base) MCG/ACT inhaler Inhale 2 puffs into the lungs every 6 hours as needed for shortness of breath, wheezing or cough 18 g 11    apixaban ANTICOAGULANT (ELIQUIS ANTICOAGULANT) 5 MG tablet Take 1 tablet (5 mg) by mouth 2 times daily 180 tablet 3    atorvastatin (LIPITOR) 20 MG tablet Take 1 tablet (20 mg) by mouth daily 90 tablet 3    cholecalciferol, vitamin D3, (VITAMIN D3) 1,000 unit capsule [CHOLECALCIFEROL, VITAMIN D3, (VITAMIN D3) 1,000 UNIT CAPSULE] Take 1 capsule (1,000 Units total) by mouth daily.  0    furosemide (LASIX) 20 MG tablet Take 1 tablet (20 mg) by mouth daily 90 tablet 1    losartan (COZAAR) 50 MG tablet Take 1 tablet (50 mg) by mouth daily 90 tablet 4    metoprolol succinate ER (TOPROL XL) 50 MG 24 hr tablet Take 1 tablet (50 mg) by mouth daily      pantoprazole (PROTONIX) 40 MG EC tablet Take 1 tablet (40 mg) by mouth daily 90 tablet 0    amiodarone (PACERONE) 200 MG tablet Take 400mg twice daily for 10 days, then 200mg daily (Patient not taking: Reported on 2023) 100 tablet 3    Allergies   Allergen Reactions    Cat Hair Std Allergenic Ext [Cat Hair Extract] Unknown    Penicillins Shortness Of Breath     No prior use GA.       Lab Results    Chemistry/lipid CBC Cardiac Enzymes/BNP/TSH/INR    Lab Results   Component Value Date    BUN 20.8 2023     2023    CO2 29 2023     No results found for: CREATININE    Lab Results   Component Value Date    CHOL 171 10/04/2022    HDL 81 10/04/2022    LDL 77 10/04/2022      Lab Results   Component Value Date    WBC 8.5 2023    HGB 14.5 2023    HCT 45.3 2023    MCV 88 2023     2023    Lab Results   Component Value Date     (H) 2023    TSH 0.61 2023        40 minutes spent reviewing prior records (including documentation, laboratory studies, cardiac testing/imaging), history and physical exam, planning, and subsequent documentation.     This note has been dictated using voice recognition software. Any grammatical, typographical, or context distortions are unintentional and inherent to the software.    Carmen Plaza CNP  Clinical Cardiac Electrophysiology  [facility]  Clinic and [phone]  Fax: [phone]  Electrophysiology Nurses: [phone]                                       Thank you for allowing me to participate in the care of your patient.      Sincerely,     SARAH LE CNP     [facility]  cc:   No referring provider defined for this encounter.

## 2023-08-17 LAB
ATRIAL RATE - MUSE: 59 BPM
DIASTOLIC BLOOD PRESSURE - MUSE: NORMAL MMHG
INTERPRETATION ECG - MUSE: NORMAL
P AXIS - MUSE: 72 DEGREES
PR INTERVAL - MUSE: 160 MS
QRS DURATION - MUSE: 96 MS
QT - MUSE: 434 MS
QTC - MUSE: 429 MS
R AXIS - MUSE: 69 DEGREES
SYSTOLIC BLOOD PRESSURE - MUSE: NORMAL MMHG
T AXIS - MUSE: 103 DEGREES
VENTRICULAR RATE- MUSE: 59 BPM

## 2023-08-21 ENCOUNTER — ANESTHESIA EVENT (OUTPATIENT)
Dept: CARDIOLOGY | Facility: HOSPITAL | Age: 68
End: 2023-08-21
Payer: COMMERCIAL

## 2023-08-21 ENCOUNTER — HOSPITAL ENCOUNTER (OUTPATIENT)
Facility: HOSPITAL | Age: 68
Discharge: HOME | End: 2023-08-21
Attending: INTERNAL MEDICINE | Admitting: INTERNAL MEDICINE
Payer: COMMERCIAL

## 2023-08-21 ENCOUNTER — OFFICE VISIT (OUTPATIENT)
Dept: INTERPRETER SERVICES | Facility: CLINIC | Age: 68
End: 2023-08-21

## 2023-08-21 ENCOUNTER — ANESTHESIA (OUTPATIENT)
Dept: CARDIOLOGY | Facility: HOSPITAL | Age: 68
End: 2023-08-21
Payer: COMMERCIAL

## 2023-08-21 VITALS
OXYGEN SATURATION: 91 % | BODY MASS INDEX: 18.8 KG/M2 | WEIGHT: 117 LBS | TEMPERATURE: 97.9 F | SYSTOLIC BLOOD PRESSURE: 126 MMHG | RESPIRATION RATE: 22 BRPM | HEART RATE: 97 BPM | DIASTOLIC BLOOD PRESSURE: 65 MMHG | HEIGHT: 66 IN

## 2023-08-21 DIAGNOSIS — I48.91 ATRIAL FIBRILLATION WITH RVR (H): ICD-10-CM

## 2023-08-21 DIAGNOSIS — I48.19 PERSISTENT ATRIAL FIBRILLATION (H): Primary | ICD-10-CM

## 2023-08-21 LAB
ACT BLD: 293 SECONDS (ref 74–150)
ACT BLD: 513 SECONDS (ref 74–150)

## 2023-08-21 PROCEDURE — 250N000009 HC RX 250: Performed by: ANESTHESIOLOGY

## 2023-08-21 PROCEDURE — 250N000009 HC RX 250

## 2023-08-21 PROCEDURE — 258N000003 HC RX IP 258 OP 636: Performed by: NURSE ANESTHETIST, CERTIFIED REGISTERED

## 2023-08-21 PROCEDURE — 250N000011 HC RX IP 250 OP 636: Mod: JZ | Performed by: NURSE ANESTHETIST, CERTIFIED REGISTERED

## 2023-08-21 PROCEDURE — C1894 INTRO/SHEATH, NON-LASER: HCPCS | Performed by: INTERNAL MEDICINE

## 2023-08-21 PROCEDURE — 250N000011 HC RX IP 250 OP 636

## 2023-08-21 PROCEDURE — 370N000017 HC ANESTHESIA TECHNICAL FEE, PER MIN: Performed by: INTERNAL MEDICINE

## 2023-08-21 PROCEDURE — 250N000011 HC RX IP 250 OP 636: Mod: JZ

## 2023-08-21 PROCEDURE — 250N000009 HC RX 250: Performed by: INTERNAL MEDICINE

## 2023-08-21 PROCEDURE — 85347 COAGULATION TIME ACTIVATED: CPT | Mod: 91

## 2023-08-21 PROCEDURE — 250N000011 HC RX IP 250 OP 636: Mod: JZ | Performed by: INTERNAL MEDICINE

## 2023-08-21 PROCEDURE — 258N000003 HC RX IP 258 OP 636: Performed by: INTERNAL MEDICINE

## 2023-08-21 PROCEDURE — 93010 ELECTROCARDIOGRAM REPORT: CPT | Performed by: INTERNAL MEDICINE

## 2023-08-21 PROCEDURE — C1769 GUIDE WIRE: HCPCS | Performed by: INTERNAL MEDICINE

## 2023-08-21 PROCEDURE — 93656 COMPRE EP EVAL ABLTJ ATR FIB: CPT | Performed by: INTERNAL MEDICINE

## 2023-08-21 PROCEDURE — 93655 ICAR CATH ABLTJ DSCRT ARRHYT: CPT

## 2023-08-21 PROCEDURE — 710N000010 HC RECOVERY PHASE 1, LEVEL 2, PER MIN

## 2023-08-21 PROCEDURE — C1730 CATH, EP, 19 OR FEW ELECT: HCPCS | Performed by: INTERNAL MEDICINE

## 2023-08-21 PROCEDURE — C1733 CATH, EP, OTHR THAN COOL-TIP: HCPCS | Performed by: INTERNAL MEDICINE

## 2023-08-21 PROCEDURE — 93655 ICAR CATH ABLTJ DSCRT ARRHYT: CPT | Performed by: INTERNAL MEDICINE

## 2023-08-21 PROCEDURE — 93005 ELECTROCARDIOGRAM TRACING: CPT

## 2023-08-21 PROCEDURE — 250N000009 HC RX 250: Performed by: NURSE ANESTHETIST, CERTIFIED REGISTERED

## 2023-08-21 PROCEDURE — C1887 CATHETER, GUIDING: HCPCS | Performed by: INTERNAL MEDICINE

## 2023-08-21 PROCEDURE — 999N000157 HC STATISTIC RCP TIME EA 10 MIN

## 2023-08-21 PROCEDURE — 94640 AIRWAY INHALATION TREATMENT: CPT

## 2023-08-21 PROCEDURE — 999N000054 HC STATISTIC EKG NON-CHARGEABLE

## 2023-08-21 PROCEDURE — C1759 CATH, INTRA ECHOCARDIOGRAPHY: HCPCS | Performed by: INTERNAL MEDICINE

## 2023-08-21 PROCEDURE — C1732 CATH, EP, DIAG/ABL, 3D/VECT: HCPCS | Performed by: INTERNAL MEDICINE

## 2023-08-21 PROCEDURE — 272N000001 HC OR GENERAL SUPPLY STERILE: Performed by: INTERNAL MEDICINE

## 2023-08-21 PROCEDURE — C1766 INTRO/SHEATH,STRBLE,NON-PEEL: HCPCS | Performed by: INTERNAL MEDICINE

## 2023-08-21 RX ORDER — AMIODARONE HYDROCHLORIDE 200 MG/1
200 TABLET ORAL DAILY
Qty: 90 TABLET | Refills: 3 | Status: SHIPPED | OUTPATIENT
Start: 2023-08-21 | End: 2023-10-02

## 2023-08-21 RX ORDER — NALOXONE HYDROCHLORIDE 0.4 MG/ML
0.4 INJECTION, SOLUTION INTRAMUSCULAR; INTRAVENOUS; SUBCUTANEOUS
Status: DISCONTINUED | OUTPATIENT
Start: 2023-08-21 | End: 2023-08-21 | Stop reason: HOSPADM

## 2023-08-21 RX ORDER — METOPROLOL SUCCINATE 50 MG/1
50 TABLET, EXTENDED RELEASE ORAL DAILY
Qty: 90 TABLET | Refills: 3 | Status: SHIPPED | OUTPATIENT
Start: 2023-08-21 | End: 2024-03-18

## 2023-08-21 RX ORDER — ONDANSETRON 2 MG/ML
4 INJECTION INTRAMUSCULAR; INTRAVENOUS EVERY 6 HOURS PRN
Status: DISCONTINUED | OUTPATIENT
Start: 2023-08-21 | End: 2023-08-21 | Stop reason: HOSPADM

## 2023-08-21 RX ORDER — DEXAMETHASONE SODIUM PHOSPHATE 10 MG/ML
INJECTION, SOLUTION INTRAMUSCULAR; INTRAVENOUS PRN
Status: DISCONTINUED | OUTPATIENT
Start: 2023-08-21 | End: 2023-08-21

## 2023-08-21 RX ORDER — ONDANSETRON 4 MG/1
4 TABLET, ORALLY DISINTEGRATING ORAL EVERY 30 MIN PRN
Status: DISCONTINUED | OUTPATIENT
Start: 2023-08-21 | End: 2023-08-21 | Stop reason: HOSPADM

## 2023-08-21 RX ORDER — SODIUM CHLORIDE 9 MG/ML
100 INJECTION, SOLUTION INTRAVENOUS CONTINUOUS
Status: DISCONTINUED | OUTPATIENT
Start: 2023-08-21 | End: 2023-08-21 | Stop reason: HOSPADM

## 2023-08-21 RX ORDER — HEPARIN SODIUM 1000 [USP'U]/ML
INJECTION, SOLUTION INTRAVENOUS; SUBCUTANEOUS
Status: DISCONTINUED | OUTPATIENT
Start: 2023-08-21 | End: 2023-08-21 | Stop reason: HOSPADM

## 2023-08-21 RX ORDER — FENTANYL CITRATE 50 UG/ML
INJECTION, SOLUTION INTRAMUSCULAR; INTRAVENOUS PRN
Status: DISCONTINUED | OUTPATIENT
Start: 2023-08-21 | End: 2023-08-21

## 2023-08-21 RX ORDER — IBUPROFEN 600 MG/1
600 TABLET, FILM COATED ORAL EVERY 6 HOURS PRN
Status: DISCONTINUED | OUTPATIENT
Start: 2023-08-21 | End: 2023-08-21 | Stop reason: HOSPADM

## 2023-08-21 RX ORDER — ADENOSINE 3 MG/ML
INJECTION, SOLUTION INTRAVENOUS
Status: DISCONTINUED | OUTPATIENT
Start: 2023-08-21 | End: 2023-08-21 | Stop reason: HOSPADM

## 2023-08-21 RX ORDER — FENTANYL CITRATE 50 UG/ML
50 INJECTION, SOLUTION INTRAMUSCULAR; INTRAVENOUS EVERY 5 MIN PRN
Status: DISCONTINUED | OUTPATIENT
Start: 2023-08-21 | End: 2023-08-21 | Stop reason: HOSPADM

## 2023-08-21 RX ORDER — ONDANSETRON 2 MG/ML
INJECTION INTRAMUSCULAR; INTRAVENOUS PRN
Status: DISCONTINUED | OUTPATIENT
Start: 2023-08-21 | End: 2023-08-21

## 2023-08-21 RX ORDER — ACETAMINOPHEN 325 MG/1
650 TABLET ORAL EVERY 4 HOURS PRN
Status: DISCONTINUED | OUTPATIENT
Start: 2023-08-21 | End: 2023-08-21 | Stop reason: HOSPADM

## 2023-08-21 RX ORDER — LIDOCAINE 40 MG/G
CREAM TOPICAL
Status: DISCONTINUED | OUTPATIENT
Start: 2023-08-21 | End: 2023-08-21 | Stop reason: HOSPADM

## 2023-08-21 RX ORDER — ONDANSETRON 2 MG/ML
4 INJECTION INTRAMUSCULAR; INTRAVENOUS EVERY 30 MIN PRN
Status: DISCONTINUED | OUTPATIENT
Start: 2023-08-21 | End: 2023-08-21 | Stop reason: HOSPADM

## 2023-08-21 RX ORDER — NALOXONE HYDROCHLORIDE 0.4 MG/ML
0.2 INJECTION, SOLUTION INTRAMUSCULAR; INTRAVENOUS; SUBCUTANEOUS
Status: DISCONTINUED | OUTPATIENT
Start: 2023-08-21 | End: 2023-08-21 | Stop reason: HOSPADM

## 2023-08-21 RX ORDER — PROTAMINE SULFATE 10 MG/ML
INJECTION, SOLUTION INTRAVENOUS PRN
Status: DISCONTINUED | OUTPATIENT
Start: 2023-08-21 | End: 2023-08-21

## 2023-08-21 RX ORDER — IPRATROPIUM BROMIDE AND ALBUTEROL SULFATE 2.5; .5 MG/3ML; MG/3ML
3 SOLUTION RESPIRATORY (INHALATION) ONCE
Status: COMPLETED | OUTPATIENT
Start: 2023-08-21 | End: 2023-08-21

## 2023-08-21 RX ORDER — PROPOFOL 10 MG/ML
INJECTION, EMULSION INTRAVENOUS PRN
Status: DISCONTINUED | OUTPATIENT
Start: 2023-08-21 | End: 2023-08-21

## 2023-08-21 RX ORDER — ETOMIDATE 2 MG/ML
INJECTION INTRAVENOUS PRN
Status: DISCONTINUED | OUTPATIENT
Start: 2023-08-21 | End: 2023-08-21

## 2023-08-21 RX ORDER — ONDANSETRON 4 MG/1
4 TABLET, ORALLY DISINTEGRATING ORAL EVERY 6 HOURS PRN
Status: DISCONTINUED | OUTPATIENT
Start: 2023-08-21 | End: 2023-08-21 | Stop reason: HOSPADM

## 2023-08-21 RX ORDER — FENTANYL CITRATE 50 UG/ML
25 INJECTION, SOLUTION INTRAMUSCULAR; INTRAVENOUS EVERY 5 MIN PRN
Status: DISCONTINUED | OUTPATIENT
Start: 2023-08-21 | End: 2023-08-21 | Stop reason: HOSPADM

## 2023-08-21 RX ORDER — HYDROMORPHONE HYDROCHLORIDE 1 MG/ML
0.2 INJECTION, SOLUTION INTRAMUSCULAR; INTRAVENOUS; SUBCUTANEOUS EVERY 5 MIN PRN
Status: DISCONTINUED | OUTPATIENT
Start: 2023-08-21 | End: 2023-08-21 | Stop reason: HOSPADM

## 2023-08-21 RX ORDER — GLYCOPYRROLATE 0.2 MG/ML
INJECTION, SOLUTION INTRAMUSCULAR; INTRAVENOUS PRN
Status: DISCONTINUED | OUTPATIENT
Start: 2023-08-21 | End: 2023-08-21

## 2023-08-21 RX ORDER — BUPIVACAINE HYDROCHLORIDE 2.5 MG/ML
INJECTION, SOLUTION EPIDURAL; INFILTRATION; INTRACAUDAL
Status: DISCONTINUED | OUTPATIENT
Start: 2023-08-21 | End: 2023-08-21 | Stop reason: HOSPADM

## 2023-08-21 RX ORDER — HYDROMORPHONE HYDROCHLORIDE 1 MG/ML
0.4 INJECTION, SOLUTION INTRAMUSCULAR; INTRAVENOUS; SUBCUTANEOUS EVERY 5 MIN PRN
Status: DISCONTINUED | OUTPATIENT
Start: 2023-08-21 | End: 2023-08-21 | Stop reason: HOSPADM

## 2023-08-21 RX ORDER — HEPARIN SODIUM 10000 [USP'U]/100ML
INJECTION, SOLUTION INTRAVENOUS CONTINUOUS PRN
Status: DISCONTINUED | OUTPATIENT
Start: 2023-08-21 | End: 2023-08-21 | Stop reason: HOSPADM

## 2023-08-21 RX ORDER — SODIUM CHLORIDE, SODIUM LACTATE, POTASSIUM CHLORIDE, CALCIUM CHLORIDE 600; 310; 30; 20 MG/100ML; MG/100ML; MG/100ML; MG/100ML
INJECTION, SOLUTION INTRAVENOUS CONTINUOUS
Status: DISCONTINUED | OUTPATIENT
Start: 2023-08-21 | End: 2023-08-21 | Stop reason: HOSPADM

## 2023-08-21 RX ADMIN — DEXAMETHASONE SODIUM PHOSPHATE 10 MG: 10 INJECTION, SOLUTION INTRAMUSCULAR; INTRAVENOUS at 14:20

## 2023-08-21 RX ADMIN — SUGAMMADEX 200 MG: 100 INJECTION, SOLUTION INTRAVENOUS at 15:49

## 2023-08-21 RX ADMIN — ROCURONIUM BROMIDE 20 MG: 50 INJECTION, SOLUTION INTRAVENOUS at 15:06

## 2023-08-21 RX ADMIN — PHENYLEPHRINE HYDROCHLORIDE 0.5 MCG/KG/MIN: 10 INJECTION INTRAVENOUS at 14:31

## 2023-08-21 RX ADMIN — PHENYLEPHRINE HYDROCHLORIDE 100 MCG: 10 INJECTION INTRAVENOUS at 15:28

## 2023-08-21 RX ADMIN — Medication 60 MG: at 14:19

## 2023-08-21 RX ADMIN — IPRATROPIUM BROMIDE AND ALBUTEROL SULFATE 3 ML: .5; 3 SOLUTION RESPIRATORY (INHALATION) at 12:02

## 2023-08-21 RX ADMIN — SODIUM CHLORIDE 100 ML/HR: 9 INJECTION, SOLUTION INTRAVENOUS at 13:42

## 2023-08-21 RX ADMIN — PROTAMINE SULFATE 50 MG: 10 INJECTION, SOLUTION INTRAVENOUS at 15:48

## 2023-08-21 RX ADMIN — ETOMIDATE 10 MG: 2 INJECTION, SOLUTION INTRAVENOUS at 14:18

## 2023-08-21 RX ADMIN — SODIUM CHLORIDE: 9 INJECTION, SOLUTION INTRAVENOUS at 15:13

## 2023-08-21 RX ADMIN — PHENYLEPHRINE HYDROCHLORIDE 100 MCG: 10 INJECTION INTRAVENOUS at 14:31

## 2023-08-21 RX ADMIN — PROPOFOL 30 MG: 10 INJECTION, EMULSION INTRAVENOUS at 14:26

## 2023-08-21 RX ADMIN — FENTANYL CITRATE 100 MCG: 50 INJECTION, SOLUTION INTRAMUSCULAR; INTRAVENOUS at 14:18

## 2023-08-21 RX ADMIN — ONDANSETRON 4 MG: 2 INJECTION INTRAMUSCULAR; INTRAVENOUS at 15:47

## 2023-08-21 RX ADMIN — GLYCOPYRROLATE 0.2 MG: 0.2 INJECTION INTRAMUSCULAR; INTRAVENOUS at 14:31

## 2023-08-21 ASSESSMENT — ENCOUNTER SYMPTOMS: DYSRHYTHMIAS: 1

## 2023-08-21 ASSESSMENT — ACTIVITIES OF DAILY LIVING (ADL)
ADLS_ACUITY_SCORE: 35

## 2023-08-21 ASSESSMENT — LIFESTYLE VARIABLES: TOBACCO_USE: 1

## 2023-08-21 ASSESSMENT — COPD QUESTIONNAIRES: COPD: 1

## 2023-08-21 NOTE — ANESTHESIA CARE TRANSFER NOTE
Patient: Verena Lagunas    Procedure: Procedure(s):  Ablation Atrial Fibrillation       Diagnosis: Persistent atrial fibrillation  Diagnosis Additional Information: No value filed.    Anesthesia Type:   General     Note:    Oropharynx: oropharynx clear of all foreign objects and spontaneously breathing  Level of Consciousness: awake  Oxygen Supplementation: room air    Independent Airway: airway patency satisfactory and stable  Dentition: dentition unchanged  Vital Signs Stable: post-procedure vital signs reviewed and stable  Report to RN Given: handoff report given  Patient transferred to: Cardiac Special Care          Vitals:  Vitals Value Taken Time   /71 08/21/23 1610   Temp 98.5 F 08/21/23 1610   Pulse 92 08/21/23 1610   Resp 14 08/21/23 1610   SpO2 100 08/21/23 1610       Electronically Signed By: SARAH Charles CRNA  August 21, 2023  4:10 PM

## 2023-08-21 NOTE — DISCHARGE INSTRUCTIONS
[facility]  Cardiac Electrophysiology  [address]  [place] [address]   Office: [phone]  Fax: [phone]       Cardiac Electrophysiology - Post Ablation Discharge Instructions      PROCEDURE   Atrial fibrillation ablation         MEDICATION INSTRUCTIONS   Continue taking your prior to procedure medications, including amiodarone 200mg daily for now - we will assess for stopping amiodarone at your 6 week follow-up visit.  Continue taking your blood thinner apixaban (Eliquis).          DISCHARGE INSTRUCTIONS   Medications   Take your medications as prescribed.   It is important for you to continue your blood thinner without interruption, unless your electrophysiologist instructs you otherwise.     General instructions   Have an adult stay with you until tomorrow.   You may resume your normal diet.     You may shower tomorrow.  Do not take a bath, or use a hot tub or pool for at least 1 week.    Activity recommendations   Do not drive for 3 days.   Avoid stooping or squatting more than 90 degrees at the hips for 7 days.   Avoid repetitive motions such as loading , vacuuming, raking or shoveling for 7 days.   Avoid heavy lifting (greater than 25lbs) for 7 days.       Groin care instructions   For the first 24 hours after your ablation, check the groin access sites every 1-2 hours while awake.   You may keep a bandaid over the puncture sites for 1 or 2 days post-procedure and thereafter may keep these sites uncovered.  Change the bandaid daily.  If there is minor oozing, apply another bandaid and remove it after 12 hours.   For 2 days, when you cough, sneeze, laugh or move your bowels, hold your hand over the puncture sites and press firmly.   Do not scrub the groin access sites.   Do not use lotion or powder near the groin access sites.       Arrhythmias following ablation  Recurrent atrial fibrillation and palpitations are common within the first 3 months post  ablation while your heart recovers from the procedure.  These are usually more frequent in the first few weeks following ablation and should occur less frequently over time.  Please contact us if you are having frequent or long lasting atrial fibrillation episodes following ablation.    Common findings after ablation  Bruising and a dime or pea size lump at the access sites is common.  If you notice increased swelling, external bleeding, or have other concerns regarding your groin access sites please call your electrophysiology team's office, and if after hours consider emergency department evaluation.   Soreness and mild pain at your groin sites is normal, to help relieve this pain you can apply ice/cold packs to the sites for 20min 3-4 times per day.   Pleuritic chest discomfort (chest pain worse with taking deep breaths, worse with laying flat on your back) can occur after ablation, usually coming about within the first 24-72hrs post ablation.  If this occurs and is severe enough to be troublesome to you, please call us and consider starting a course of ibuprofen 400mg three times daily for 5 to 7 days.     Things to watch for   As with any type of procedure, please be more attentive to unusual symptoms post ablation (eg. fever, neurologic changes, pain with swallowing, loss of consciousness, etc) - we recommend ER evaluation for any such symptoms in the first few weeks post procedure.       Contact the EP Nurse line with any of the following.  Contact the cardiology on-call number after business hours.    Consider ER evaluation for severe symptoms.   Groin pain, swelling, or growing hard lump around the puncture sites.   Groin redness, tenderness, swelling, or drainage (blood or pus).   Neurological changes (for example: leg, arm or face weakness or numbness, difficulties with speech or word finding, problems walking or with your balance, vision changes).   Any numbness, coolness or changes in color in your  extremities.  Sudden onset severe abdominal or back pain.  Moderate or severe chest pain not relieved by Tylenol or Ibuprofen, particularly after the first 48 hours.   Shortness of breath.   Chills or fever greater than 100 F.   Difficulty swallowing food or liquids.  Coughing up blood.   Blood in your stool.  Nausea and vomiting.  Difficulty urinating.  Recurrent atrial fibrillation associated with prolonged rapid heart rates (for example, heart rates over 140bpm for greater than 4 hours) or associated with additional concerning symptoms (for example, chest pain, lightheadedness, loss of consciousness, sweating).     If you are being evaluated at an emergency department, please tell your ER doctor that you have recently underwent an atrial fibrillation ablation and ask the ER to contact our office.  Many special considerations apply following ablation - these may be overlooked during an ER evaluation.      Our office will have a follow-up visit scheduled for you in approximately 6 weeks.  Please do not hesitate to call us before that time should issues arise.         [facility]      To reach the EP nurses working with Dr. Briscoe:   [phone]        To reach the general Heart Care Clinic line or after hours service:   [phone]   If you are calling after hours, please listen to the entire voicemail,    a live  will answer at the end of the message.

## 2023-08-21 NOTE — ANESTHESIA PROCEDURE NOTES
Airway       Patient location during procedure: OR       Procedure Start/Stop Times: 8/21/2023 2:21 PM and 8/21/2023 2:23 AM  Staff -        CRNA: Maxx Funez APRN CRNA       Performed By: CRNA  Consent for Airway        Urgency: elective  Indications and Patient Condition       Indications for airway management: renae-procedural       Induction type:intravenous       Mask difficulty assessment: 2 - vent by mask + OA or adjuvant +/- NMBA    Final Airway Details       Final airway type: endotracheal airway       Successful airway: ETT - single  Endotracheal Airway Details        ETT size (mm): 7.0       Cuffed: yes       Successful intubation technique: video laryngoscopy       VL Blade Size: Glidescope 3       Grade View of Cords: 1       Adjucts: stylet       Position: Right       Measured from: lips       Secured at (cm): 22       Bite block used: None    Post intubation assessment        Placement verified by: capnometry, equal breath sounds and chest rise        Number of attempts at approach: 1       Number of other approaches attempted: 0       Secured with: silk tape       Ease of procedure: easy       Dentition: Intact and Unchanged    Medication(s) Administered   Medication Administration Time: 8/21/2023 2:21 PM

## 2023-08-21 NOTE — ANESTHESIA POSTPROCEDURE EVALUATION
Patient: Verena Lagunas    Procedure: Procedure(s):  Ablation Atrial Fibrillation       Anesthesia Type:  General    Note:  Disposition: Outpatient   Postop Pain Control: Uneventful            Sign Out: Well controlled pain   PONV: No   Neuro/Psych: Uneventful            Sign Out: Acceptable/Baseline neuro status   Airway/Respiratory: Uneventful            Sign Out: Acceptable/Baseline resp. status   CV/Hemodynamics: Uneventful            Sign Out: Acceptable CV status; No obvious hypovolemia; No obvious fluid overload   Other NRE: NONE   DID A NON-ROUTINE EVENT OCCUR? No       Last vitals:  Vitals Value Taken Time   /66 08/21/23 1715   Temp     Pulse 92 08/21/23 1717   Resp 28 08/21/23 1717   SpO2 95 % 08/21/23 1717   Vitals shown include unvalidated device data.    Electronically Signed By: Tomasz Lucero MD  August 21, 2023  5:18 PM

## 2023-08-21 NOTE — ANESTHESIA PREPROCEDURE EVALUATION
Anesthesia Pre-Procedure Evaluation    Patient: Verena Lagunas   MRN: [id number] : 1955        Procedure : Procedure(s):  Ablation Atrial Fibrillation          Past Medical History:   Diagnosis Date     Allergic rhinitis      Hypertension      Tobacco abuse       Past Surgical History:   Procedure Laterality Date     OVARIAN CYST REMOVAL Right 1970      Allergies   Allergen Reactions     Cat Hair Std Allergenic Ext [Cat Hair Extract] Unknown     Penicillins Shortness Of Breath      Social History     Tobacco Use     Smoking status: Former     Packs/day: 0.50     Types: Cigarettes     Quit date: 2023     Years since quittin.2     Passive exposure: Never     Smokeless tobacco: Never   Substance Use Topics     Alcohol use: No     Comment: Alcoholic Drinks/day: rare      Wt Readings from Last 1 Encounters:   23 53.1 kg (117 lb)        Anesthesia Evaluation   Pt has had prior anesthetic.     No history of anesthetic complications       ROS/MED HX  ENT/Pulmonary: Comment: Pt feels it's difficult to breath deeply, left her albuterol inhaler at home.  Pt says she just recently quit smoking    (+)                tobacco use, Past use,        COPD (chronic bronchitis),              Neurologic: Comment: Deafness      Cardiovascular: Comment:    TTE: 2023  The left ventricle is mildly dilated.  Left ventricular function is decreased. The ejection fraction is 20-25%  (severely reduced).  Left ventricular diastolic function is abnormal.  There is severe global hypokinesia of the left ventricle.  The right ventricle is mildly dilated. Moderately decreased right ventricular  systolic function  The left atrium is severely dilated. The right atrium is moderately dilated.  Mild mitral valve prolapse, bileaflet. There is mild to moderate (1-2+) mitral  regurgitation.  Right ventricular systolic pressure is elevated, consistent with mild  pulmonary hypertension.         (+) Dyslipidemia hypertension- -   -  -  -      CHF etiology: tachycardia Last EF: 20-25%                dysrhythmias, a-fib,             METS/Exercise Tolerance: >4 METS    Hematologic:  - neg hematologic  ROS     Musculoskeletal:       GI/Hepatic:     (+) GERD,                   Renal/Genitourinary:  - neg Renal ROS     Endo:  - neg endo ROS     Psychiatric/Substance Use:       Infectious Disease:       Malignancy:       Other:            Physical Exam    Airway        Mallampati: II   TM distance: > 3 FB   Neck ROM: full   Mouth opening: > 3 cm    Respiratory Devices and Support         Dental         B=Bridge, C=Chipped, L=Loose, M=Missing    Cardiovascular          Rhythm and rate: regular and normal     Pulmonary       Comment: Prolonged expiratory        OUTSIDE LABS:  CBC:   Lab Results   Component Value Date    WBC 8.1 08/16/2023    WBC 8.5 08/07/2023    HGB 13.3 08/16/2023    HGB 14.5 08/07/2023    HCT 42.5 08/16/2023    HCT 45.3 08/07/2023     08/16/2023     08/07/2023     BMP:   Lab Results   Component Value Date     08/16/2023     08/07/2023    POTASSIUM 4.4 08/16/2023    POTASSIUM 4.7 08/07/2023    CHLORIDE 100 08/16/2023    CHLORIDE 98 08/07/2023    CO2 30 (H) 08/16/2023    CO2 29 08/07/2023    BUN 15.9 08/16/2023    BUN 20.8 08/07/2023    CR 1.05 (H) 08/16/2023    CR 0.95 08/07/2023    GLC 82 08/16/2023    GLC 76 08/07/2023     COAGS: No results found for: PTT, INR, FIBR  POC: No results found for: BGM, HCG, HCGS  HEPATIC:   Lab Results   Component Value Date    ALBUMIN 3.6 09/27/2021    PROTTOTAL 6.6 09/27/2021    ALT 21 09/27/2021    AST 27 09/27/2021    ALKPHOS 58 09/27/2021    BILITOTAL 0.4 09/27/2021     OTHER:   Lab Results   Component Value Date    A1C 5.6 12/22/2016    MYRANDA 9.4 08/16/2023    TSH 0.61 06/01/2023       Anesthesia Plan    ASA Status:  3    NPO Status:  NPO Appropriate    Anesthesia Type: General.     - Airway: ETT   Induction: Intravenous, Propofol.   Maintenance: Balanced.   Techniques and  Equipment:     - Airway: Video-Laryngoscope     - Lines/Monitors: 2nd IV     Consents    Anesthesia Plan(s) and associated risks, benefits, and realistic alternatives discussed. Questions answered and patient/representative(s) expressed understanding.     - Discussed: Risks, Benefits and Alternatives for BOTH SEDATION and the PROCEDURE were discussed     - Discussed with:  Patient      - Extended Intubation/Ventilatory Support Discussed: No.      - Patient is DNR/DNI Status: No     Use of blood products discussed: Yes.     - Discussed with: Patient.     - Consented: consented to blood products            Reason for refusal: other.     Postoperative Care    Pain management: Multi-modal analgesia.   PONV prophylaxis: Ondansetron (or other 5HT-3), Dexamethasone or Solumedrol     Comments:    Other Comments: Preop duoneb.      Invasive lines per cardiology              Zenon Campbell MD

## 2023-08-21 NOTE — INTERVAL H&P NOTE
I have reviewed the surgical (or preoperative) H&P that is linked to this encounter, and examined the patient. There are no significant changes  Patient confirms no missed doses of Eliquis in the last 21 days    Clinical Conditions Present on Arrival:  Clinically Significant Risk Factors Present on Admission                # Drug Induced Coagulation Defect: home medication list includes an anticoagulant medication

## 2023-08-21 NOTE — PROGRESS NOTES
RESPIRATORY CARE NOTE   Patient is on room air, BS clear, gave Duoneb treatment x1, BS post treatment clear, increased aeration, patient perceives moderate improvement, patient tolerated well.     Mynor Bolivar, RT

## 2023-08-21 NOTE — INTERVAL H&P NOTE
I have reviewed the surgical (or preoperative) H&P that is linked to this encounter, and examined the patient. There are no significant changes  Patient confirms no missed doses in the last 21 days  Clinical Conditions Present on Arrival:  Clinically Significant Risk Factors Present on Admission                # Drug Induced Coagulation Defect: home medication list includes an anticoagulant medication

## 2023-08-21 NOTE — Clinical Note
Arrhythmia Type: atrial fibrillation.   Method of Cardioversion: synchronous.   The arrhythmia was terminated.   Energy shock delivered: 200 joules.   Post cardioversion rhythm: sinus rhythm.

## 2023-08-21 NOTE — Clinical Note
[employer] system 12 lead EKG, hemodynamics 5 lead, pulse oximetery, NIBP, Physiocontrol hands off defibrillator/external pacer, with 3 monitoring leads to patient. Baseline assessment done.

## 2023-08-22 LAB
ATRIAL RATE - MUSE: 93 BPM
DIASTOLIC BLOOD PRESSURE - MUSE: NORMAL MMHG
INTERPRETATION ECG - MUSE: NORMAL
P AXIS - MUSE: 72 DEGREES
PR INTERVAL - MUSE: 186 MS
QRS DURATION - MUSE: 104 MS
QT - MUSE: 400 MS
QTC - MUSE: 497 MS
R AXIS - MUSE: 74 DEGREES
SYSTOLIC BLOOD PRESSURE - MUSE: NORMAL MMHG
T AXIS - MUSE: 90 DEGREES
VENTRICULAR RATE- MUSE: 93 BPM

## 2023-08-22 NOTE — PROGRESS NOTES
Pt reports that she needs staff to call her at [phone] for a check up visit post procedure. This is her video phone # she request that calls have a  ready as she struggles to communicate through typing. Will forward this information to HEART care clinic. If she does not answer please leave a direct number for her to call back as she is deaf and has difficulty following prompts.

## 2023-08-24 ENCOUNTER — VIRTUAL VISIT (OUTPATIENT)
Dept: CARDIOLOGY | Facility: CLINIC | Age: 68
End: 2023-08-24
Payer: COMMERCIAL

## 2023-08-24 DIAGNOSIS — Z86.79 STATUS POST ABLATION OF ATRIAL FIBRILLATION: Primary | ICD-10-CM

## 2023-08-24 DIAGNOSIS — Z98.890 STATUS POST ABLATION OF ATRIAL FIBRILLATION: Primary | ICD-10-CM

## 2023-08-24 PROCEDURE — 99207 PR NO CHARGE NURSE ONLY: CPT

## 2023-08-24 NOTE — PATIENT INSTRUCTIONS
Your anticoagulation medication Eliquis:  It is important to remain on your anticoagulation medication uninterrupted after your ablation to reduce your risk of a stroke or heart attack, do not stop this medication  Please contact me if you have any questions regarding your anticoagulation medication    Healing from your pulmonary vein ablation:  Stay well hydrated, and increase your fluid intake during this recovery period  High protein foods aide in your bodies healing process  No aggressive or aerobic activity for 7-10 days, and do not lift more than 25 pounds for 7 days   Increase your activity gradually over the next 5-10 days, working back to your normal daily activity  If you are experiencing pain at your groin sites from the procedure, we advise applying ice for 20 minute durations 3-4 times per day     Please call me if any of the following occur:  Episodes of Atrial Fibrillation lasting greater than 4 hours, or if you notice the episodes are increasing in frequency or duration  If you develop shortness of breath, dizziness, or unresolving chest pains   Changes at your groin sites including swelling, hardening, drainage, increase in bruising, or an increase in pain  If you develop a temperature greater than 100.5 degrees (especially weeks 2-5 post   Procedure)    Call 911 if you are having symptoms of a stroke; difficulty with your speech, problems walking, difficulty with balance, vision disturbances, facial drooping or numbness, and muscle weakness on one side of your body     Your follow up appointments are as follows:  You will be seen by the electrophysiologist nurse practitioner at 6 weeks after your ablation  At your 6 week appointment, it will be determined if a 3 month follow-up is needed    Sincerely,  Elvia Arriaza RN [phone]    After hours please contact the on call service at # [phone]

## 2023-08-24 NOTE — PROGRESS NOTES
Post PVI Procedural Follow Up Call    Pt is s/p PVI from 8/21/23 with Dr Briscoe  PC was placed to pt,  left VM using .  8/24/2023 3:26 PM  Tamia Leone RN

## 2023-08-24 NOTE — PROGRESS NOTES
Post PVI Procedural Follow Up Call    Pt is s/p PVI from 8/21/23 with Dr Briscoe  PC was placed to pt, spoke to pt    General Assessment:     Weight: Pt reports weight is slightly up from baseline compared to pre procedural weight, but confirms weight is coming down since discharge and denies s/s of abnormal fluid retention s/p procedure    Pain: Pt denies generalized or localized pain abnormal to healing s/p     /GI: Pt denies difficulty swallowing, denies constipation, denies urinary retention/difficulty, reports no s/s of infection, report normal appetite, and reports staying hydrated.    Respiratory: Pt denies SOB, denies difficulty breathing, denies throat pain, denies changes/abnormal sputum, and denies any further symptoms abnormal to normal healing process s/p PVI.    Activity: Pt is tolerating advancement in activity while following physical restrictions, staying well hydrated, and gradually working into baseline activity.     Rhythm Assessment:   Pt denies palpitations, denies irregularities in HR or rhythm, and denies symptoms or sustained AF episodes.    Procedure Site Assessment:   Pts some bruising around sites without significant change from hospital discharge and normal to PVI recovery    Anticoagulation/Medication:  Pt remain on Eliquis without interruption  Per guidelines by Dr Briscoe no ASA needed upon discharge    Education completed with pt at this visit:  Reviewed normal post-op PVI healing process, when to contact EP-RN/EP-MD, contact information was given to the pt for further concerns or questions, and pt verbalized understanding    Follow up  Pts AVS was printed and mailed to pt by scheduling team, pt will be seen by EP NP in 4-6 wks, monitor will be ordered at this follow-up if indicated, and 3mo follow-up and monitor will be determined at 6wk follow-up by EP NP    8/24/2023 3:41 PM  Elvia Arriaza RN

## 2023-08-29 ENCOUNTER — OFFICE VISIT (OUTPATIENT)
Dept: CARDIOLOGY | Facility: CLINIC | Age: 68
End: 2023-08-29
Attending: INTERNAL MEDICINE
Payer: COMMERCIAL

## 2023-08-29 VITALS
OXYGEN SATURATION: 99 % | SYSTOLIC BLOOD PRESSURE: 117 MMHG | DIASTOLIC BLOOD PRESSURE: 72 MMHG | WEIGHT: 119 LBS | BODY MASS INDEX: 19.21 KG/M2 | HEART RATE: 84 BPM

## 2023-08-29 DIAGNOSIS — I50.21 ACUTE SYSTOLIC CONGESTIVE HEART FAILURE (H): ICD-10-CM

## 2023-08-29 DIAGNOSIS — I48.91 ATRIAL FIBRILLATION WITH RVR (H): ICD-10-CM

## 2023-08-29 DIAGNOSIS — I42.8 OTHER CARDIOMYOPATHY (H): Primary | ICD-10-CM

## 2023-08-29 DIAGNOSIS — I10 HYPERTENSION, UNSPECIFIED TYPE: ICD-10-CM

## 2023-08-29 PROCEDURE — 99214 OFFICE O/P EST MOD 30 MIN: CPT | Performed by: INTERNAL MEDICINE

## 2023-08-29 PROCEDURE — 93000 ELECTROCARDIOGRAM COMPLETE: CPT | Performed by: INTERNAL MEDICINE

## 2023-08-29 NOTE — PROGRESS NOTES
[facility] [address], [place] [address]   [email or web address]   OFFICE: [phone]            Impression and Plan     1.  Atrial fibrillation/atrial flutter.  Verena history of atrial fibrillation and underwent concomitant pulmonary vein isolation procedure for atrial fibrillation and CTI ablation for induced CTI-dependent atrial flutter on 21 August 2023.  Twelve-lead ECG in the office today reveals sinus rhythm.  Plan:  Continue amiodarone 200 mg daily for now (can address whether to continue at time of follow-up visit in the Atrial Fibrillation Clinic on 2 October 2023).  Continue apixaban 5 mg twice daily.    2.  Cardiomyopathy.  Echocardiogram 7 June 2023 revealed ejection fraction of 20-25%.  This is felt likely tachycardia mediated from prolonged atrial arrhythmias with rapid ventricular response.  On exam, Verena appears volume neutral.  Normally would consider further augmentation and standard therapy for cardiomyopathy though patient has had some intermittent lightheadedness and systolic blood pressure currently in the 110s  Consequently, for now we will hold off.  I do feel be prudent to exclude ischemic heart disease.  Continue metoprolol succinate 50 mg daily.  Continue losartan 50 mg daily.  Continue furosemide 20 mg daily.  CT coronary angiogram to exclude significant obstructive coronary disease..  Will arrange for follow-up assessment of left ventricular function by echocardiogram in approximately 6-8 weeks (considered cardiac MRI though patient states that she is very claustrophobic).    3.  Hypertension.  Blood pressure is reasonable in the office today at 117/72.  As aforementioned, if anything she has had some tendency toward lightheadedness and lower blood pressure at times on the current therapy.    Verena is to follow-up in the Atrial Fibrillation Clinic on 2 October 2023.  Follow-up and further recommendations with myself pending CT  coronary angiogram findings.      35 minutes spent reviewing prior records (including documentation, laboratory studies, cardiac testing/imaging), interview with patient along with physical exam, planning, and subsequent documentation/crafting of note).           History of Present Illness    Once again I would like to thank you again for asking me to participate in the care of your patient, Verena Lagunas.  As you know, but to reiterate for my own records, Verena Lagunas is a 68 year old female with history of atrial fibrillation and underwent concomitant pulmonary vein isolation procedure for atrial fibrillation and CTI ablation for induced CTI-dependent atrial flutter on 21 August 2023.  She was noted to have progressive dyspnea starting April 2023.  She was subsequently found to have atrial fibrillation with rapid ventricular response in May 2023.  Echocardiogram 7 June 2023 revealed severe depression and left ventricular systolic performance with ejection fraction of 20-25%.    Further review of systems is otherwise negative/noncontributory (medical record and 13 point review of systems reviewed as well and pertinent positives noted).         Cardiac Diagnostics      Echocardiogram 7 June 2023:  Mild left ventricular enlargement with severe depression left ventricular systolic performance ejection fraction 20-25%.  There is severe global reduction left ventricular systolic performance.  Mild-moderate mitral insufficiency.  Mild right ventricular enlargement with moderately reduced right ventricular systolic performance.  Severe left atrial enlargement.  Moderate right atrial enlargement.    Twelve-lead ECG (personally reviewed) 21 August 2023: Sinus rhythm.  ST depression with T wave inversion in anterolateral leads.           Physical Examination       /72 (BP Location: Left arm, Patient Position: Sitting, Cuff Size: Adult Regular)   Pulse 84   Wt 54 kg (119 lb)   LMP  (LMP Unknown)   SpO2 99%    BMI 19.21 kg/m          Wt Readings from Last 3 Encounters:   23 54 kg (119 lb)   23 53.1 kg (117 lb)   23 53.3 kg (117 lb 6.4 oz)     The patient is alert and oriented times three. Sclerae are anicteric. Mucosal membranes are moist. Jugular venous pressure is normal. No significant adenopathy/thyromegally appreciated. Lungs are diminished, but clear with reasonable expansion.  On cardiovascular exam, the patient has a regular S1 and S2. Abdomen is soft and non-tender. Extremities reveal no clubbing, cyanosis, or edema.         Family History/Social History/Risk Factors   Patient does not smoke.  Family history reviewed, and family history includes Breast Cancer (age of onset: 75) in her maternal aunt; Cancer in her brother; Colon Cancer (age of onset: 79) in her mother; Lung Cancer (age of onset: 50) in her father; No Known Problems in her sister; Other - See Comments in her brother.          Medical History  Surgical History Family History Social History   Past Medical History:   Diagnosis Date    Allergic rhinitis     Hypertension     Tobacco abuse      Past Surgical History:   Procedure Laterality Date    EP ABLATION PULMONARY VEIN ISOLATION N/A 2023    Procedure: Ablation Atrial Fibrillation;  Surgeon: Vandana Briscoe MD;  [facility]: [facility]    OVARIAN CYST REMOVAL Right      Family History   Problem Relation Age of Onset    Colon Cancer Mother 79            Lung Cancer Father 50            No Known Problems Sister     Other - See Comments Brother         6 brothers and 1 sister    Cancer Brother     Breast Cancer Maternal Aunt 75        Social History     Socioeconomic History    Marital status:      Spouse name: Not on file    Number of children: Not on file    Years of education: Not on file    Highest education level: Not on file   Occupational History    Not on file   Tobacco Use    Smoking status: Former     Packs/day: 0.50     Types:  Cigarettes     Quit date: 2023     Years since quittin.2     Passive exposure: Never    Smokeless tobacco: Never   Vaping Use    Vaping Use: Never used   Substance and Sexual Activity    Alcohol use: No     Comment: Alcoholic Drinks/day: rare    Drug use: No    Sexual activity: Not on file   Other Topics Concern    Not on file   Social History Narrative    [place]  Mynor.  She works for the [employer] in the [employer], .  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  No children.       Social Determinants of Health     Financial Resource Strain: Not on file   Food Insecurity: Not on file   Transportation Needs: Not on file   Physical Activity: Not on file   Stress: Not on file   Social Connections: Not on file   Intimate Partner Violence: Not on file   Housing Stability: Not on file           Medications  Allergies   Current Outpatient Medications   Medication Sig Dispense Refill    albuterol (PROAIR HFA/PROVENTIL HFA/VENTOLIN HFA) 108 (90 Base) MCG/ACT inhaler Inhale 2 puffs into the lungs every 6 hours as needed for shortness of breath, wheezing or cough 18 g 11    amiodarone (PACERONE) 200 MG tablet Take 1 tablet (200 mg) by mouth daily 90 tablet 3    apixaban ANTICOAGULANT (ELIQUIS ANTICOAGULANT) 5 MG tablet Take 1 tablet (5 mg) by mouth 2 times daily 180 tablet 3    atorvastatin (LIPITOR) 20 MG tablet Take 1 tablet (20 mg) by mouth daily 90 tablet 3    cholecalciferol, vitamin D3, (VITAMIN D3) 1,000 unit capsule [CHOLECALCIFEROL, VITAMIN D3, (VITAMIN D3) 1,000 UNIT CAPSULE] Take 1 capsule (1,000 Units total) by mouth daily.  0    furosemide (LASIX) 20 MG tablet Take 1 tablet (20 mg) by mouth daily 90 tablet 1    losartan (COZAAR) 50 MG tablet Take 1 tablet (50 mg) by mouth daily 90 tablet 4    metoprolol succinate ER (TOPROL XL) 50 MG 24 hr tablet Take 1 tablet (50 mg) by mouth daily 90 tablet 3    pantoprazole  (PROTONIX) 40 MG EC tablet Take 1 tablet (40 mg) by mouth daily 90 tablet 0       Allergies   Allergen Reactions    Cat Hair Std Allergenic Ext [Cat Hair Extract] Unknown    Penicillins Shortness Of Breath          Lab Results    Chemistry/lipid CBC Cardiac Enzymes/BNP/TSH/INR   Recent Labs   Lab Test 10/04/22  1214   CHOL 171   HDL 81   LDL 77   TRIG 65     Recent Labs   Lab Test 10/04/22  1214 09/27/21  1508 09/22/20  1507   LDL 77 65 82     Recent Labs   Lab Test 08/16/23  1428      POTASSIUM 4.4   CHLORIDE 100   CO2 30*   GLC 82   BUN 15.9   CR 1.05*   GFRESTIMATED 57*   MYRANDA 9.4     Recent Labs   Lab Test 08/16/23  1428 08/07/23  1343 07/14/23  1234   CR 1.05* 0.95 1.07*     Recent Labs   Lab Test 12/22/16  1117   A1C 5.6          Recent Labs   Lab Test 08/16/23  1428   WBC 8.1   HGB 13.3   HCT 42.5   MCV 89        Recent Labs   Lab Test 08/16/23  1428 08/07/23  1343 07/14/23  1234   HGB 13.3 14.5 13.7    No results for input(s): TROPONINI in the last 78255 hours.  Recent Labs   Lab Test 06/01/23  1037   *     Recent Labs   Lab Test 06/01/23  1037   TSH 0.61     No results for input(s): INR in the last 44949 hours.       Medications  Allergies   Current Outpatient Medications   Medication Sig Dispense Refill    albuterol (PROAIR HFA/PROVENTIL HFA/VENTOLIN HFA) 108 (90 Base) MCG/ACT inhaler Inhale 2 puffs into the lungs every 6 hours as needed for shortness of breath, wheezing or cough 18 g 11    amiodarone (PACERONE) 200 MG tablet Take 1 tablet (200 mg) by mouth daily 90 tablet 3    apixaban ANTICOAGULANT (ELIQUIS ANTICOAGULANT) 5 MG tablet Take 1 tablet (5 mg) by mouth 2 times daily 180 tablet 3    atorvastatin (LIPITOR) 20 MG tablet Take 1 tablet (20 mg) by mouth daily 90 tablet 3    cholecalciferol, vitamin D3, (VITAMIN D3) 1,000 unit capsule [CHOLECALCIFEROL, VITAMIN D3, (VITAMIN D3) 1,000 UNIT CAPSULE] Take 1 capsule (1,000 Units total) by mouth daily.  0    furosemide (LASIX) 20 MG  tablet Take 1 tablet (20 mg) by mouth daily 90 tablet 1    losartan (COZAAR) 50 MG tablet Take 1 tablet (50 mg) by mouth daily 90 tablet 4    metoprolol succinate ER (TOPROL XL) 50 MG 24 hr tablet Take 1 tablet (50 mg) by mouth daily 90 tablet 3    pantoprazole (PROTONIX) 40 MG EC tablet Take 1 tablet (40 mg) by mouth daily 90 tablet 0      Allergies   Allergen Reactions    Cat Hair Std Allergenic Ext [Cat Hair Extract] Unknown    Penicillins Shortness Of Breath          Lab Results   Lab Results   Component Value Date     08/16/2023    CO2 30 08/16/2023    CO2 28 09/17/2022    BUN 15.9 08/16/2023    BUN 11 09/17/2022     Lab Results   Component Value Date    WBC 8.1 08/16/2023    HGB 13.3 08/16/2023    HCT 42.5 08/16/2023    MCV 89 08/16/2023     08/16/2023     Lab Results   Component Value Date    CHOL 171 10/04/2022    TRIG 65 10/04/2022    HDL 81 10/04/2022     Lab Results   Component Value Date     06/01/2023     Lab Results   Component Value Date    TSH 0.61 06/01/2023    TSH 0.62 09/27/2021

## 2023-08-29 NOTE — LETTER
8/29/2023    Tomasz Beltran MD  [address] [facility] [place] [address]    RE: Verena Lagunas       Dear Colleague,     I had the pleasure of seeing Verena Lagunas in the [facility].         [facility] [address], [place] [address]   [email or web address]   OFFICE: [phone]            Impression and Plan     1.  Atrial fibrillation/atrial flutter.  Verena history of atrial fibrillation and underwent concomitant pulmonary vein isolation procedure for atrial fibrillation and CTI ablation for induced CTI-dependent atrial flutter on 21 August 2023.  Twelve-lead ECG in the office today reveals sinus rhythm.  Plan:  Continue amiodarone 200 mg daily for now (can address whether to continue at time of follow-up visit in the Atrial Fibrillation Clinic on 2 October 2023).  Continue apixaban 5 mg twice daily.    2.  Cardiomyopathy.  Echocardiogram 7 June 2023 revealed ejection fraction of 20-25%.  This is felt likely tachycardia mediated from prolonged atrial arrhythmias with rapid ventricular response.  On exam, Verena appears volume neutral.  Normally would consider further augmentation and standard therapy for cardiomyopathy though patient has had some intermittent lightheadedness and systolic blood pressure currently in the 110s  Consequently, for now we will hold off.  I do feel be prudent to exclude ischemic heart disease.  Continue metoprolol succinate 50 mg daily.  Continue losartan 50 mg daily.  Continue furosemide 20 mg daily.  CT coronary angiogram to exclude significant obstructive coronary disease..  Will arrange for follow-up assessment of left ventricular function by echocardiogram in approximately 6-8 weeks (considered cardiac MRI though patient states that she is very claustrophobic).    3.  Hypertension.  Blood pressure is reasonable in the office today at 117/72.  As aforementioned, if anything she has had some tendency toward  lightheadedness and lower blood pressure at times on the current therapy.    Verena is to follow-up in the Atrial Fibrillation Clinic on 2 October 2023.  Follow-up and further recommendations with myself pending CT coronary angiogram findings.      35 minutes spent reviewing prior records (including documentation, laboratory studies, cardiac testing/imaging), interview with patient along with physical exam, planning, and subsequent documentation/crafting of note).           History of Present Illness    Once again I would like to thank you again for asking me to participate in the care of your patient, Verena Lagunas.  As you know, but to reiterate for my own records, Verena Lagunas is a 68 year old female with history of atrial fibrillation and underwent concomitant pulmonary vein isolation procedure for atrial fibrillation and CTI ablation for induced CTI-dependent atrial flutter on 21 August 2023.  She was noted to have progressive dyspnea starting April 2023.  She was subsequently found to have atrial fibrillation with rapid ventricular response in May 2023.  Echocardiogram 7 June 2023 revealed severe depression and left ventricular systolic performance with ejection fraction of 20-25%.    Further review of systems is otherwise negative/noncontributory (medical record and 13 point review of systems reviewed as well and pertinent positives noted).         Cardiac Diagnostics      Echocardiogram 7 June 2023:  Mild left ventricular enlargement with severe depression left ventricular systolic performance ejection fraction 20-25%.  There is severe global reduction left ventricular systolic performance.  Mild-moderate mitral insufficiency.  Mild right ventricular enlargement with moderately reduced right ventricular systolic performance.  Severe left atrial enlargement.  Moderate right atrial enlargement.    Twelve-lead ECG (personally reviewed) 21 August 2023: Sinus rhythm.  ST depression with T wave inversion in  anterolateral leads.           Physical Examination       /72 (BP Location: Left arm, Patient Position: Sitting, Cuff Size: Adult Regular)   Pulse 84   Wt 54 kg (119 lb)   LMP  (LMP Unknown)   SpO2 99%   BMI 19.21 kg/m          Wt Readings from Last 3 Encounters:   23 54 kg (119 lb)   23 53.1 kg (117 lb)   23 53.3 kg (117 lb 6.4 oz)     The patient is alert and oriented times three. Sclerae are anicteric. Mucosal membranes are moist. Jugular venous pressure is normal. No significant adenopathy/thyromegally appreciated. Lungs are diminished, but clear with reasonable expansion.  On cardiovascular exam, the patient has a regular S1 and S2. Abdomen is soft and non-tender. Extremities reveal no clubbing, cyanosis, or edema.         Family History/Social History/Risk Factors   Patient does not smoke.  Family history reviewed, and family history includes Breast Cancer (age of onset: 75) in her maternal aunt; Cancer in her brother; Colon Cancer (age of onset: 79) in her mother; Lung Cancer (age of onset: 50) in her father; No Known Problems in her sister; Other - See Comments in her brother.          Medical History  Surgical History Family History Social History   Past Medical History:   Diagnosis Date    Allergic rhinitis     Hypertension     Tobacco abuse      Past Surgical History:   Procedure Laterality Date    EP ABLATION PULMONARY VEIN ISOLATION N/A 2023    Procedure: Ablation Atrial Fibrillation;  Surgeon: Vandana Briscoe MD;  Location: [facility] CV    OVARIAN CYST REMOVAL Right 1970     Family History   Problem Relation Age of Onset    Colon Cancer Mother 79            Lung Cancer Father 50            No Known Problems Sister     Other - See Comments Brother         6 brothers and 1 sister    Cancer Brother     Breast Cancer Maternal Aunt 75        Social History     Socioeconomic History    Marital status:      Spouse name: Not on file     Number of children: Not on file    Years of education: Not on file    Highest education level: Not on file   Occupational History    Not on file   Tobacco Use    Smoking status: Former     Packs/day: 0.50     Types: Cigarettes     Quit date: 2023     Years since quittin.2     Passive exposure: Never    Smokeless tobacco: Never   Vaping Use    Vaping Use: Never used   Substance and Sexual Activity    Alcohol use: No     Comment: Alcoholic Drinks/day: rare    Drug use: No    Sexual activity: Not on file   Other Topics Concern    Not on file   Social History Narrative    [place]  Mynor.  She works for the [employer] in the [employer], .  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  No children.       Social Determinants of Health     Financial Resource Strain: Not on file   Food Insecurity: Not on file   Transportation Needs: Not on file   Physical Activity: Not on file   Stress: Not on file   Social Connections: Not on file   Intimate Partner Violence: Not on file   Housing Stability: Not on file           Medications  Allergies   Current Outpatient Medications   Medication Sig Dispense Refill    albuterol (PROAIR HFA/PROVENTIL HFA/VENTOLIN HFA) 108 (90 Base) MCG/ACT inhaler Inhale 2 puffs into the lungs every 6 hours as needed for shortness of breath, wheezing or cough 18 g 11    amiodarone (PACERONE) 200 MG tablet Take 1 tablet (200 mg) by mouth daily 90 tablet 3    apixaban ANTICOAGULANT (ELIQUIS ANTICOAGULANT) 5 MG tablet Take 1 tablet (5 mg) by mouth 2 times daily 180 tablet 3    atorvastatin (LIPITOR) 20 MG tablet Take 1 tablet (20 mg) by mouth daily 90 tablet 3    cholecalciferol, vitamin D3, (VITAMIN D3) 1,000 unit capsule [CHOLECALCIFEROL, VITAMIN D3, (VITAMIN D3) 1,000 UNIT CAPSULE] Take 1 capsule (1,000 Units total) by mouth daily.  0    furosemide (LASIX) 20 MG tablet Take 1 tablet (20 mg) by mouth daily 90 tablet 1     losartan (COZAAR) 50 MG tablet Take 1 tablet (50 mg) by mouth daily 90 tablet 4    metoprolol succinate ER (TOPROL XL) 50 MG 24 hr tablet Take 1 tablet (50 mg) by mouth daily 90 tablet 3    pantoprazole (PROTONIX) 40 MG EC tablet Take 1 tablet (40 mg) by mouth daily 90 tablet 0       Allergies   Allergen Reactions    Cat Hair Std Allergenic Ext [Cat Hair Extract] Unknown    Penicillins Shortness Of Breath          Lab Results    Chemistry/lipid CBC Cardiac Enzymes/BNP/TSH/INR   Recent Labs   Lab Test 10/04/22  1214   CHOL 171   HDL 81   LDL 77   TRIG 65     Recent Labs   Lab Test 10/04/22  1214 09/27/21  1508 09/22/20  1507   LDL 77 65 82     Recent Labs   Lab Test 08/16/23  1428      POTASSIUM 4.4   CHLORIDE 100   CO2 30*   GLC 82   BUN 15.9   CR 1.05*   GFRESTIMATED 57*   MYRANDA 9.4     Recent Labs   Lab Test 08/16/23  1428 08/07/23  1343 07/14/23  1234   CR 1.05* 0.95 1.07*     Recent Labs   Lab Test 12/22/16  1117   A1C 5.6          Recent Labs   Lab Test 08/16/23  1428   WBC 8.1   HGB 13.3   HCT 42.5   MCV 89        Recent Labs   Lab Test 08/16/23  1428 08/07/23  1343 07/14/23  1234   HGB 13.3 14.5 13.7    No results for input(s): TROPONINI in the last 54464 hours.  Recent Labs   Lab Test 06/01/23  1037   *     Recent Labs   Lab Test 06/01/23  1037   TSH 0.61     No results for input(s): INR in the last 67487 hours.       Medications  Allergies   Current Outpatient Medications   Medication Sig Dispense Refill    albuterol (PROAIR HFA/PROVENTIL HFA/VENTOLIN HFA) 108 (90 Base) MCG/ACT inhaler Inhale 2 puffs into the lungs every 6 hours as needed for shortness of breath, wheezing or cough 18 g 11    amiodarone (PACERONE) 200 MG tablet Take 1 tablet (200 mg) by mouth daily 90 tablet 3    apixaban ANTICOAGULANT (ELIQUIS ANTICOAGULANT) 5 MG tablet Take 1 tablet (5 mg) by mouth 2 times daily 180 tablet 3    atorvastatin (LIPITOR) 20 MG tablet Take 1 tablet (20 mg) by mouth daily 90 tablet 3     cholecalciferol, vitamin D3, (VITAMIN D3) 1,000 unit capsule [CHOLECALCIFEROL, VITAMIN D3, (VITAMIN D3) 1,000 UNIT CAPSULE] Take 1 capsule (1,000 Units total) by mouth daily.  0    furosemide (LASIX) 20 MG tablet Take 1 tablet (20 mg) by mouth daily 90 tablet 1    losartan (COZAAR) 50 MG tablet Take 1 tablet (50 mg) by mouth daily 90 tablet 4    metoprolol succinate ER (TOPROL XL) 50 MG 24 hr tablet Take 1 tablet (50 mg) by mouth daily 90 tablet 3    pantoprazole (PROTONIX) 40 MG EC tablet Take 1 tablet (40 mg) by mouth daily 90 tablet 0      Allergies   Allergen Reactions    Cat Hair Std Allergenic Ext [Cat Hair Extract] Unknown    Penicillins Shortness Of Breath          Lab Results   Lab Results   Component Value Date     08/16/2023    CO2 30 08/16/2023    CO2 28 09/17/2022    BUN 15.9 08/16/2023    BUN 11 09/17/2022     Lab Results   Component Value Date    WBC 8.1 08/16/2023    HGB 13.3 08/16/2023    HCT 42.5 08/16/2023    MCV 89 08/16/2023     08/16/2023     Lab Results   Component Value Date    CHOL 171 10/04/2022    TRIG 65 10/04/2022    HDL 81 10/04/2022     Lab Results   Component Value Date     06/01/2023     Lab Results   Component Value Date    TSH 0.61 06/01/2023    TSH 0.62 09/27/2021                Thank you for allowing me to participate in the care of your patient.      Sincerely,   Reynaldo Nance MD   [facility]  cc:   Vandana Briscoe MD  [address]  [place] [address]

## 2023-08-30 ENCOUNTER — TELEPHONE (OUTPATIENT)
Dept: INTERNAL MEDICINE | Facility: CLINIC | Age: 68
End: 2023-08-30

## 2023-08-30 LAB
ATRIAL RATE - MUSE: 88 BPM
DIASTOLIC BLOOD PRESSURE - MUSE: NORMAL MMHG
INTERPRETATION ECG - MUSE: NORMAL
P AXIS - MUSE: 95 DEGREES
PR INTERVAL - MUSE: 154 MS
QRS DURATION - MUSE: 98 MS
QT - MUSE: 404 MS
QTC - MUSE: 488 MS
R AXIS - MUSE: 83 DEGREES
SYSTOLIC BLOOD PRESSURE - MUSE: NORMAL MMHG
T AXIS - MUSE: -57 DEGREES
VENTRICULAR RATE- MUSE: 88 BPM

## 2023-08-30 NOTE — TELEPHONE ENCOUNTER
Reason for call:  Other     Patient called regarding (reason for call): form    Additional comments: Fit for duty form is missing the return to work date. Please correct and fax to  [phone] Attn: Meek

## 2023-09-06 DIAGNOSIS — I48.0 PAROXYSMAL ATRIAL FIBRILLATION (H): Primary | ICD-10-CM

## 2023-09-29 NOTE — PROGRESS NOTES
[facility]  Cardiac Electrophysiology  [address]  [place] [address]   Office: [phone]  Fax: [phone]     HEART CARE ELECTROPHYSIOLOGY FOLLOW UP    Primary Care: Tomasz Beltran MD    Assessment/Recommendations     Persistent atrial fibrillation, atrial flutter: Status post atrial fibrillation and CTI ablation 8/21/2023.  She has had no sustained symptoms of arrhythmia. Worsened pleuritic chest pain following ablation - likely secondary to COPD, though cannot rule out post ablation pericarditis.  Her balance has worsened secondary to amiodarone.  -Obtain CBC, BMP, ESR and CRP today  -TTE pending  -Discontinue amiodarone  -Continue metoprolol succinate 50 mg daily  -Pending labs and TTE, consider chest CT for further evaluation    [id number] score of 4 for age 65-74, female gender, cardiomyopathy and hypertension  -Continue Eliquis 5 mg twice a day for stroke prophylaxis    Tachycardia-mediated cardiomyopathy, HFrEF: LVEF 20-25% with severe LV hypokinesia, moderate RV systolic dysfunction, mild/moderate MR, mild pulmonary hypertension.  She has shortness of breath with exertion, orthopnea, intermittent pedal edema.  No overt hypervolemia today.  On ARB, beta blocker, furosemide  -Arrhythmia management as above  -Pending TTE, coronary CTA after our visit today     Essential hypertension: Well managed on current regimen    Follow up: with myself in 6 weeks       History of Present Illness/Subjective    Verena Lagunas is a 68 year old female who is seen today for follow up status post atrial fibrillation ablation with . She has a past medical history significant for HFrEF, essential hypertension, hyperlipidemia, prior nicotine use, COPD, Meniere's disease, hearing impairment.  She underwent catheter ablation 8/21/2023 including pulmonary vein isolation and right atrial CTI flutter.  She remains on amiodarone 200 mg daily and metoprolol  succinate 50 mg daily.  Shortness of breath has improved following ablation.  Prior to ablation she had chest discomfort and tightness with taking deep breaths which is somewhat worse following ablation.  She has occasional sensation of pounding heart, occurring intermittently, some days not at all and other days up to 3 times per day, lasting a few minutes. She is consistently dizzy with position changes and bending over; her balance has worsened over the past month or so. She has orthopnea and intermittent pedal edema. She denies groin site issues, heartburn, difficulty swallowing, or neurologic changes. She denies syncope.       Data Review     Arrhythmia hx:   Dx/date: Persistent AF with RVR May 2023, onset of progressive dyspnea 1 month prior.  TTE significant for LVEF 20-25%, severe LV hypokinesia, mild RV systolic dysfunction, severe LAE.  DCCV 2023 after amiodarone loading regimen.   Sx: Dyspnea, tachypalpitations, dizziness, chest discomfort  Prior AAD, AV steve blocking agents: Amiodarone, metoprolol  Procedures  DCCV: 2023  Ablation: 2023, Dr. Briscoe-pulmonary vein isolation, CTI ablation     EK2023: SB 59 bpm, occasional ventricular ectopy, T wave inversion to inferorolateral leads c/w prior, QRS 96 ms, QT/QTc 434/429 ms  2023: Sinus rhythm 69 bpm, occasional atrial ectopy, T wave inversion to inferolateral leads,  ms, QT/QTc 402/430 ms  2023: AF with  bpm, QRS 90 ms, QT/QTc 324/493 ms  Personally reviewed.      TTE: 2023  The left ventricle is mildly dilated.  Left ventricular function is decreased. The ejection fraction is 20-25%  (severely reduced).  Left ventricular diastolic function is abnormal.  There is severe global hypokinesia of the left ventricle.  The right ventricle is mildly dilated. Moderately decreased right ventricular  systolic function  The left atrium is severely dilated. The right atrium is moderately dilated.  Mild mitral valve  prolapse, bileaflet. There is mild to moderate (1-2+) mitral  regurgitation.  Right ventricular systolic pressure is elevated, consistent with mild  pulmonary hypertension.    I have reviewed and updated the patient's past medical history, allergy list and medication list.                Physical Examination Review of Systems   Vitals: /62 (BP Location: Right arm, Patient Position: Sitting, Cuff Size: Adult Regular)   Pulse 80   Resp 18   Wt 55.8 kg (123 lb)   LMP  (LMP Unknown)   SpO2 95%   BMI 19.85 kg/m      BMI= Body mass index is 19.85 kg/m .    Wt Readings from Last 3 Encounters:   10/02/23 55.8 kg (123 lb)   23 54 kg (119 lb)   23 53.1 kg (117 lb)       General   Appearance:   Alert and oriented, in no acute distress.    HEENT:  Normocephalic and atraumatic. Conjunctiva and sclera are clear. Moist oral mucosa.    Neck: No JVP, carotid bruit or obvious thyromegaly.   Lungs:   Respirations unlabored. Faint crackles to bilateral bases, no wheezing or rhonchi   Cardiovascular:   Rhythm is regular. S1 and S2 are normal. No significant murmur is present. Lower extremities demonstrate no significant edema. Posterior tibial pulses are intact bilaterally.   Extremities: No cyanosis or clubbing   Skin: Skin is warm, dry, and otherwise intact.   Neurologic: Gait not assessed. Mood and affect appropriate.    A 12 point comprehensive review of systems was  negative except as noted.      Medical History  Surgical History Family History Social History   Past Medical History:   Diagnosis Date    Allergic rhinitis     Hypertension     Tobacco abuse     Past Surgical History:   Procedure Laterality Date    EP ABLATION PULMONARY VEIN ISOLATION N/A 2023    Procedure: Ablation Atrial Fibrillation;  Surgeon: Vandana Briscoe MD;  Location: [facility] CV    OVARIAN CYST REMOVAL Right     Family History   Problem Relation Age of Onset    Colon Cancer Mother 79            Lung  Cancer Father 50            No Known Problems Sister     Other - See Comments Brother         6 brothers and 1 sister    Cancer Brother     Breast Cancer Maternal Aunt 75    Social History     Socioeconomic History    Marital status:      Spouse name: Not on file    Number of children: Not on file    Years of education: Not on file    Highest education level: Not on file   Occupational History    Not on file   Tobacco Use    Smoking status: Former     Packs/day: 0.50     Types: Cigarettes     Quit date: 2023     Years since quittin.3     Passive exposure: Never    Smokeless tobacco: Never   Vaping Use    Vaping Use: Never used   Substance and Sexual Activity    Alcohol use: No     Comment: Alcoholic Drinks/day: rare    Drug use: No    Sexual activity: Not on file   Other Topics Concern    Not on file   Social History Narrative    [place]  Mynor.  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  No children.       Social Determinants of Health     Financial Resource Strain: Not on file   Food Insecurity: Not on file   Transportation Needs: Not on file   Physical Activity: Not on file   Stress: Not on file   Social Connections: Not on file   Interpersonal Safety: Not on file   Housing Stability: Not on file          Medications  Allergies   Scheduled Meds:  Current Outpatient Medications   Medication Sig Dispense Refill    amiodarone (PACERONE) 200 MG tablet Take 1 tablet (200 mg) by mouth daily 90 tablet 3    apixaban ANTICOAGULANT (ELIQUIS ANTICOAGULANT) 5 MG tablet Take 1 tablet (5 mg) by mouth 2 times daily 180 tablet 3    atorvastatin (LIPITOR) 20 MG tablet Take 1 tablet (20 mg) by mouth daily 90 tablet 3    cholecalciferol, vitamin D3, (VITAMIN D3) 1,000 unit capsule [CHOLECALCIFEROL, VITAMIN D3, (VITAMIN D3) 1,000 UNIT CAPSULE] Take 1 capsule (1,000 Units  total) by mouth daily.  0    furosemide (LASIX) 20 MG tablet Take 1 tablet (20 mg) by mouth daily 90 tablet 1    losartan (COZAAR) 50 MG tablet Take 1 tablet (50 mg) by mouth daily 90 tablet 4    metoprolol succinate ER (TOPROL XL) 50 MG 24 hr tablet Take 1 tablet (50 mg) by mouth daily 90 tablet 3    albuterol (PROAIR HFA/PROVENTIL HFA/VENTOLIN HFA) 108 (90 Base) MCG/ACT inhaler Inhale 2 puffs into the lungs every 6 hours as needed for shortness of breath, wheezing or cough (Patient not taking: Reported on 10/2/2023) 18 g 11    Allergies   Allergen Reactions    Cat Hair Std Allergenic Ext [Cat Hair Extract] Unknown    Penicillins Shortness Of Breath         Lab Results    Chemistry/lipid CBC Cardiac Enzymes/BNP/TSH/INR   Lab Results   Component Value Date    CHOL 171 10/04/2022    HDL 81 10/04/2022    TRIG 65 10/04/2022    BUN 15.9 2023     2023    CO2 30 (H) 2023    Lab Results   Component Value Date    WBC 8.1 2023    HGB 13.3 2023    HCT 42.5 2023    MCV 89 2023     2023    @RESUFAST(BMP,CBC,BNP,TSH,  INR)@      52 minutes spent reviewing prior records (including documentation, laboratory studies, cardiac testing/imaging), history and physical exam, planning, and subsequent documentation.     This note has been dictated using voice recognition software. Any grammatical, typographical, or context distortions are unintentional and inherent to the software.    Carmen Plaza, CNP  Clinical Cardiac Electrophysiology  [facility]  Clinic and [phone]  Fax: [phone]  Electrophysiology Nurses: [phone]     DISCHARGE

## 2023-10-02 ENCOUNTER — OFFICE VISIT (OUTPATIENT)
Dept: CARDIOLOGY | Facility: CLINIC | Age: 68
End: 2023-10-02
Attending: INTERNAL MEDICINE
Payer: COMMERCIAL

## 2023-10-02 VITALS
WEIGHT: 123 LBS | RESPIRATION RATE: 18 BRPM | SYSTOLIC BLOOD PRESSURE: 112 MMHG | DIASTOLIC BLOOD PRESSURE: 62 MMHG | BODY MASS INDEX: 19.85 KG/M2 | HEART RATE: 80 BPM | OXYGEN SATURATION: 95 %

## 2023-10-02 DIAGNOSIS — I50.22 CHRONIC SYSTOLIC HEART FAILURE (H): ICD-10-CM

## 2023-10-02 DIAGNOSIS — H81.09 MENIERE'S DISEASE, UNSPECIFIED LATERALITY: ICD-10-CM

## 2023-10-02 DIAGNOSIS — I10 ESSENTIAL HYPERTENSION: ICD-10-CM

## 2023-10-02 DIAGNOSIS — Z98.890 STATUS POST CATHETER ABLATION OF ATRIAL FIBRILLATION: ICD-10-CM

## 2023-10-02 DIAGNOSIS — I48.19 PERSISTENT ATRIAL FIBRILLATION (H): Primary | ICD-10-CM

## 2023-10-02 LAB
ANION GAP SERPL CALCULATED.3IONS-SCNC: 9 MMOL/L (ref 7–15)
BUN SERPL-MCNC: 15 MG/DL (ref 8–23)
CALCIUM SERPL-MCNC: 8.9 MG/DL (ref 8.8–10.2)
CHLORIDE SERPL-SCNC: 97 MMOL/L (ref 98–107)
CREAT SERPL-MCNC: 0.98 MG/DL (ref 0.51–0.95)
CRP SERPL-MCNC: <3 MG/L
DEPRECATED HCO3 PLAS-SCNC: 31 MMOL/L (ref 22–29)
EGFRCR SERPLBLD CKD-EPI 2021: 62 ML/MIN/1.73M2
ERYTHROCYTE [DISTWIDTH] IN BLOOD BY AUTOMATED COUNT: 13.7 % (ref 10–15)
ERYTHROCYTE [SEDIMENTATION RATE] IN BLOOD BY WESTERGREN METHOD: 9 MM/HR (ref 0–30)
GLUCOSE SERPL-MCNC: 61 MG/DL (ref 70–99)
HCT VFR BLD AUTO: 40.8 % (ref 35–47)
HGB BLD-MCNC: 13.7 G/DL (ref 11.7–15.7)
MCH RBC QN AUTO: 29.5 PG (ref 26.5–33)
MCHC RBC AUTO-ENTMCNC: 33.6 G/DL (ref 31.5–36.5)
MCV RBC AUTO: 88 FL (ref 78–100)
PLATELET # BLD AUTO: 265 10E3/UL (ref 150–450)
POTASSIUM SERPL-SCNC: 4.7 MMOL/L (ref 3.4–5.3)
RBC # BLD AUTO: 4.64 10E6/UL (ref 3.8–5.2)
SODIUM SERPL-SCNC: 137 MMOL/L (ref 135–145)
WBC # BLD AUTO: 7.4 10E3/UL (ref 4–11)

## 2023-10-02 PROCEDURE — 80048 BASIC METABOLIC PNL TOTAL CA: CPT | Performed by: NURSE PRACTITIONER

## 2023-10-02 PROCEDURE — 85027 COMPLETE CBC AUTOMATED: CPT | Performed by: NURSE PRACTITIONER

## 2023-10-02 PROCEDURE — 99215 OFFICE O/P EST HI 40 MIN: CPT | Performed by: NURSE PRACTITIONER

## 2023-10-02 PROCEDURE — 36415 COLL VENOUS BLD VENIPUNCTURE: CPT | Performed by: NURSE PRACTITIONER

## 2023-10-02 PROCEDURE — 85652 RBC SED RATE AUTOMATED: CPT | Performed by: NURSE PRACTITIONER

## 2023-10-02 PROCEDURE — 86140 C-REACTIVE PROTEIN: CPT | Performed by: NURSE PRACTITIONER

## 2023-10-02 NOTE — PATIENT INSTRUCTIONS
Verena Lagunas,    It was a pleasure to see you today at the [facility].     My recommendations after this visit include:  -Discontinue amiodarone today  -I will post lab results to My Chart  -Follow up with me in about 6 weeks    Please do not hesitate to call with additional questions or concerns.     Carmen Plaza, CNP  Clinical Cardiac Electrophysiology  [facility]  Clinic and [phone]  Fax: [phone]  Electrophysiology Nurses: [phone]

## 2023-10-02 NOTE — LETTER
10/2/2023    Tomasz Beltran MD  [address] [facility] [address]    RE: Verena Lagunas       Dear Colleague,     I had the pleasure of seeing Verena Lagunas in the [facility].       [facility]  Cardiac Electrophysiology  [address]  [place] [address]   Office: [phone]  Fax: [phone]     HEART CARE ELECTROPHYSIOLOGY FOLLOW UP    Primary Care: Tomasz Beltran MD    Assessment/Recommendations     Persistent atrial fibrillation, atrial flutter: Status post atrial fibrillation and CTI ablation 8/21/2023.  She has had no sustained symptoms of arrhythmia. Worsened pleuritic chest pain following ablation - likely secondary to COPD, though cannot rule out post ablation pericarditis.  Her balance has worsened secondary to amiodarone.  -Obtain CBC, BMP, ESR and CRP today  -TTE pending  -Discontinue amiodarone  -Continue metoprolol succinate 50 mg daily  -Pending labs and TTE, consider chest CT for further evaluation    [id number] score of 4 for age 65-74, female gender, cardiomyopathy and hypertension  -Continue Eliquis 5 mg twice a day for stroke prophylaxis    Tachycardia-mediated cardiomyopathy, HFrEF: LVEF 20-25% with severe LV hypokinesia, moderate RV systolic dysfunction, mild/moderate MR, mild pulmonary hypertension.  She has shortness of breath with exertion, orthopnea, intermittent pedal edema.  No overt hypervolemia today.  On ARB, beta blocker, furosemide  -Arrhythmia management as above  -Pending TTE, coronary CTA after our visit today     Essential hypertension: Well managed on current regimen    Follow up: with myself in 6 weeks       History of Present Illness/Subjective    Verena aLgunas is a 68 year old female who is seen today for follow up status post atrial fibrillation ablation with . She has a past medical history significant for HFrEF, essential hypertension, hyperlipidemia, prior nicotine use,  COPD, Meniere's disease, hearing impairment.  She underwent catheter ablation 2023 including pulmonary vein isolation and right atrial CTI flutter.  She remains on amiodarone 200 mg daily and metoprolol succinate 50 mg daily.  Shortness of breath has improved following ablation.  Prior to ablation she had chest discomfort and tightness with taking deep breaths which is somewhat worse following ablation.  She has occasional sensation of pounding heart, occurring intermittently, some days not at all and other days up to 3 times per day, lasting a few minutes. She is consistently dizzy with position changes and bending over; her balance has worsened over the past month or so. She has orthopnea and intermittent pedal edema. She denies groin site issues, heartburn, difficulty swallowing, or neurologic changes. She denies syncope.       Data Review     Arrhythmia hx:   Dx/date: Persistent AF with RVR May 2023, onset of progressive dyspnea 1 month prior.  TTE significant for LVEF 20-25%, severe LV hypokinesia, mild RV systolic dysfunction, severe LAE.  DCCV 2023 after amiodarone loading regimen.   Sx: Dyspnea, tachypalpitations, dizziness, chest discomfort  Prior AAD, AV steve blocking agents: Amiodarone, metoprolol  Procedures  DCCV: 2023  Ablation: 2023, Dr. Briscoe-pulmonary vein isolation, CTI ablation     EK2023: SB 59 bpm, occasional ventricular ectopy, T wave inversion to inferorolateral leads c/w prior, QRS 96 ms, QT/QTc 434/429 ms  2023: Sinus rhythm 69 bpm, occasional atrial ectopy, T wave inversion to inferolateral leads,  ms, QT/QTc 402/430 ms  2023: AF with  bpm, QRS 90 ms, QT/QTc 324/493 ms  Personally reviewed.      TTE: 2023  The left ventricle is mildly dilated.  Left ventricular function is decreased. The ejection fraction is 20-25%  (severely reduced).  Left ventricular diastolic function is abnormal.  There is severe global hypokinesia of the left  ventricle.  The right ventricle is mildly dilated. Moderately decreased right ventricular  systolic function  The left atrium is severely dilated. The right atrium is moderately dilated.  Mild mitral valve prolapse, bileaflet. There is mild to moderate (1-2+) mitral  regurgitation.  Right ventricular systolic pressure is elevated, consistent with mild  pulmonary hypertension.    I have reviewed and updated the patient's past medical history, allergy list and medication list.                Physical Examination Review of Systems   Vitals: /62 (BP Location: Right arm, Patient Position: Sitting, Cuff Size: Adult Regular)   Pulse 80   Resp 18   Wt 55.8 kg (123 lb)   LMP  (LMP Unknown)   SpO2 95%   BMI 19.85 kg/m      BMI= Body mass index is 19.85 kg/m .    Wt Readings from Last 3 Encounters:   10/02/23 55.8 kg (123 lb)   08/29/23 54 kg (119 lb)   08/21/23 53.1 kg (117 lb)       General   Appearance:   Alert and oriented, in no acute distress.    HEENT:  Normocephalic and atraumatic. Conjunctiva and sclera are clear. Moist oral mucosa.    Neck: No JVP, carotid bruit or obvious thyromegaly.   Lungs:   Respirations unlabored. Faint crackles to bilateral bases, no wheezing or rhonchi   Cardiovascular:   Rhythm is regular. S1 and S2 are normal. No significant murmur is present. Lower extremities demonstrate no significant edema. Posterior tibial pulses are intact bilaterally.   Extremities: No cyanosis or clubbing   Skin: Skin is warm, dry, and otherwise intact.   Neurologic: Gait not assessed. Mood and affect appropriate.    A 12 point comprehensive review of systems was  negative except as noted.      Medical History  Surgical History Family History Social History   Past Medical History:   Diagnosis Date    Allergic rhinitis     Hypertension     Tobacco abuse     Past Surgical History:   Procedure Laterality Date    EP ABLATION PULMONARY VEIN ISOLATION N/A 8/21/2023    Procedure: Ablation Atrial Fibrillation;   Surgeon: Vandana Briscoe MD;  Location: [facility] CV    OVARIAN CYST REMOVAL Right 1970    Family History   Problem Relation Age of Onset    Colon Cancer Mother 79            Lung Cancer Father 50            No Known Problems Sister     Other - See Comments Brother         6 brothers and 1 sister    Cancer Brother     Breast Cancer Maternal Aunt 75    Social History     Socioeconomic History    Marital status:      Spouse name: Not on file    Number of children: Not on file    Years of education: Not on file    Highest education level: Not on file   Occupational History    Not on file   Tobacco Use    Smoking status: Former     Packs/day: 0.50     Types: Cigarettes     Quit date: 2023     Years since quittin.3     Passive exposure: Never    Smokeless tobacco: Never   Vaping Use    Vaping Use: Never used   Substance and Sexual Activity    Alcohol use: No     Comment: Alcoholic Drinks/day: rare    Drug use: No    Sexual activity: Not on file   Other Topics Concern    Not on file   Social History Narrative    [place]  Mynor.  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  No children.       Social Determinants of Health     Financial Resource Strain: Not on file   Food Insecurity: Not on file   Transportation Needs: Not on file   Physical Activity: Not on file   Stress: Not on file   Social Connections: Not on file   Interpersonal Safety: Not on file   Housing Stability: Not on file          Medications  Allergies   Scheduled Meds:  Current Outpatient Medications   Medication Sig Dispense Refill    amiodarone (PACERONE) 200 MG tablet Take 1 tablet (200 mg) by mouth daily 90 tablet 3    apixaban ANTICOAGULANT (ELIQUIS ANTICOAGULANT) 5 MG tablet Take 1 tablet (5 mg) by mouth 2 times daily 180 tablet 3    atorvastatin (LIPITOR) 20 MG tablet Take  1 tablet (20 mg) by mouth daily 90 tablet 3    cholecalciferol, vitamin D3, (VITAMIN D3) 1,000 unit capsule [CHOLECALCIFEROL, VITAMIN D3, (VITAMIN D3) 1,000 UNIT CAPSULE] Take 1 capsule (1,000 Units total) by mouth daily.  0    furosemide (LASIX) 20 MG tablet Take 1 tablet (20 mg) by mouth daily 90 tablet 1    losartan (COZAAR) 50 MG tablet Take 1 tablet (50 mg) by mouth daily 90 tablet 4    metoprolol succinate ER (TOPROL XL) 50 MG 24 hr tablet Take 1 tablet (50 mg) by mouth daily 90 tablet 3    albuterol (PROAIR HFA/PROVENTIL HFA/VENTOLIN HFA) 108 (90 Base) MCG/ACT inhaler Inhale 2 puffs into the lungs every 6 hours as needed for shortness of breath, wheezing or cough (Patient not taking: Reported on 10/2/2023) 18 g 11    Allergies   Allergen Reactions    Cat Hair Std Allergenic Ext [Cat Hair Extract] Unknown    Penicillins Shortness Of Breath         Lab Results    Chemistry/lipid CBC Cardiac Enzymes/BNP/TSH/INR   Lab Results   Component Value Date    CHOL 171 10/04/2022    HDL 81 10/04/2022    TRIG 65 10/04/2022    BUN 15.9 2023     2023    CO2 30 (H) 2023    Lab Results   Component Value Date    WBC 8.1 2023    HGB 13.3 2023    HCT 42.5 2023    MCV 89 2023     2023    @RESUFAST(BMP,CBC,BNP,TSH,  INR)@      52 minutes spent reviewing prior records (including documentation, laboratory studies, cardiac testing/imaging), history and physical exam, planning, and subsequent documentation.     This note has been dictated using voice recognition software. Any grammatical, typographical, or context distortions are unintentional and inherent to the software.    Carmen Plaza CNP  Clinical Cardiac Electrophysiology  [facility]  Clinic and [phone]  Fax: [phone]  Electrophysiology Nurses: [phone]        Thank you for allowing me to participate in the care of your patient.      Sincerely,     SARAH LE  DERRELL     [facility]  cc:   Vandana Briscoe MD  [address]  [place] [address]

## 2023-10-12 DIAGNOSIS — R07.9 CHEST PAIN: Primary | ICD-10-CM

## 2023-10-12 RX ORDER — COLCHICINE 0.6 MG/1
0.6 TABLET ORAL 2 TIMES DAILY
Qty: 20 TABLET | Refills: 0 | Status: SHIPPED | OUTPATIENT
Start: 2023-10-12 | End: 2024-03-18

## 2023-10-14 ENCOUNTER — HEALTH MAINTENANCE LETTER (OUTPATIENT)
Age: 68
End: 2023-10-14

## 2023-10-23 ENCOUNTER — HOSPITAL ENCOUNTER (OUTPATIENT)
Dept: CARDIOLOGY | Facility: HOSPITAL | Age: 68
Discharge: HOME | End: 2023-10-23
Attending: INTERNAL MEDICINE | Admitting: INTERNAL MEDICINE
Payer: COMMERCIAL

## 2023-10-23 DIAGNOSIS — I42.8 OTHER CARDIOMYOPATHY (H): ICD-10-CM

## 2023-10-23 LAB — LVEF ECHO: NORMAL

## 2023-10-23 PROCEDURE — T1013 SIGN LANG/ORAL INTERPRETER: HCPCS | Mod: U3

## 2023-10-23 PROCEDURE — 93306 TTE W/DOPPLER COMPLETE: CPT

## 2023-10-23 PROCEDURE — 93306 TTE W/DOPPLER COMPLETE: CPT | Mod: 26 | Performed by: INTERNAL MEDICINE

## 2023-10-25 ENCOUNTER — TELEPHONE (OUTPATIENT)
Dept: CARDIOLOGY | Facility: CLINIC | Age: 68
End: 2023-10-25

## 2023-10-25 NOTE — TELEPHONE ENCOUNTER
1st Attempt to contact pt, unable to leave voicemail.  Will attempt again  10/25/2023 12:51 PM  Tamia Bowling RN

## 2023-10-25 NOTE — TELEPHONE ENCOUNTER
Rikc Morales,     I called and spoke with patient.   She is agreeable to CT, will have scheduling contact patient.     She describes her chest pain being ongoing and no better since starting the colchicine.  She describes back pain and trouble taking deep breaths.  Sounds like her PCP prescribed an inhaler which doesn't seem to be helping either.    She's anxious for answers and relief.    Scheduling (please call her home phone, it connects with an ) thanks!    Any further recommendations?  Thank you!  Josephine

## 2023-10-25 NOTE — TELEPHONE ENCOUNTER
Noted.  Pt was seen in clinic on 10-2-23 with Carmen Plaza, follow up after PVI 8-21-23 with Dr. Briscoe.    Pt did have worsened pleuritic chest pain following ablation - likely secondary to COPD, though cannot rule out post ablation pericarditis.  TTE previously ordered, labs ordered including CRP, ESR, BMP and CBC.    Labs were wnl.  Pending labs and TTE a chest CT would be considered for further evaluation.    Will review with Carmen for any additional recommendations.

## 2023-10-25 NOTE — TELEPHONE ENCOUNTER
Return call and voicemail message left from patient X2.  Attempted to call cell phone, line disconnects and unable to leave message.  Attempted to call home phone and phone is answered by an , call transferred and rang twice and then nothing.    Will attempt at a later time.

## 2023-10-25 NOTE — TELEPHONE ENCOUNTER
Echo reviewed.  No effusion which is reassuring.  LVEF improved 45-50%.  At our last visit she wanted to defer coronary CT ordered by Dr. Nance but given that her EF is still borderline recommend she reconsider doing so.  Given these results and normal labs less suspicious for ablation complications regarding her chest discomfort.  Let me know how she is doing after the colchicine.  Thank you

## 2023-10-25 NOTE — TELEPHONE ENCOUNTER
[facility] Call Center    Phone Message    May a detailed message be left on voicemail: yes     Reason for Call: Other: Pt is requesting a callback to review ECHO     Action Taken: Other: cardio    Travel Screening: Not Applicable    Thank you!  [facility]

## 2023-10-26 ENCOUNTER — TELEPHONE (OUTPATIENT)
Dept: INTERNAL MEDICINE | Facility: CLINIC | Age: 68
End: 2023-10-26

## 2023-10-26 NOTE — TELEPHONE ENCOUNTER
Noted.  Phone call to patients home number, no answer and message left with  for return call.  Also explained in message that a my chart message with the below information will be sent as well..

## 2023-10-26 NOTE — TELEPHONE ENCOUNTER
October 26, 2023    Progress report was picked up from outbox of Dr. Pretty.  Paperwork has been reviewed and is complete.  Per initial initial request, this was sent via fax to [phone].     Shantel Clay

## 2023-10-26 NOTE — TELEPHONE ENCOUNTER
Carmen Plaza APRN CNP William, Amila Dilusha, MD; [facility] Ep Support Pool - e40 minutes ago (11:43 AM)     EM  Thanks Dr. Briscoe. I think makes the most sense to proceed with cCTA as requested by Dr. Nance - low yield and contrast exposure with CT. EP team can you please let her know she can schedule at her convenience? Thank you       Hi Carmen,    Thanks for the message, and thank you for seeing her.  Agree - it seems unlikely that her chest pain is related to post-ablation pericarditis.  Inspiratory 2 view CXR may be helpful to evaluate for possible right phrenic injury (re dyspnea, would likely not explain pain).  I do think a CT with IV contrast and attention to LA/esophagus would be reasonable, though at this juncture (~8wks) AEF is also unlikely.      Thank you!  Keep me posted!  Carmen Whitlock APRN CNP William, Amila Dilusha, MD; [facility] Ep Support Pool - e20 hours ago (3:52 PM)     EM  Hi Dr. Briscoe,    Pt of yours sp PVI/CTI ablation 8/21/2023.  At our follow-up 10/2 she noted worsening pleuritic chest discomfort, somewhat worse following ablation.  No leukocytosis, ESR/CRP within normal limits.  I trialed 10-day course of colchicine but she reports no significant improvement. Would you recommend further evaluation ? chest CT? I also encouraged her to schedule cCTA ordered by Dr. Nance which she had deferred initially. Thank you!!

## 2023-11-14 ENCOUNTER — TELEPHONE (OUTPATIENT)
Dept: CARDIOLOGY | Facility: CLINIC | Age: 68
End: 2023-11-14

## 2023-11-14 NOTE — TELEPHONE ENCOUNTER
PC back to pt  Nursing staff from imaging center for CTA left VM for pt with instructions  Pt did not understand the instructions and is requesting clarification  Pt was given dept phone number for CTA staff to CB  Pt verbalized understanding, has no further questions or concerns at this time, and has our contact information if needed.  11/14/2023 10:21 AM  Tamia Leone RN

## 2023-11-14 NOTE — TELEPHONE ENCOUNTER
[facility] Call Center    Phone Message    May a detailed message be left on voicemail: yes     Reason for Call: Other: Pt needs a call back on clarifications regarding medication to stop for ct angio     Action Taken: Other: Cardio    Travel Screening: Not Applicable

## 2023-11-15 ENCOUNTER — HOSPITAL ENCOUNTER (OUTPATIENT)
Dept: CT IMAGING | Facility: CLINIC | Age: 68
Discharge: HOME | End: 2023-11-15
Attending: INTERNAL MEDICINE | Admitting: INTERNAL MEDICINE
Payer: COMMERCIAL

## 2023-11-15 VITALS — TEMPERATURE: 84 F | DIASTOLIC BLOOD PRESSURE: 65 MMHG | SYSTOLIC BLOOD PRESSURE: 120 MMHG | HEART RATE: 84 BPM

## 2023-11-15 DIAGNOSIS — I42.8 OTHER CARDIOMYOPATHY (H): ICD-10-CM

## 2023-11-15 DIAGNOSIS — I48.91 ATRIAL FIBRILLATION WITH RVR (H): ICD-10-CM

## 2023-11-15 LAB
BSA FOR ECHO PROCEDURE: 0 M2
CREAT BLD-MCNC: 0.9 MG/DL (ref 0.6–1.1)
CV CALCIUM SCORE AGATSTON LM: 32
CV CALCIUM SCORING AGATSON LAD: 0
CV CALCIUM SCORING AGATSTON CX: 0
CV CALCIUM SCORING AGATSTON RCA: 2
CV CALCIUM SCORING AGATSTON TOTAL: 34
EGFRCR SERPLBLD CKD-EPI 2021: >60 ML/MIN/1.73M2

## 2023-11-15 PROCEDURE — 250N000009 HC RX 250: Performed by: INTERNAL MEDICINE

## 2023-11-15 PROCEDURE — 75574 CT ANGIO HRT W/3D IMAGE: CPT | Mod: 26 | Performed by: INTERNAL MEDICINE

## 2023-11-15 PROCEDURE — 250N000011 HC RX IP 250 OP 636: Performed by: INTERNAL MEDICINE

## 2023-11-15 PROCEDURE — 82565 ASSAY OF CREATININE: CPT

## 2023-11-15 PROCEDURE — 250N000013 HC RX MED GY IP 250 OP 250 PS 637: Performed by: INTERNAL MEDICINE

## 2023-11-15 PROCEDURE — 75574 CT ANGIO HRT W/3D IMAGE: CPT

## 2023-11-15 RX ORDER — DILTIAZEM HYDROCHLORIDE 5 MG/ML
5 INJECTION INTRAVENOUS
Status: DISCONTINUED | OUTPATIENT
Start: 2023-11-15 | End: 2023-11-16 | Stop reason: HOSPADM

## 2023-11-15 RX ORDER — METOPROLOL TARTRATE 1 MG/ML
5 INJECTION, SOLUTION INTRAVENOUS
Status: COMPLETED | OUTPATIENT
Start: 2023-11-15 | End: 2023-11-15

## 2023-11-15 RX ORDER — NITROGLYCERIN 0.4 MG/1
0.4 TABLET SUBLINGUAL ONCE
Status: COMPLETED | OUTPATIENT
Start: 2023-11-15 | End: 2023-11-15

## 2023-11-15 RX ORDER — DILTIAZEM HYDROCHLORIDE 5 MG/ML
10 INJECTION INTRAVENOUS
Status: DISCONTINUED | OUTPATIENT
Start: 2023-11-15 | End: 2023-11-16 | Stop reason: HOSPADM

## 2023-11-15 RX ORDER — LIDOCAINE 40 MG/G
CREAM TOPICAL
Status: DISCONTINUED | OUTPATIENT
Start: 2023-11-15 | End: 2023-11-16 | Stop reason: HOSPADM

## 2023-11-15 RX ORDER — IOPAMIDOL 755 MG/ML
100 INJECTION, SOLUTION INTRAVASCULAR ONCE
Status: COMPLETED | OUTPATIENT
Start: 2023-11-15 | End: 2023-11-15

## 2023-11-15 RX ADMIN — IOPAMIDOL 100 ML: 755 INJECTION, SOLUTION INTRAVENOUS at 09:29

## 2023-11-15 RX ADMIN — METOPROLOL TARTRATE 5 MG: 1 INJECTION, SOLUTION INTRAVENOUS at 09:08

## 2023-11-15 RX ADMIN — METOPROLOL TARTRATE 5 MG: 1 INJECTION, SOLUTION INTRAVENOUS at 09:14

## 2023-11-15 RX ADMIN — NITROGLYCERIN 0.4 MG: 0.4 TABLET SUBLINGUAL at 09:17

## 2023-11-15 RX ADMIN — METOPROLOL TARTRATE 5 MG: 1 INJECTION, SOLUTION INTRAVENOUS at 09:04

## 2023-11-15 RX ADMIN — METOPROLOL TARTRATE 5 MG: 1 INJECTION, SOLUTION INTRAVENOUS at 09:11

## 2023-11-20 DIAGNOSIS — Z98.890 STATUS POST CATHETER ABLATION OF ATRIAL FIBRILLATION: ICD-10-CM

## 2023-11-20 DIAGNOSIS — I48.91 ATRIAL FIBRILLATION WITH RVR (H): Primary | ICD-10-CM

## 2023-11-20 DIAGNOSIS — I50.22 CHRONIC SYSTOLIC HEART FAILURE (H): ICD-10-CM

## 2023-11-20 DIAGNOSIS — I48.19 PERSISTENT ATRIAL FIBRILLATION (H): ICD-10-CM

## 2023-11-20 DIAGNOSIS — I10 ESSENTIAL HYPERTENSION: ICD-10-CM

## 2023-11-20 DIAGNOSIS — I42.8 OTHER CARDIOMYOPATHY (H): ICD-10-CM

## 2023-12-01 NOTE — PROGRESS NOTES
[facility]  Cardiac Electrophysiology  [address]  [place] [address]   Office: [phone]  Fax: [phone]     HEART CARE ELECTROPHYSIOLOGY FOLLOW UP    Primary Care: Tomasz Beltran MD      Assessment/Recommendations     Persistent atrial fibrillation, atrial flutter: Status post atrial fibrillation and CTI ablation 8/21/2023.  Amiodarone discontinued 10/2/2023 with improvement in balance.  She continues to have intermittent palpitations, chest discomfort and shortness of breath with exertion. No documentation of recurrent atrial fibrillation following ablation.  We discussed symptomatology may represent paroxysmal AF, alternative atrial arrhythmias or atrial or ventricular ectopy, the latter being most likely.  She has a history of prior nicotine use and chronic bronchitis likely also contributing to shortness of breath and chest tightness.  Plan to continue beta-blocker therapy and undergo JONAS.  Pending JONAS results could consider taper and discontinuation of beta-blocker.    [id number] score of 5 for age 65-74, female gender, cardiomyopathy, hypertension, coronary artery disease    Tachycardia-mediated cardiomyopathy, HFrEF: Significant improvement in LVEF following initiation of GDMT and atrial fibrillation ablation, now 45-50% by TTE 10/23/2023.  Coronary CTA without significant CAD. On ARB, furosemide     Hypertension: Controlled on current regimen    Plan:   -Continue Eliquis 5 mg twice a day for stroke prophylaxis  -Continue metoprolol succinate 50 mg daily  -2-week JONAS  -Follow-up pending results of above     History of Present Illness/Subjective    Verena Lagunas is a 68 year old female with past medical history significant for persistent AF, tachycardia mediated cardiomyopathy, mild CAD, essential hypertension, hyperlipidemia, prior nicotine use, COPD, Meniere's disease, hearing impairment. She underwent catheter ablation 8/21/2023 including pulmonary  vein isolation and right atrial CTI flutter.  She had improved shortness of breath with perhaps mildly worsening pleuritic chest discomfort and intermittent sensation of pounding heart following ablation without sustained tachypalpitations.  Due to worsening dizziness and balance her amiodarone was discontinued 10/2/2023.  She underwent TTE 10/23 showing significant improvement in LVEF and coronary CTA showing mild stenosis to proximal LAD without further disease.  She remains on metoprolol 50 mg daily.    She has occasional transient sensation of pounding heart and chest tightness. Over the past several weeks she had intermittent shortness of breath with exertion, without improvement following use of albuterol inhaler.  She has intermittent lightheadedness/dizziness in particular with bending down and mild pedal edema which are stable. Her balance is improved.  She denies syncope.     Data Review     Arrhythmia hx:   Dx/date: progressive dyspnea 2023, persistent AFRVR 2023  Sx: Dyspnea, tachypalpitations, dizziness, chest discomfort   [id number]/OAC: 4 (age 65-74, female gender, cardiomyopathy and hypertension); apixaban   Prior / current AV steve blocking agents, AAD: amiodarone, metoprolol  DCCV: 2023  Ablation: 2023, Dr. Briscoe-pulmonary vein isolation, CTI ablation     EK2023: SB 59 bpm, occasional ventricular ectopy, T wave inversion to inferorolateral leads c/w prior, QRS 96 ms, QT/QTc 434/429 ms  2023: Sinus rhythm 69 bpm, occasional atrial ectopy, T wave inversion to inferolateral leads,  ms, QT/QTc 402/430 ms  2023: AF with  bpm, QRS 90 ms, QT/QTc 324/493 ms  Personally reviewed.      TTE:   10/23/2023  The left ventricle is normal in size.  The visual ejection fraction is 45-50%.  There is mild-moderate global hypokinesia of the left ventricle.  Normal right ventricle size and systolic function.  The left atrium is severely dilated.  The mitral valve  leaflets are moderately thickened.  There is mild mitral stenosis.  Heart rate 90 bpm  The right ventricular systolic pressure is approximated at 27mmHg plus the  right atrial pressure.  Sinus rhythm was noted.  Compared to prior study 6/7/23 the LVEF has improved and A-Fib has resolved.    6/7/2023  The left ventricle is mildly dilated.  Left ventricular function is decreased. The ejection fraction is 20-25%  (severely reduced).  Left ventricular diastolic function is abnormal.  There is severe global hypokinesia of the left ventricle.  The right ventricle is mildly dilated. Moderately decreased right ventricular  systolic function  The left atrium is severely dilated. The right atrium is moderately dilated.  Mild mitral valve prolapse, bileaflet. There is mild to moderate (1-2+) mitral  regurgitation.  Right ventricular systolic pressure is elevated, consistent with mild  pulmonary hypertension.    Coronary CTA: 11/15/2023    Calcium Scoring: The total Agatston score is 34. A calcium score in this range places the individual in the 50th percentile when compared to an age and gender matched control group and implies a moderate risk of cardiac events in the next ten years.    Right dominant coronary artery system    Mild calcfied stenosis in the proximal left anterior descending artery.  The remainder of the vessels appears patent.    I have reviewed and updated the patient's past medical history, allergy list and medication list.          Physical Examination   Vitals: /66 (BP Location: Right arm, Patient Position: Sitting, Cuff Size: Adult Regular)   Pulse 94   Resp 18   Wt 61.7 kg (136 lb)   LMP  (LMP Unknown)   SpO2 96%   BMI 21.95 kg/m      BMI= Body mass index is 21.95 kg/m .    Wt Readings from Last 3 Encounters:   12/04/23 61.7 kg (136 lb)   10/02/23 55.8 kg (123 lb)   08/29/23 54 kg (119 lb)       General   Appearance:   Alert and oriented, in no acute distress.    HEENT:  Normocephalic and  atraumatic. Conjunctiva and sclera are clear. Moist oral mucosa.    Neck: No JVP, carotid bruit or obvious thyromegaly.   Lungs:   Respirations unlabored. Clear bilaterally with no rales, rhonchi, or wheezes.     Cardiovascular:   Rhythm is regular, rate is elevated. S1 and S2 are normal. No significant murmur is present. Lower extremities demonstrate no mild edema. Posterior tibial pulses are intact bilaterally.   Extremities: No cyanosis or clubbing   Skin: Skin is warm, dry, and otherwise intact.   Neurologic: Gait not assessed. Mood and affect appropriate.           Medical History  Surgical History Family History Social History   Past Medical History:   Diagnosis Date    Allergic rhinitis     Hypertension     Tobacco abuse     Past Surgical History:   Procedure Laterality Date    EP ABLATION PULMONARY VEIN ISOLATION N/A 2023    Procedure: Ablation Atrial Fibrillation;  Surgeon: Vandana Briscoe MD;  Location: [facility] CV    OVARIAN CYST REMOVAL Right     Family History   Problem Relation Age of Onset    Colon Cancer Mother 79            Lung Cancer Father 50            No Known Problems Sister     Other - See Comments Brother         6 brothers and 1 sister    Cancer Brother     Breast Cancer Maternal Aunt 75    Social History     Socioeconomic History    Marital status:      Spouse name: Not on file    Number of children: Not on file    Years of education: Not on file    Highest education level: Not on file   Occupational History    Not on file   Tobacco Use    Smoking status: Former     Packs/day: .5     Types: Cigarettes     Quit date: 2023     Years since quittin.5     Passive exposure: Never    Smokeless tobacco: Never   Vaping Use    Vaping Use: Never used   Substance and Sexual Activity    Alcohol use: No     Comment: Alcoholic Drinks/day: rare    Drug use: No    Sexual activity: Not on file   Other Topics Concern    Not on file   Social History  Alfredo    [place]  Mynor.  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  No children.       Social Determinants of Health     Financial Resource Strain: Not on file   Food Insecurity: Not on file   Transportation Needs: Not on file   Physical Activity: Not on file   Stress: Not on file   Social Connections: Not on file   Interpersonal Safety: Not on file   Housing Stability: Not on file          Medications  Allergies   Scheduled Meds:  Current Outpatient Medications   Medication Sig Dispense Refill    albuterol (PROAIR HFA/PROVENTIL HFA/VENTOLIN HFA) 108 (90 Base) MCG/ACT inhaler Inhale 2 puffs into the lungs every 6 hours as needed for shortness of breath, wheezing or cough 18 g 11    apixaban ANTICOAGULANT (ELIQUIS ANTICOAGULANT) 5 MG tablet Take 1 tablet (5 mg) by mouth 2 times daily 180 tablet 3    atorvastatin (LIPITOR) 20 MG tablet Take 1 tablet (20 mg) by mouth daily 90 tablet 3    cholecalciferol, vitamin D3, (VITAMIN D3) 1,000 unit capsule [CHOLECALCIFEROL, VITAMIN D3, (VITAMIN D3) 1,000 UNIT CAPSULE] Take 1 capsule (1,000 Units total) by mouth daily.  0    colchicine (COLCRYS) 0.6 MG tablet Take 1 tablet (0.6 mg) by mouth 2 times daily 20 tablet 0    furosemide (LASIX) 20 MG tablet Take 1 tablet (20 mg) by mouth daily 90 tablet 1    losartan (COZAAR) 50 MG tablet Take 1 tablet (50 mg) by mouth daily 90 tablet 4    metoprolol succinate ER (TOPROL XL) 50 MG 24 hr tablet Take 1 tablet (50 mg) by mouth daily 90 tablet 3    Allergies   Allergen Reactions    Cat Hair Std Allergenic Ext [Cat Hair Extract] Unknown    Penicillins Shortness Of Breath         Lab Results    Chemistry/lipid CBC Cardiac Enzymes/BNP/TSH/INR   Lab Results   Component Value Date    CHOL 171 10/04/2022    HDL 81 10/04/2022    TRIG 65 10/04/2022    BUN 15.0 10/02/2023     10/02/2023    CO2 31 (H)  10/02/2023    Lab Results   Component Value Date    WBC 7.4 10/02/2023    HGB 13.7 10/02/2023    HCT 40.8 10/02/2023    MCV 88 10/02/2023     10/02/2023    @RESUFAST(BMP,CBC,BNP,TSH,  INR)@      40 minutes spent reviewing prior records (including documentation, laboratory studies, cardiac testing/imaging), history and physical exam, planning, and subsequent documentation.     This note has been dictated using voice recognition software. Any grammatical, typographical, or context distortions are unintentional and inherent to the software.    Carmen Plaza, CNP  Clinical Cardiac Electrophysiology  [facility]  Clinic and [phone]  Fax: [phone]  Electrophysiology Nurses: [phone]

## 2023-12-04 ENCOUNTER — OFFICE VISIT (OUTPATIENT)
Dept: CARDIOLOGY | Facility: CLINIC | Age: 68
End: 2023-12-04
Attending: NURSE PRACTITIONER
Payer: COMMERCIAL

## 2023-12-04 VITALS
WEIGHT: 136 LBS | SYSTOLIC BLOOD PRESSURE: 128 MMHG | RESPIRATION RATE: 18 BRPM | HEART RATE: 94 BPM | DIASTOLIC BLOOD PRESSURE: 66 MMHG | OXYGEN SATURATION: 96 % | BODY MASS INDEX: 21.95 KG/M2

## 2023-12-04 DIAGNOSIS — I48.19 PERSISTENT ATRIAL FIBRILLATION (H): Primary | ICD-10-CM

## 2023-12-04 DIAGNOSIS — I10 ESSENTIAL HYPERTENSION: ICD-10-CM

## 2023-12-04 DIAGNOSIS — I42.8 NONISCHEMIC CARDIOMYOPATHY (H): ICD-10-CM

## 2023-12-04 DIAGNOSIS — Z98.890 STATUS POST CATHETER ABLATION OF ATRIAL FIBRILLATION: ICD-10-CM

## 2023-12-04 PROCEDURE — 99215 OFFICE O/P EST HI 40 MIN: CPT | Performed by: NURSE PRACTITIONER

## 2023-12-04 NOTE — LETTER
12/4/2023    Tomasz Beltran MD  [address] [address]    RE: Verena Holcombelle       Dear Colleague,     I had the pleasure of seeing Verena Lagunas in the [facility].     [facility]  Cardiac Electrophysiology  [address]  [place] [address]   Office: [phone]  Fax: [phone]     HEART CARE ELECTROPHYSIOLOGY FOLLOW UP    Primary Care: Tomasz Beltran MD      Assessment/Recommendations     Persistent atrial fibrillation, atrial flutter: Status post atrial fibrillation and CTI ablation 8/21/2023.  Amiodarone discontinued 10/2/2023 with improvement in balance.  She continues to have intermittent palpitations, chest discomfort and shortness of breath with exertion. No documentation of recurrent atrial fibrillation following ablation.  We discussed symptomatology may represent paroxysmal AF, alternative atrial arrhythmias or atrial or ventricular ectopy, the latter being most likely.  She has a history of prior nicotine use and chronic bronchitis likely also contributing to shortness of breath and chest tightness.  Plan to continue beta-blocker therapy and undergo JONAS.  Pending JONAS results could consider taper and discontinuation of beta-blocker.    [id number] score of 5 for age 65-74, female gender, cardiomyopathy, hypertension, coronary artery disease    Tachycardia-mediated cardiomyopathy, HFrEF: Significant improvement in LVEF following initiation of GDMT and atrial fibrillation ablation, now 45-50% by TTE 10/23/2023.  Coronary CTA without significant CAD. On ARB, furosemide     Hypertension: Controlled on current regimen    Plan:   -Continue Eliquis 5 mg twice a day for stroke prophylaxis  -Continue metoprolol succinate 50 mg daily  -2-week JONAS  -Follow-up pending results of above     History of Present Illness/Subjective    Verena Lagunas is a 68 year old female with past medical history significant for persistent AF,  tachycardia mediated cardiomyopathy, mild CAD, essential hypertension, hyperlipidemia, prior nicotine use, COPD, Meniere's disease, hearing impairment. She underwent catheter ablation 2023 including pulmonary vein isolation and right atrial CTI flutter.  She had improved shortness of breath with perhaps mildly worsening pleuritic chest discomfort and intermittent sensation of pounding heart following ablation without sustained tachypalpitations.  Due to worsening dizziness and balance her amiodarone was discontinued 10/2/2023.  She underwent TTE 10/23 showing significant improvement in LVEF and coronary CTA showing mild stenosis to proximal LAD without further disease.  She remains on metoprolol 50 mg daily.    She has occasional transient sensation of pounding heart and chest tightness. Over the past several weeks she had intermittent shortness of breath with exertion, without improvement following use of albuterol inhaler.  She has intermittent lightheadedness/dizziness in particular with bending down and mild pedal edema which are stable. Her balance is improved.  She denies syncope.     Data Review     Arrhythmia hx:   Dx/date: progressive dyspnea 2023, persistent AFRVR 2023  Sx: Dyspnea, tachypalpitations, dizziness, chest discomfort   [id number]/OAC: 4 (age 65-74, female gender, cardiomyopathy and hypertension); apixaban   Prior / current AV steve blocking agents, AAD: amiodarone, metoprolol  DCCV: 2023  Ablation: 2023, Dr. Briscoe-pulmonary vein isolation, CTI ablation     EK2023: SB 59 bpm, occasional ventricular ectopy, T wave inversion to inferorolateral leads c/w prior, QRS 96 ms, QT/QTc 434/429 ms  2023: Sinus rhythm 69 bpm, occasional atrial ectopy, T wave inversion to inferolateral leads,  ms, QT/QTc 402/430 ms  2023: AF with  bpm, QRS 90 ms, QT/QTc 324/493 ms  Personally reviewed.      TTE:   10/23/2023  The left ventricle is normal in size.  The  visual ejection fraction is 45-50%.  There is mild-moderate global hypokinesia of the left ventricle.  Normal right ventricle size and systolic function.  The left atrium is severely dilated.  The mitral valve leaflets are moderately thickened.  There is mild mitral stenosis.  Heart rate 90 bpm  The right ventricular systolic pressure is approximated at 27mmHg plus the  right atrial pressure.  Sinus rhythm was noted.  Compared to prior study 6/7/23 the LVEF has improved and A-Fib has resolved.    6/7/2023  The left ventricle is mildly dilated.  Left ventricular function is decreased. The ejection fraction is 20-25%  (severely reduced).  Left ventricular diastolic function is abnormal.  There is severe global hypokinesia of the left ventricle.  The right ventricle is mildly dilated. Moderately decreased right ventricular  systolic function  The left atrium is severely dilated. The right atrium is moderately dilated.  Mild mitral valve prolapse, bileaflet. There is mild to moderate (1-2+) mitral  regurgitation.  Right ventricular systolic pressure is elevated, consistent with mild  pulmonary hypertension.    Coronary CTA: 11/15/2023    Calcium Scoring: The total Agatston score is 34. A calcium score in this range places the individual in the 50th percentile when compared to an age and gender matched control group and implies a moderate risk of cardiac events in the next ten years.    Right dominant coronary artery system    Mild calcfied stenosis in the proximal left anterior descending artery.  The remainder of the vessels appears patent.    I have reviewed and updated the patient's past medical history, allergy list and medication list.          Physical Examination   Vitals: /66 (BP Location: Right arm, Patient Position: Sitting, Cuff Size: Adult Regular)   Pulse 94   Resp 18   Wt 61.7 kg (136 lb)   LMP  (LMP Unknown)   SpO2 96%   BMI 21.95 kg/m      BMI= Body mass index is 21.95 kg/m .    Wt Readings  from Last 3 Encounters:   23 61.7 kg (136 lb)   10/02/23 55.8 kg (123 lb)   23 54 kg (119 lb)       General   Appearance:   Alert and oriented, in no acute distress.    HEENT:  Normocephalic and atraumatic. Conjunctiva and sclera are clear. Moist oral mucosa.    Neck: No JVP, carotid bruit or obvious thyromegaly.   Lungs:   Respirations unlabored. Clear bilaterally with no rales, rhonchi, or wheezes.     Cardiovascular:   Rhythm is regular, rate is elevated. S1 and S2 are normal. No significant murmur is present. Lower extremities demonstrate no mild edema. Posterior tibial pulses are intact bilaterally.   Extremities: No cyanosis or clubbing   Skin: Skin is warm, dry, and otherwise intact.   Neurologic: Gait not assessed. Mood and affect appropriate.           Medical History  Surgical History Family History Social History   Past Medical History:   Diagnosis Date    Allergic rhinitis     Hypertension     Tobacco abuse     Past Surgical History:   Procedure Laterality Date    EP ABLATION PULMONARY VEIN ISOLATION N/A 2023    Procedure: Ablation Atrial Fibrillation;  Surgeon: Vandana Briscoe MD;  Location: [facility] CV    OVARIAN CYST REMOVAL Right     Family History   Problem Relation Age of Onset    Colon Cancer Mother 79            Lung Cancer Father 50            No Known Problems Sister     Other - See Comments Brother         6 brothers and 1 sister    Cancer Brother     Breast Cancer Maternal Aunt 75    Social History     Socioeconomic History    Marital status:      Spouse name: Not on file    Number of children: Not on file    Years of education: Not on file    Highest education level: Not on file   Occupational History    Not on file   Tobacco Use    Smoking status: Former     Packs/day: .5     Types: Cigarettes     Quit date: 2023     Years since quittin.5     Passive exposure: Never    Smokeless tobacco: Never   Vaping Use    Vaping Use:  Never used   Substance and Sexual Activity    Alcohol use: No     Comment: Alcoholic Drinks/day: rare    Drug use: No    Sexual activity: Not on file   Other Topics Concern    Not on file   Social History Narrative    [place]  Mynor.  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  No children.       Social Determinants of Health     Financial Resource Strain: Not on file   Food Insecurity: Not on file   Transportation Needs: Not on file   Physical Activity: Not on file   Stress: Not on file   Social Connections: Not on file   Interpersonal Safety: Not on file   Housing Stability: Not on file          Medications  Allergies   Scheduled Meds:  Current Outpatient Medications   Medication Sig Dispense Refill    albuterol (PROAIR HFA/PROVENTIL HFA/VENTOLIN HFA) 108 (90 Base) MCG/ACT inhaler Inhale 2 puffs into the lungs every 6 hours as needed for shortness of breath, wheezing or cough 18 g 11    apixaban ANTICOAGULANT (ELIQUIS ANTICOAGULANT) 5 MG tablet Take 1 tablet (5 mg) by mouth 2 times daily 180 tablet 3    atorvastatin (LIPITOR) 20 MG tablet Take 1 tablet (20 mg) by mouth daily 90 tablet 3    cholecalciferol, vitamin D3, (VITAMIN D3) 1,000 unit capsule [CHOLECALCIFEROL, VITAMIN D3, (VITAMIN D3) 1,000 UNIT CAPSULE] Take 1 capsule (1,000 Units total) by mouth daily.  0    colchicine (COLCRYS) 0.6 MG tablet Take 1 tablet (0.6 mg) by mouth 2 times daily 20 tablet 0    furosemide (LASIX) 20 MG tablet Take 1 tablet (20 mg) by mouth daily 90 tablet 1    losartan (COZAAR) 50 MG tablet Take 1 tablet (50 mg) by mouth daily 90 tablet 4    metoprolol succinate ER (TOPROL XL) 50 MG 24 hr tablet Take 1 tablet (50 mg) by mouth daily 90 tablet 3    Allergies   Allergen Reactions    Cat Hair Std Allergenic Ext [Cat Hair Extract] Unknown    Penicillins Shortness Of Breath         Lab Results     Chemistry/lipid CBC Cardiac Enzymes/BNP/TSH/INR   Lab Results   Component Value Date    CHOL 171 10/04/2022    HDL 81 10/04/2022    TRIG 65 10/04/2022    BUN 15.0 10/02/2023     10/02/2023    CO2 31 (H) 10/02/2023    Lab Results   Component Value Date    WBC 7.4 10/02/2023    HGB 13.7 10/02/2023    HCT 40.8 10/02/2023    MCV 88 10/02/2023     10/02/2023    @RESUFAST(BMP,CBC,BNP,TSH,  INR)@      40 minutes spent reviewing prior records (including documentation, laboratory studies, cardiac testing/imaging), history and physical exam, planning, and subsequent documentation.     This note has been dictated using voice recognition software. Any grammatical, typographical, or context distortions are unintentional and inherent to the software.    Carmen Plaza CNP  Clinical Cardiac Electrophysiology  [facility]  Clinic and [phone]  Fax: [phone]  Electrophysiology Nurses: [phone]    Thank you for allowing me to participate in the care of your patient.      Sincerely,   SARAH LE CNP   [facility]  cc:   SARAH Dodd CNP  [facility] [address]  [address] [place] [address]

## 2023-12-17 DIAGNOSIS — I50.21 ACUTE SYSTOLIC CONGESTIVE HEART FAILURE (H): ICD-10-CM

## 2023-12-18 RX ORDER — FUROSEMIDE 20 MG
20 TABLET ORAL DAILY
Qty: 90 TABLET | Refills: 1 | Status: SHIPPED | OUTPATIENT
Start: 2023-12-18 | End: 2024-03-18

## 2023-12-27 ENCOUNTER — HOSPITAL ENCOUNTER (OUTPATIENT)
Dept: CARDIOLOGY | Facility: HOSPITAL | Age: 68
Discharge: HOME | End: 2023-12-27
Attending: NURSE PRACTITIONER | Admitting: NURSE PRACTITIONER
Payer: COMMERCIAL

## 2023-12-27 DIAGNOSIS — I48.19 PERSISTENT ATRIAL FIBRILLATION (H): ICD-10-CM

## 2023-12-27 DIAGNOSIS — Z98.890 STATUS POST CATHETER ABLATION OF ATRIAL FIBRILLATION: ICD-10-CM

## 2023-12-27 PROCEDURE — T1013 SIGN LANG/ORAL INTERPRETER: HCPCS | Mod: U3

## 2023-12-27 PROCEDURE — 93270 REMOTE 30 DAY ECG REV/REPORT: CPT

## 2024-01-12 PROCEDURE — 93272 ECG/REVIEW INTERPRET ONLY: CPT | Performed by: INTERNAL MEDICINE

## 2024-01-19 ENCOUNTER — TELEPHONE (OUTPATIENT)
Dept: CARDIOLOGY | Facility: CLINIC | Age: 69
End: 2024-01-19
Payer: COMMERCIAL

## 2024-01-19 NOTE — TELEPHONE ENCOUNTER
",  Please see patient update below.  She continues having intermittent symptoms associated with a runny nose.  Encouraged close follow up with her PCP for evaluation of possible viral symptoms.   Event monitor results are in Epic 12/27/23.  Do you have any new orders or recommendations?  Thank you,  Hansa        Via :    Patient reports ongoing, intermittent symptoms of fatigue ,hard time sleeping, \"catching my breath\" and exhaustion. \"The air - can't hold it in my lungs, my back is clenching like I a have a cold and a runny nose\". Using inhaler without relief. Symptoms for 1 to 2 months, intermittently.   Reviewed event monitor results and OV 12/4/2023.  Assured patient an update will be forwarded to Dr. Nance and she will only be contacted if new recommendations are offered.              "

## 2024-01-22 NOTE — TELEPHONE ENCOUNTER
Noted. No call needed at this time.  ---------------------------------------  ----- Message -----   From: Reynaldo Nance MD   Sent: 1/19/2024   4:27 PM CST   To: Hansa Guerrero RN     Agree with recommendation to follow-up with primary provider and we can go from there.  Thanks.

## 2024-01-31 DIAGNOSIS — E78.2 MIXED HYPERLIPIDEMIA: ICD-10-CM

## 2024-01-31 DIAGNOSIS — I10 ESSENTIAL HYPERTENSION: ICD-10-CM

## 2024-01-31 RX ORDER — LOSARTAN POTASSIUM 50 MG/1
50 TABLET ORAL DAILY
Qty: 90 TABLET | Refills: 1 | Status: SHIPPED | OUTPATIENT
Start: 2024-01-31 | End: 2024-07-29

## 2024-01-31 RX ORDER — ATORVASTATIN CALCIUM 20 MG/1
20 TABLET, FILM COATED ORAL DAILY
Qty: 90 TABLET | Refills: 3 | Status: SHIPPED | OUTPATIENT
Start: 2024-01-31

## 2024-02-08 ENCOUNTER — MYC MEDICAL ADVICE (OUTPATIENT)
Dept: INTERNAL MEDICINE | Facility: CLINIC | Age: 69
End: 2024-02-08
Payer: COMMERCIAL

## 2024-02-08 NOTE — TELEPHONE ENCOUNTER
Patient called in to the clinic and call transferred to my direct line.  Patient's dentist has requested she hold apixaban for 3 days before the extraction.   Per MyChart 12/8/2023 from Dr. Nance:Suspect if she is having significant dental work they will want to have this held for approximately 3 days which would be fine.     Patient verbalized understanding and has no further questions at this time. Instructed to call with any other questions.

## 2024-02-08 NOTE — TELEPHONE ENCOUNTER
Patient left message with her number to call back to discuss a medication.  Called back and left detailed message via  with my direct number to call back.

## 2024-03-02 ENCOUNTER — HEALTH MAINTENANCE LETTER (OUTPATIENT)
Age: 69
End: 2024-03-02

## 2024-03-16 SDOH — HEALTH STABILITY: PHYSICAL HEALTH: ON AVERAGE, HOW MANY MINUTES DO YOU ENGAGE IN EXERCISE AT THIS LEVEL?: 0 MIN

## 2024-03-16 SDOH — HEALTH STABILITY: PHYSICAL HEALTH: ON AVERAGE, HOW MANY DAYS PER WEEK DO YOU ENGAGE IN MODERATE TO STRENUOUS EXERCISE (LIKE A BRISK WALK)?: 0 DAYS

## 2024-03-16 ASSESSMENT — SOCIAL DETERMINANTS OF HEALTH (SDOH): HOW OFTEN DO YOU GET TOGETHER WITH FRIENDS OR RELATIVES?: PATIENT DECLINED

## 2024-03-16 NOTE — COMMUNITY RESOURCES LIST (ENGLISH)
March 16, 2024           YOUR PERSONALIZED LIST OF SERVICES & PROGRAMS           & SHELTER    Housing      Free - Client Services  [address] [place] [address] (Distance: 2.4 miles)  Phone: [phone]  Website: https://Trackway.[employer]/  Language: English  Fee: Free  Transportation Options: Free transportation      [facility] - Housing Stabilization Services  Phone: [phone]) 722-9469  Website: https://[employer]/Housing-Stabilization.html  Language: English  Hours: Mon 9:00 AM - 5:00 PM Tue 9:00 AM - 5:00 PM Wed 9:00 AM - 5:00 PM Thu 9:00 AM - 5:00 PM Fri 9:00 AM - 5:00 PM  Fee: Insurance  Accessibility: Deaf or hard of hearing, Translation services      [facility]  Phone: [phone]  Website: https://www.[employer].org/  Language: English    Case Management      [employer] [facility] - Housing Stabilization Services  [address] [place] [address] (Distance: 5.4 miles)  Phone: [phone]  Website: https://www.opportunityBlack Hammer Brewing.org/  Language: Occitan, English, Georgian, Hmong, Barbadian, Macanese  Fee: Insurance  Accessibility: Ada accessible, Blind accommodation, Deaf or hard of hearing, Translation services  Transportation Options: Free transportation      Living - Housing Stabilization Services  [address] [place] [address] (Distance: 9.5 miles)  Phone: [phone]  Website: https://az[employer] Universe  Language: Occitan, English, Barbadian  Fee: Insurance, Self pay      [employer] - Housing Stabilization Services  Phone: [phone] [phone]  Website: https://homebasemn.com/  Language: English  Hours: Mon 8:00 AM - 4:00 PM Tue 8:00 AM - 4:00 PM Wed 8:00 AM - 4:00 PM Thu 8:00 AM - 4:00 PM Fri 8:00 AM - 4:00 PM  Fee: Free  Accessibility: Blind accommodation, Deaf or hard of hearing  Transportation Options: Free transportation    Drop-In Services      [facility] [place] [facility]  Living Program  [address] [place] [address] (Distance: 6.5 miles)  Website: https://www.ucamn.info/elders  Language: English      [facility] - Warming or cooling center - [place] [facility] - [facility]  [address] [place] [address] (Distance: 0.5 miles)  Phone: [phone]) [phone]  Language: English, Tamazight, Swiss  Fee: Free  Accessibility: Ada accessible, Translation services      LOVE - LAUNDRY LOVE  Website: http://www.laundrylove.org        & RECREATION    Sports      [facility] - Sports clubs and recreational activities  [address] [place] [address] (Distance: 0.8 miles)  Language: English, Tamazight, Hmong  Fee: Free      [facility] - [employer]  [address] [place] [address] (Distance: 1.2 miles)  Language: English, Hmong  Fee: Free  Accessibility: Translation services      [facility] - Adult Enrichment  Phone: [phone]  Website: https://ePetWorld/adults-seniors/adult-enrichment/  Language: English  Hours: Mon 7:30 AM - 4:00 PM Tue 7:30 AM - 4:00 PM Wed 7:30 AM - 4:00 PM Thu 7:30 AM - 4:00 PM Fri 7:30 AM - 4:00 PM    Classes/Groups      [facility] - Group fitness classes  [address] [place] [address] (Distance: 0.6 miles)  Phone: [phone]  Language: English, Tamazight, Hmong  Fee: Free  Accessibility: Translation services      of [place] - Group fitness classes - [place] - [place] [facility] [place]  [address] [place] [address] (Distance: 0.9 miles)  Phone: [phone]  Language: English, Tamazight  Fee: Free, Self pay  Accessibility: Ada accessible      - Online and Local Fitness Classes  Phone: [phone]  Website: https://tools.[employer]/Search/OnlineClasses  Language: English  Fee: Free               IMPORTANT NUMBERS & WEBSITES        Emergency Services  911  .   [facility]  211 http://211unAugusta University Medical Center.org  .    Poison Control  (632) 649-3075 http://[place]poison.org http://wisconsinpoison.org  .     Suicide and Crisis Lifeline  988 http://988lifeline.org  .   Childhelp [place] Child Abuse Hotline  454.288.5373 http://Childhelphotline.org   .   [place] Sexual Assault Hotline  (381) 655-1161 (HOPE) http://ClickDeliveryn.org   .     [place] Runaway Safeline  (182) 240-6913 (RUNAWAY) http://JumpCloud.Medic Trace  .   Pregnancy & Postpartum Support  Call/text 506-046-7625  [place]: http://ppsupportmn.org  [place]: http://AuthorityLabs.com/wi  .   Substance Abuse National Helpline ([facility])  247-808-HELP (7870) http://Findtreatment.gov   .                DISCLAIMER: [employer] does not endorse any service providers mentioned in this resource list. [employer] does not guarantee that the services mentioned in this resource list will be available to you or will improve your health or wellness.    [facility]

## 2024-03-18 ENCOUNTER — OFFICE VISIT (OUTPATIENT)
Dept: INTERNAL MEDICINE | Facility: CLINIC | Age: 69
End: 2024-03-18
Payer: COMMERCIAL

## 2024-03-18 VITALS
DIASTOLIC BLOOD PRESSURE: 62 MMHG | HEIGHT: 65 IN | RESPIRATION RATE: 21 BRPM | WEIGHT: 151.5 LBS | BODY MASS INDEX: 25.24 KG/M2 | SYSTOLIC BLOOD PRESSURE: 120 MMHG | HEART RATE: 103 BPM | OXYGEN SATURATION: 97 % | TEMPERATURE: 98.9 F

## 2024-03-18 DIAGNOSIS — H91.93 BILATERAL DEAFNESS: ICD-10-CM

## 2024-03-18 DIAGNOSIS — E55.9 VITAMIN D DEFICIENCY: ICD-10-CM

## 2024-03-18 DIAGNOSIS — I48.19 PERSISTENT ATRIAL FIBRILLATION (H): ICD-10-CM

## 2024-03-18 DIAGNOSIS — I10 ESSENTIAL HYPERTENSION: ICD-10-CM

## 2024-03-18 DIAGNOSIS — J41.0 SIMPLE CHRONIC BRONCHITIS (H): ICD-10-CM

## 2024-03-18 DIAGNOSIS — I50.22 CHRONIC SYSTOLIC HEART FAILURE (H): ICD-10-CM

## 2024-03-18 DIAGNOSIS — R91.8 LUNG NODULES: ICD-10-CM

## 2024-03-18 DIAGNOSIS — K21.9 GASTROESOPHAGEAL REFLUX DISEASE WITHOUT ESOPHAGITIS: ICD-10-CM

## 2024-03-18 DIAGNOSIS — Z12.31 VISIT FOR SCREENING MAMMOGRAM: ICD-10-CM

## 2024-03-18 DIAGNOSIS — M81.0 AGE-RELATED OSTEOPOROSIS WITHOUT CURRENT PATHOLOGICAL FRACTURE: ICD-10-CM

## 2024-03-18 DIAGNOSIS — Z87.891 PERSONAL HISTORY OF NICOTINE DEPENDENCE: ICD-10-CM

## 2024-03-18 DIAGNOSIS — E78.2 MIXED HYPERLIPIDEMIA: ICD-10-CM

## 2024-03-18 DIAGNOSIS — Z12.11 SCREEN FOR COLON CANCER: ICD-10-CM

## 2024-03-18 DIAGNOSIS — Z00.00 ANNUAL PHYSICAL EXAM: Primary | ICD-10-CM

## 2024-03-18 PROBLEM — F17.219 CIGARETTE NICOTINE DEPENDENCE WITH NICOTINE-INDUCED DISORDER: Status: RESOLVED | Noted: 2021-02-05 | Resolved: 2024-03-18

## 2024-03-18 PROBLEM — I50.21 ACUTE SYSTOLIC CONGESTIVE HEART FAILURE (H): Status: RESOLVED | Noted: 2023-06-23 | Resolved: 2024-03-18

## 2024-03-18 PROBLEM — Z98.890 STATUS POST CATHETER ABLATION OF ATRIAL FIBRILLATION: Status: RESOLVED | Noted: 2023-10-02 | Resolved: 2024-03-18

## 2024-03-18 LAB
ALBUMIN SERPL BCG-MCNC: 4.3 G/DL (ref 3.5–5.2)
ALP SERPL-CCNC: 78 U/L (ref 40–150)
ALT SERPL W P-5'-P-CCNC: 30 U/L (ref 0–50)
ANION GAP SERPL CALCULATED.3IONS-SCNC: 10 MMOL/L (ref 7–15)
AST SERPL W P-5'-P-CCNC: 38 U/L (ref 0–45)
ATRIAL RATE - MUSE: 95 BPM
BILIRUB SERPL-MCNC: 0.3 MG/DL
BUN SERPL-MCNC: 16.5 MG/DL (ref 8–23)
CALCIUM SERPL-MCNC: 9.6 MG/DL (ref 8.8–10.2)
CHLORIDE SERPL-SCNC: 102 MMOL/L (ref 98–107)
CHOLEST SERPL-MCNC: 189 MG/DL
CREAT SERPL-MCNC: 0.85 MG/DL (ref 0.51–0.95)
DEPRECATED HCO3 PLAS-SCNC: 27 MMOL/L (ref 22–29)
DIASTOLIC BLOOD PRESSURE - MUSE: NORMAL MMHG
EGFRCR SERPLBLD CKD-EPI 2021: 73 ML/MIN/1.73M2
ERYTHROCYTE [DISTWIDTH] IN BLOOD BY AUTOMATED COUNT: 12.1 % (ref 10–15)
FASTING STATUS PATIENT QL REPORTED: NORMAL
GLUCOSE SERPL-MCNC: 95 MG/DL (ref 70–99)
HCT VFR BLD AUTO: 39.6 % (ref 35–47)
HDLC SERPL-MCNC: 86 MG/DL
HGB BLD-MCNC: 12.7 G/DL (ref 11.7–15.7)
INTERPRETATION ECG - MUSE: NORMAL
LDLC SERPL CALC-MCNC: 87 MG/DL
MCH RBC QN AUTO: 28.3 PG (ref 26.5–33)
MCHC RBC AUTO-ENTMCNC: 32.1 G/DL (ref 31.5–36.5)
MCV RBC AUTO: 88 FL (ref 78–100)
NONHDLC SERPL-MCNC: 103 MG/DL
NT-PROBNP SERPL-MCNC: 854 PG/ML (ref 0–900)
P AXIS - MUSE: 72 DEGREES
PLATELET # BLD AUTO: 249 10E3/UL (ref 150–450)
POTASSIUM SERPL-SCNC: 4.2 MMOL/L (ref 3.4–5.3)
PR INTERVAL - MUSE: 146 MS
PROT SERPL-MCNC: 7 G/DL (ref 6.4–8.3)
QRS DURATION - MUSE: 84 MS
QT - MUSE: 354 MS
QTC - MUSE: 444 MS
R AXIS - MUSE: 79 DEGREES
RBC # BLD AUTO: 4.48 10E6/UL (ref 3.8–5.2)
SODIUM SERPL-SCNC: 139 MMOL/L (ref 135–145)
SYSTOLIC BLOOD PRESSURE - MUSE: NORMAL MMHG
T AXIS - MUSE: 77 DEGREES
TRIGL SERPL-MCNC: 81 MG/DL
TSH SERPL DL<=0.005 MIU/L-ACNC: 2.48 UIU/ML (ref 0.3–4.2)
VENTRICULAR RATE- MUSE: 95 BPM
WBC # BLD AUTO: 8.7 10E3/UL (ref 4–11)

## 2024-03-18 PROCEDURE — 80061 LIPID PANEL: CPT | Performed by: INTERNAL MEDICINE

## 2024-03-18 PROCEDURE — 93005 ELECTROCARDIOGRAM TRACING: CPT | Performed by: INTERNAL MEDICINE

## 2024-03-18 PROCEDURE — 90480 ADMN SARSCOV2 VAC 1/ONLY CMP: CPT | Performed by: INTERNAL MEDICINE

## 2024-03-18 PROCEDURE — 80053 COMPREHEN METABOLIC PANEL: CPT | Performed by: INTERNAL MEDICINE

## 2024-03-18 PROCEDURE — 85027 COMPLETE CBC AUTOMATED: CPT | Performed by: INTERNAL MEDICINE

## 2024-03-18 PROCEDURE — 36415 COLL VENOUS BLD VENIPUNCTURE: CPT | Performed by: INTERNAL MEDICINE

## 2024-03-18 PROCEDURE — 84443 ASSAY THYROID STIM HORMONE: CPT | Performed by: INTERNAL MEDICINE

## 2024-03-18 PROCEDURE — 91320 SARSCV2 VAC 30MCG TRS-SUC IM: CPT | Performed by: INTERNAL MEDICINE

## 2024-03-18 PROCEDURE — 83880 ASSAY OF NATRIURETIC PEPTIDE: CPT | Performed by: INTERNAL MEDICINE

## 2024-03-18 PROCEDURE — 90677 PCV20 VACCINE IM: CPT | Performed by: INTERNAL MEDICINE

## 2024-03-18 PROCEDURE — 90471 IMMUNIZATION ADMIN: CPT | Performed by: INTERNAL MEDICINE

## 2024-03-18 PROCEDURE — 93010 ELECTROCARDIOGRAM REPORT: CPT | Performed by: STUDENT IN AN ORGANIZED HEALTH CARE EDUCATION/TRAINING PROGRAM

## 2024-03-18 PROCEDURE — 99214 OFFICE O/P EST MOD 30 MIN: CPT | Mod: 25 | Performed by: INTERNAL MEDICINE

## 2024-03-18 PROCEDURE — 99397 PER PM REEVAL EST PAT 65+ YR: CPT | Mod: 25 | Performed by: INTERNAL MEDICINE

## 2024-03-18 RX ORDER — FUROSEMIDE 20 MG
20 TABLET ORAL DAILY
Qty: 90 TABLET | Refills: 4 | Status: SHIPPED | OUTPATIENT
Start: 2024-03-18 | End: 2024-06-06

## 2024-03-18 RX ORDER — METOPROLOL SUCCINATE 50 MG/1
100 TABLET, EXTENDED RELEASE ORAL DAILY
Status: SHIPPED
Start: 2024-03-18 | End: 2024-04-15 | Stop reason: ALTCHOICE

## 2024-03-18 NOTE — PROGRESS NOTES
Preventive Care Visit  [facility]  Tomasz Beltran MD, Internal Medicine  Mar 18, 2024      1. Annual physical exam  69-year-old woman with issues as below    2. Screen for colon cancer  Patient declines colon cancer screening    3. Visit for screening mammogram  - MA SCREENING DIGITAL BILAT - Future  (s+30); Future    4. Persistent atrial fibrillation (H), s/p ablation 2023  She is tachycardic today but an EKG shows sinus tachycardia, recommend increasing beta-blocker to 100 mg daily  - EKG 12-lead, tracing only  - metoprolol succinate ER (TOPROL XL) 50 MG 24 hr tablet; Take 2 tablets (100 mg) by mouth daily    5. Chronic systolic heart failure (H)  A bit of a tricky volume status assessment.  It seems like she has gained significant amount of caloric weight.  This would correspond with smoking cessation.  She is not edematous but she has crackles in her bibasilar lung fields.  I like to get a chest x-ray and a brain atretic peptide.  Increasing beta-blocker as above, at this point we will keep her on furosemide if lung findings do not represent pulmonary edema would consider decreasing her eliminating furosemide given her episodes of lightheadedness.  - furosemide (LASIX) 20 MG tablet; Take 1 tablet (20 mg) by mouth daily  Dispense: 90 tablet; Refill: 4  - BNP-N terminal pro; Future  - Comprehensive metabolic panel; Future  - CBC with platelets; Future  - TSH; Future  - UA Macroscopic with reflex to Microscopic and Culture - Lab Collect; Future  - BNP-N terminal pro  - Comprehensive metabolic panel  - CBC with platelets  - TSH  - UA Macroscopic with reflex to Microscopic and Culture - Lab Collect    6. Bilateral deafness    7. Mixed hyperlipidemia  Continue atorvastatin  - Lipid panel reflex to direct LDL Non-fasting; Future  - Lipid panel reflex to direct LDL Non-fasting    8. Essential hypertension  As above    9. Age-related osteoporosis without current pathological fracture  Continue with  weightbearing exercise, adequate calcium, adequate vitamin D, she has quit smoking which is excellent    10. Vitamin D deficiency  Continue with vitamin D    11. Gastroesophageal reflux disease without esophagitis  Currently stable    12. Simple chronic bronchitis (H)  Stable using albuterol inhaler which is helpful    13. Lung nodules  She had a CT in the fall, not specifically to look for lung cancer    To evaluate her coronary arteries and no nodules noted, would follow-up 1 year from that scan    14. Personal history of nicotine dependence  As above she is no longer smoking      Subjective   Verena is a 69 year old, presenting for the following:  Annual Visit (Pt wants to discuss furosemide, feels she is gaining weight while on it.)        3/18/2024     2:16 PM   Additional Questions   Roomed by Virgil KENDRICK RN   Failed to redirect to the Timeline version of the Game[employer] SmartLink.     Health Care Directive  Patient does not have a Health Care Directive or Living Will: Discussed advance care planning with patient; however, patient declined at this time.    HPI  69-year-old woman comes in for annual visit and evaluation of a few issues.  She continues to have episodes where she feels quite short of breath and gets a tight chest.  She will need to miss work.  Her weight is up quite a bit over the past few months.            3/16/2024   General Health   How would you rate your overall physical health? (!) FAIR   Feel stress (tense, anxious, or unable to sleep) Only a little   (!) STRESS CONCERN      3/16/2024   Nutrition   Diet: Regular (no restrictions)         3/16/2024   Exercise   Days per week of moderate/strenous exercise 0 days   Average minutes spent exercising at this level 0 min   (!) EXERCISE CONCERN      3/16/2024   Social Factors   Frequency of gathering with friends or relatives Patient declined   Worry food won't last until get money to buy more No   Food not last or not have enough money for food? No   Do  you have housing?  No   Are you worried about losing your housing? No   Lack of transportation? No   Unable to get utilities (heat,electricity)? No   Want help with housing or utility concern? No   (!) HOUSING CONCERN PRESENT      3/16/2024   Activities of Daily Living- Home Safety   Needs help with the following daily activites None of the above   Safety concerns in the home No grab bars in the bathroom         3/16/2024   Dental   Dentist two times every year? Yes         3/16/2024   Hearing Screening   Hearing concerns? None of the above         3/16/2024   Driving Risk Screening   Patient/family members have concerns about driving (!) DECLINE         3/16/2024   General Alertness/Fatigue Screening   Have you been more tired than usual lately? No         3/16/2024   Urinary Incontinence Screening   Bothered by leaking urine in past 6 months No         3/16/2024   TB Screening   Were you born outside of the [place]? No         Today's PHQ-2 Score:       3/18/2024     2:26 PM   PHQ-2 (  [employer])   Q1: Little interest or pleasure in doing things 1   Q2: Feeling down, depressed or hopeless 1   PHQ-2 Score 2   Q1: Little interest or pleasure in doing things Several days   Q2: Feeling down, depressed or hopeless Several days   PHQ-2 Score 2           3/16/2024   Substance Use   Alcohol more than 3/day or more than 7/wk Not Applicable   Do you have a current opioid prescription? No   How severe/bad is pain from 1 to 10? 8/10   Do you use any other substances recreationally? No     Social History     Tobacco Use    Smoking status: Former     Packs/day: .5     Types: Cigarettes     Quit date: 2023     Years since quittin.8     Passive exposure: Never    Smokeless tobacco: Never   Vaping Use    Vaping Use: Never used   Substance Use Topics    Alcohol use: No     Comment: Alcoholic Drinks/day: rare    Drug use: No           8/3/2023   LAST FHS-7 RESULTS   1st degree relative breast or ovarian cancer Unknown   Any  relative bilateral breast cancer Unknown   Any male have breast cancer No   Any ONE woman have BOTH breast AND ovarian cancer No   Any woman with breast cancer before 50yrs Unknown   2 or more relatives with breast AND/OR ovarian cancer Yes   2 or more relatives with breast AND/OR bowel cancer Yes            ASCVD Risk   The 10-year ASCVD risk score (Elton MEEK, et al., 2019) is: 9.9%    Values used to calculate the score:      Age: 69 years      Sex: Female      Is Non- : No      Diabetic: No      Tobacco smoker: No      Systolic Blood Pressure: 120 mmHg      Is BP treated: Yes      HDL Cholesterol: 81 mg/dL      Total Cholesterol: 171 mg/dL          Reviewed and updated as needed this visit by Provider                    Current providers sharing in care for this patient include:  Patient Care Team:  Tomasz Beltran MD as PCP - General (Internal Medicine)  Tomasz Beltran MD as Assigned PCP  Vandana Briscoe MD as MD (Cardiovascular Disease)  Carmen Plaza APRN CNP as Assigned Heart and Vascular Provider    The following health maintenance items are reviewed in Epic and correct as of today:  Health Maintenance   Topic Date Due    HF ACTION PLAN  Never done    COLORECTAL CANCER SCREENING  Never done    ZOSTER IMMUNIZATION (1 of 2) Never done    RSV VACCINE (Pregnancy & 60+) (1 - 1-dose 60+ series) Never done    MAMMO SCREENING  08/26/2020    DTAP/TDAP/TD IMMUNIZATION (2 - Td or Tdap) 06/01/2021    Pneumococcal Vaccine: 65+ Years (2 of 2 - PCV) 09/22/2021    ALT  09/27/2022    LUNG CANCER SCREENING  10/18/2022    INFLUENZA VACCINE (1) 09/01/2023    COVID-19 Vaccine (4 - 2023-24 season) 09/01/2023    MEDICARE ANNUAL WELLNESS VISIT  10/04/2023    LIPID  10/04/2023    ANNUAL REVIEW OF HM ORDERS  10/04/2023    BMP  04/02/2024    CBC  10/02/2024    FALL RISK ASSESSMENT  03/18/2025    GLUCOSE  10/02/2026    ADVANCE CARE PLANNING  10/04/2027    DEXA  06/19/2033    TSH W/FREE T4  "REFLEX  Completed    SPIROMETRY  Completed    HEPATITIS C SCREENING  Completed    PHQ-2 (once per calendar year)  Completed    COPD ACTION PLAN  Addressed    IPV IMMUNIZATION  Aged Out    HPV IMMUNIZATION  Aged Out    MENINGITIS IMMUNIZATION  Aged Out    RSV MONOCLONAL ANTIBODY  Aged Out         Review of Systems  Constitutional, HEENT, cardiovascular, pulmonary, gi and gu systems are negative, except as otherwise noted.     Objective    Exam  /62 (BP Location: Left arm, Patient Position: Sitting, Cuff Size: Adult Regular)   Pulse 103   Temp 98.9  F (37.2  C) (Tympanic)   Resp 21   Ht 1.656 m (5' 5.2\")   Wt 68.7 kg (151 lb 8 oz)   LMP  (LMP Unknown)   SpO2 97%   BMI 25.06 kg/m     Estimated body mass index is 25.06 kg/m  as calculated from the following:    Height as of this encounter: 1.656 m (5' 5.2\").    Weight as of this encounter: 68.7 kg (151 lb 8 oz).    Physical Exam  EYES: Eyelids, conjunctiva, and sclera were normal. Pupils were normal. Cornea, iris, and lens were normal bilaterally.  HEAD, EARS, NOSE, MOUTH, AND THROAT: Head and face were normal. Hearing was normal to voice and the ears were normal to external exam. Nose appearance was normal and there was no discharge. Oropharynx was normal.  NECK: Neck appearance was normal. There were no neck masses and the thyroid was not enlarged.  RESPIRATORY: Breathing pattern was normal and the chest moved symmetrically.  Bibasilar crackles  CARDIOVASCULAR: Heart rate was tachycardic and rhythm were normal.  S1 and S2 were normal and there were no extra sounds or murmurs. Peripheral pulses in arms and legs were normal.  Jugular venous pressure was normal.  There was no peripheral edema.  GASTROINTESTINAL: The abdomen was normal in contour.    NEUROLOGIC: The patient was alert and oriented to person, place, time, and circumstance. Speech was normal. Cranial nerves were normal. Motor strength was normal for age. The patient was normally " coordinated.  PSYCHIATRIC:  Mood and affect were normal and the patient had normal recent and remote memory. The patient's judgment and insight were normal.        3/18/2024   Mini Cog   Mini-Cog Not Completed (choose reason) Patient declines       Patient declines, there are NO concerns for cognitive deficits.          Signed Electronically by: Tomasz Beltran MD

## 2024-03-25 ENCOUNTER — TELEPHONE (OUTPATIENT)
Dept: INTERNAL MEDICINE | Facility: CLINIC | Age: 69
End: 2024-03-25
Payer: COMMERCIAL

## 2024-03-25 NOTE — TELEPHONE ENCOUNTER
Order Request    Who is requesting: patient    Orders being requested: xray orders for lunhgs    Reason service is needed/diagnosis: patient states was suppose to get xray done after most recent appt, 03/18/24.  However was unable to due to  issues     When are orders needed by: as soon as able    Has this been discussed with Provider: Yes    Does patient have a preference on a Group/Provider/Facility? [facility]    Does patient have an appointment scheduled?: No    Where to send orders: Place orders within Epic    Could we send this information to you in [employer] or would you prefer to receive a phone call?:   Patient would like to be contacted via [employer]

## 2024-03-26 ENCOUNTER — MYC MEDICAL ADVICE (OUTPATIENT)
Dept: INTERNAL MEDICINE | Facility: CLINIC | Age: 69
End: 2024-03-26
Payer: COMMERCIAL

## 2024-03-26 DIAGNOSIS — I50.22 CHRONIC SYSTOLIC HEART FAILURE (H): Primary | ICD-10-CM

## 2024-03-27 NOTE — TELEPHONE ENCOUNTER
3/18/24 Visit notes    5. Chronic systolic heart failure (H)  A bit of a tricky volume status assessment.  It seems like she has gained significant amount of caloric weight.  This would correspond with smoking cessation.  She is not edematous but she has crackles in her bibasilar lung fields.  I like to get a chest x-ray and a brain atretic peptide.  Increasing beta-blocker as above, at this point we will keep her on furosemide if lung findings do not represent pulmonary edema would consider decreasing her eliminating furosemide given her episodes of lightheadedness.

## 2024-04-03 ENCOUNTER — ANCILLARY PROCEDURE (OUTPATIENT)
Dept: MAMMOGRAPHY | Facility: CLINIC | Age: 69
End: 2024-04-03
Attending: INTERNAL MEDICINE
Payer: COMMERCIAL

## 2024-04-03 ENCOUNTER — ANCILLARY PROCEDURE (OUTPATIENT)
Dept: GENERAL RADIOLOGY | Facility: CLINIC | Age: 69
End: 2024-04-03
Attending: INTERNAL MEDICINE
Payer: COMMERCIAL

## 2024-04-03 DIAGNOSIS — Z12.31 VISIT FOR SCREENING MAMMOGRAM: ICD-10-CM

## 2024-04-03 DIAGNOSIS — I50.22 CHRONIC SYSTOLIC HEART FAILURE (H): ICD-10-CM

## 2024-04-03 PROCEDURE — 77063 BREAST TOMOSYNTHESIS BI: CPT | Mod: TC | Performed by: RADIOLOGY

## 2024-04-03 PROCEDURE — 71046 X-RAY EXAM CHEST 2 VIEWS: CPT | Mod: TC | Performed by: RADIOLOGY

## 2024-04-03 PROCEDURE — 77067 SCR MAMMO BI INCL CAD: CPT | Mod: TC | Performed by: RADIOLOGY

## 2024-04-15 ENCOUNTER — OFFICE VISIT (OUTPATIENT)
Dept: CARDIOLOGY | Facility: CLINIC | Age: 69
End: 2024-04-15
Attending: INTERNAL MEDICINE
Payer: COMMERCIAL

## 2024-04-15 VITALS
DIASTOLIC BLOOD PRESSURE: 60 MMHG | WEIGHT: 154 LBS | SYSTOLIC BLOOD PRESSURE: 140 MMHG | HEART RATE: 95 BPM | RESPIRATION RATE: 14 BRPM | BODY MASS INDEX: 25.47 KG/M2

## 2024-04-15 DIAGNOSIS — I48.19 PERSISTENT ATRIAL FIBRILLATION (H): ICD-10-CM

## 2024-04-15 DIAGNOSIS — I42.8 OTHER CARDIOMYOPATHY (H): ICD-10-CM

## 2024-04-15 DIAGNOSIS — I48.91 ATRIAL FIBRILLATION WITH RVR (H): ICD-10-CM

## 2024-04-15 DIAGNOSIS — Z98.890 STATUS POST CATHETER ABLATION OF ATRIAL FIBRILLATION: ICD-10-CM

## 2024-04-15 DIAGNOSIS — I50.22 CHRONIC SYSTOLIC HEART FAILURE (H): ICD-10-CM

## 2024-04-15 DIAGNOSIS — I10 ESSENTIAL HYPERTENSION: ICD-10-CM

## 2024-04-15 DIAGNOSIS — E78.5 DYSLIPIDEMIA: Primary | ICD-10-CM

## 2024-04-15 PROCEDURE — 99214 OFFICE O/P EST MOD 30 MIN: CPT | Performed by: INTERNAL MEDICINE

## 2024-04-15 PROCEDURE — 93000 ELECTROCARDIOGRAM COMPLETE: CPT | Performed by: INTERNAL MEDICINE

## 2024-04-15 RX ORDER — METOPROLOL SUCCINATE 100 MG/1
100 TABLET, EXTENDED RELEASE ORAL DAILY
Qty: 90 TABLET | Refills: 3 | Status: SHIPPED | OUTPATIENT
Start: 2024-04-15

## 2024-04-15 NOTE — PROGRESS NOTES
[facility] [address], [place] [address]   [email or web address]   OFFICE: [phone]            Impression and Plan     1.  Atrial fibrillation/atrial flutter.  Verena history of atrial fibrillation and underwent concomitant pulmonary vein isolation procedure for atrial fibrillation and CTI ablation for induced CTI-dependent atrial flutter on 21 August 2023.  Ambulatory monitor x 7 days from 27 December 2023 through 10 January 2024 revealed no recurrent atrial arrhythmias.  ECG on interview today also confirms sinus rhythm.  Plan:  Continue apixaban 5 mg twice daily.  Continue metoprolol succinate 100 mg daily     2.  Cardiomyopathy.  Echocardiogram 7 June 2023 revealed ejection fraction of 20-25%.  This is felt likely tachycardia mediated from prolonged atrial arrhythmias with rapid ventricular response.      Follow-up echocardiogram 23 October 2023 revealed noticeable improvement in left ventricular systolic performance with ejection fraction 45-50%.  Continue metoprolol succinate 100 mg daily as per problem #1.  Continue losartan 50 mg daily.  Continue furosemide 20 mg daily.  Plan to obtain repeat echocardiogram to reassess left ventricular function.     3.  Coronary artery disease.  Verena has coronary artery disease by virtue of CT coronary angiogram 15 November 2023 revealing mild calcified stenosis involving the proximal LAD, but otherwise no appreciable disease.    4.  Hypertension.  Will continue to monitor for now.  Continue current management as per problems #1 and 2.    5.  Dyslipidemia.  Lipid profile 18 March 2024 revealed LDL 87 mg/dL and HDL 86 mg/dL. She does put report some muscle ache though waxes and wanes.  Will pursue a trial off atorvastatin for 2 weeks to see if the statin therapy may be a contributor.     Follow-up and further recommendations pending echocardiogram findings.    35 minutes spent reviewing prior records (including  documentation, laboratory studies, cardiac testing/imaging), interview with patient along with physical exam, planning, and subsequent documentation/crafting of note).           History of Present Illness    Once again I would like to thank you again for asking me to participate in the care of your patient, Verena Lagunas.  As you know, but to reiterate for my own records, Verena Lagunas is a 69 year old female with history of atrial fibrillation and underwent concomitant pulmonary vein isolation procedure for atrial fibrillation and CTI ablation for induced CTI-dependent atrial flutter on 21 August 2023.  She was noted to have progressive dyspnea starting April 2023.  She was subsequently found to have atrial fibrillation with rapid ventricular response in May 2023.  Echocardiogram 7 June 2023 revealed severe depression and left ventricular systolic performance with ejection fraction of 20-25%.  Follow-up echocardiogram, however, 23 October 2023 revealed noticeably improved ejection fraction at 45-50%.    On interview Verena reports some discomfort in her chest that she seems to notice in the mornings.  It seems to resolve as she gets on with her day.  She wonders if it may be related to one of her medications.  She also states that she feels slightly unsteady at times though has not had any falling issues.    Further review of systems is otherwise negative/noncontributory (medical record and 13 point review of systems reviewed as well and pertinent positives noted).         Cardiac Diagnostics      Echocardiogram 23 October 2023:  Normal left ventricular size and systolic performance with ejection fraction of 45-50%.  There is mild global reduction in left ventricular systolic performance.  Mild mitral stenosis.  Normal right ventricular size and systolic performance.  Severe left atrial enlargement.  Mild right atrial enlargement.    Echocardiogram 7 June 2023:  Mild left ventricular enlargement with severe  depression left ventricular systolic performance ejection fraction 20-25%.  There is severe global reduction left ventricular systolic performance.  Mild-moderate mitral insufficiency.  Mild right ventricular enlargement with moderately reduced right ventricular systolic performance.  Severe left atrial enlargement.  Moderate right atrial enlargement.    CT coronary calcium score 15 November 2023:  The total Agatston score is 34. A calcium score in this range places the individual in the 50th percentile when compared to an age and gender matched control group and implies a moderate risk of cardiac events in the next ten years.  Right dominant coronary artery system  Mild calcfied stenosis in the proximal left anterior descending artery.  The remainder of the vessels appears patent.    Ambulatory monitor x 7 days from 27 December 2023 through 10 January 2024:  Baseline rhythm was sinus rhythm 100bpm.    Reported heart rate range 55 to 118bpm, average 90bpm.  16 symptom triggers correlated to sinus rhythm, including 4 episodes with PACs and PVCs.  No nonsustained or sustained tachyarrhythmias.  No atrial fibrillation.  There were no pauses of over 3.0s.  Supraventricular and ventricular ectopic beat frequency are not reported on this monitoring modality.      Twelve-lead ECG (personally reviewed) 15 April 2024: Sinus rhythm.  Heart rate 89 bpm.    Twelve-lead ECG (personally reviewed) 21 August 2023: Sinus rhythm.  ST depression with T wave inversion in anterolateral leads.         Physical Examination       BP (!) 140/60 (BP Location: Right arm, Patient Position: Sitting, Cuff Size: Adult Regular)   Pulse 95   Resp 14   Wt 69.9 kg (154 lb)   LMP  (LMP Unknown)   BMI 25.47 kg/m          Wt Readings from Last 3 Encounters:   04/15/24 69.9 kg (154 lb)   03/18/24 68.7 kg (151 lb 8 oz)   12/04/23 61.7 kg (136 lb)       The patient is alert and oriented times three. Sclerae are anicteric. Mucosal membranes are moist.  Jugular venous pressure is normal. No significant adenopathy/thyromegally appreciated. Lungs are clear with good expansion. On cardiovascular exam, the patient has a regular S1 and S2. Abdomen is soft and non-tender. Extremities reveal no clubbing, cyanosis, or edema.       Family History/Social History/Risk Factors   Patient does not smoke.  Family history reviewed, and family history includes Breast Cancer (age of onset: 75) in her maternal aunt; Cancer in her brother; Colon Cancer (age of onset: 79) in her mother; Lung Cancer (age of onset: 50) in her father; No Known Problems in her sister; Other - See Comments in her brother.          Medical History  Surgical History Family History Social History   Past Medical History:   Diagnosis Date    Allergic rhinitis     Hypertension     Tobacco abuse      Past Surgical History:   Procedure Laterality Date    EP ABLATION PULMONARY VEIN ISOLATION N/A 2023    Procedure: Ablation Atrial Fibrillation;  Surgeon: Vandana Briscoe MD;  Location: [facility]    OVARIAN CYST REMOVAL Right 1970     Family History   Problem Relation Age of Onset    Colon Cancer Mother 79            Lung Cancer Father 50            No Known Problems Sister     Other - See Comments Brother         6 brothers and 1 sister    Cancer Brother     Breast Cancer Maternal Aunt 75        Social History     Socioeconomic History    Marital status:      Spouse name: Not on file    Number of children: Not on file    Years of education: Not on file    Highest education level: Not on file   Occupational History    Not on file   Tobacco Use    Smoking status: Former     Current packs/day: 0.00     Types: Cigarettes     Quit date: 2023     Years since quittin.8     Passive exposure: Never    Smokeless tobacco: Never   Vaping Use    Vaping status: Never Used   Substance and Sexual Activity    Alcohol use: No     Comment: Alcoholic Drinks/day: rare    Drug use: No     Sexual activity: Not on file   Other Topics Concern    Not on file   Social History Narrative    [place]  Mynor.  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  No children.       Social Determinants of Health     Financial Resource Strain: Low Risk  (3/16/2024)    Financial Resource Strain     Within the past 12 months, have you or your family members you live with been unable to get utilities (heat, electricity) when it was really needed?: No   Food Insecurity: Low Risk  (3/16/2024)    Food Insecurity     Within the past 12 months, did you worry that your food would run out before you got money to buy more?: No     Within the past 12 months, did the food you bought just not last and you didn t have money to get more?: No   Transportation Needs: Low Risk  (3/16/2024)    Transportation Needs     Within the past 12 months, has lack of transportation kept you from medical appointments, getting your medicines, non-medical meetings or appointments, work, or from getting things that you need?: No   Physical Activity: Inactive (3/16/2024)    Exercise Vital Sign     Days of Exercise per Week: 0 days     Minutes of Exercise per Session: 0 min   Stress: No Stress Concern Present (3/16/2024)    [place] [employer] - Occupational Stress Questionnaire     Feeling of Stress : Only a little   Social Connections: Unknown (3/16/2024)    Social Connection and Isolation Panel [NHANES]     Frequency of Communication with Friends and Family: Not on file     Frequency of Social Gatherings with Friends and Family: Patient declined     Attends Latter-day Services: Not on file     Active Member of Clubs or Organizations: Not on file     Attends Club or Organization Meetings: Not on file     Marital Status: Not on file   Interpersonal Safety: Unknown (3/18/2024)    Interpersonal Safety      Do you feel physically and emotionally safe where you currently live?: Patient declined     Within the past 12 months, have you been hit, slapped, kicked or otherwise physically hurt by someone?: Patient declined     Within the past 12 months, have you been humiliated or emotionally abused in other ways by your partner or ex-partner?: Patient declined   Housing Stability: High Risk (3/16/2024)    Housing Stability     Do you have housing? : No     Are you worried about losing your housing?: No           Medications  Allergies   Current Outpatient Medications   Medication Sig Dispense Refill    albuterol (PROAIR HFA/PROVENTIL HFA/VENTOLIN HFA) 108 (90 Base) MCG/ACT inhaler Inhale 2 puffs into the lungs every 6 hours as needed for shortness of breath, wheezing or cough 18 g 11    apixaban ANTICOAGULANT (ELIQUIS ANTICOAGULANT) 5 MG tablet Take 1 tablet (5 mg) by mouth 2 times daily 180 tablet 3    atorvastatin (LIPITOR) 20 MG tablet TAKE 1 TABLET(20 MG) BY MOUTH DAILY 90 tablet 3    cholecalciferol, vitamin D3, (VITAMIN D3) 1,000 unit capsule [CHOLECALCIFEROL, VITAMIN D3, (VITAMIN D3) 1,000 UNIT CAPSULE] Take 1 capsule (1,000 Units total) by mouth daily.  0    furosemide (LASIX) 20 MG tablet Take 1 tablet (20 mg) by mouth daily 90 tablet 4    losartan (COZAAR) 50 MG tablet TAKE 1 TABLET(50 MG) BY MOUTH DAILY 90 tablet 1    metoprolol succinate ER (TOPROL XL) 50 MG 24 hr tablet Take 2 tablets (100 mg) by mouth daily         Allergies   Allergen Reactions    Cat Hair Std Allergenic Ext [Cat Hair Extract] Unknown    Penicillins Shortness Of Breath          Lab Results    Chemistry/lipid CBC Cardiac Enzymes/BNP/TSH/INR   Recent Labs   Lab Test 03/18/24  1553   CHOL 189   HDL 86   LDL 87   TRIG 81     Recent Labs   Lab Test 03/18/24  1553 10/04/22  1214 09/27/21  1508   LDL 87 77 65     Recent Labs   Lab Test 03/18/24  1553      POTASSIUM 4.2   CHLORIDE 102   CO2 27   GLC 95   BUN 16.5   CR 0.85   GFRESTIMATED 73  "  MYRANDA 9.6     Recent Labs   Lab Test 03/18/24  1553 11/15/23  0910 10/02/23  1012   CR 0.85 0.9 0.98*     Recent Labs   Lab Test 12/22/16  1117   A1C 5.6          Recent Labs   Lab Test 03/18/24  1553   WBC 8.7   HGB 12.7   HCT 39.6   MCV 88        Recent Labs   Lab Test 03/18/24  1553 10/02/23  1012 08/16/23  1428   HGB 12.7 13.7 13.3    No results for input(s): \"TROPONINI\" in the last 54005 hours.  Recent Labs   Lab Test 03/18/24  1553 06/01/23  1037   BNP  --  251*   NTBNP 854  --      Recent Labs   Lab Test 03/18/24  1553   TSH 2.48     No results for input(s): \"INR\" in the last 65624 hours.       Medications  Allergies   Current Outpatient Medications   Medication Sig Dispense Refill    albuterol (PROAIR HFA/PROVENTIL HFA/VENTOLIN HFA) 108 (90 Base) MCG/ACT inhaler Inhale 2 puffs into the lungs every 6 hours as needed for shortness of breath, wheezing or cough 18 g 11    apixaban ANTICOAGULANT (ELIQUIS ANTICOAGULANT) 5 MG tablet Take 1 tablet (5 mg) by mouth 2 times daily 180 tablet 3    atorvastatin (LIPITOR) 20 MG tablet TAKE 1 TABLET(20 MG) BY MOUTH DAILY 90 tablet 3    cholecalciferol, vitamin D3, (VITAMIN D3) 1,000 unit capsule [CHOLECALCIFEROL, VITAMIN D3, (VITAMIN D3) 1,000 UNIT CAPSULE] Take 1 capsule (1,000 Units total) by mouth daily.  0    furosemide (LASIX) 20 MG tablet Take 1 tablet (20 mg) by mouth daily 90 tablet 4    losartan (COZAAR) 50 MG tablet TAKE 1 TABLET(50 MG) BY MOUTH DAILY 90 tablet 1    metoprolol succinate ER (TOPROL XL) 50 MG 24 hr tablet Take 2 tablets (100 mg) by mouth daily        Allergies   Allergen Reactions    Cat Hair Std Allergenic Ext [Cat Hair Extract] Unknown    Penicillins Shortness Of Breath          Lab Results   Lab Results   Component Value Date     03/18/2024    CO2 27 03/18/2024    CO2 28 09/17/2022    BUN 16.5 03/18/2024    BUN 11 09/17/2022     Lab Results   Component Value Date    WBC 8.7 03/18/2024    HGB 12.7 03/18/2024    HCT 39.6 03/18/2024    " "MCV 88 03/18/2024     03/18/2024     Lab Results   Component Value Date    CHOL 189 03/18/2024    TRIG 81 03/18/2024    HDL 86 03/18/2024     No results found for: \"INR\"  Lab Results   Component Value Date     06/01/2023     No results found for: \"CKTOTAL\", \"CKMB\", \"TROPONINI\"  Lab Results   Component Value Date    TSH 2.48 03/18/2024    TSH 0.62 09/27/2021                  "

## 2024-04-15 NOTE — PATIENT INSTRUCTIONS
Stop the atorvastatin for two weeks to see if that makes things better.     Follow-up to be determined.

## 2024-04-15 NOTE — LETTER
4/15/2024    Tomasz Beltran MD  [address] [place] [address]    RE: Verena Lagunas       Dear Colleague,     I had the pleasure of seeing Verena Lagunas in the [facility].         [facility]   [address], [place] [address]   [email or web address]   OFFICE: [phone]            Impression and Plan     1.  Atrial fibrillation/atrial flutter.  Verena history of atrial fibrillation and underwent concomitant pulmonary vein isolation procedure for atrial fibrillation and CTI ablation for induced CTI-dependent atrial flutter on 21 August 2023.  Ambulatory monitor x 7 days from 27 December 2023 through 10 January 2024 revealed no recurrent atrial arrhythmias.  ECG on interview today also confirms sinus rhythm.  Plan:  Continue apixaban 5 mg twice daily.  Continue metoprolol succinate 100 mg daily     2.  Cardiomyopathy.  Echocardiogram 7 June 2023 revealed ejection fraction of 20-25%.  This is felt likely tachycardia mediated from prolonged atrial arrhythmias with rapid ventricular response.      Follow-up echocardiogram 23 October 2023 revealed noticeable improvement in left ventricular systolic performance with ejection fraction 45-50%.  Continue metoprolol succinate 100 mg daily as per problem #1.  Continue losartan 50 mg daily.  Continue furosemide 20 mg daily.  Plan to obtain repeat echocardiogram to reassess left ventricular function.     3.  Coronary artery disease.  Verena has coronary artery disease by virtue of CT coronary angiogram 15 November 2023 revealing mild calcified stenosis involving the proximal LAD, but otherwise no appreciable disease.    4.  Hypertension.  Will continue to monitor for now.  Continue current management as per problems #1 and 2.    5.  Dyslipidemia.  Lipid profile 18 March 2024 revealed LDL 87 mg/dL and HDL 86 mg/dL. She does put report some muscle ache though waxes and wanes.  Will pursue a trial off  atorvastatin for 2 weeks to see if the statin therapy may be a contributor.     Follow-up and further recommendations pending echocardiogram findings.    35 minutes spent reviewing prior records (including documentation, laboratory studies, cardiac testing/imaging), interview with patient along with physical exam, planning, and subsequent documentation/crafting of note).           History of Present Illness    Once again I would like to thank you again for asking me to participate in the care of your patient, Verena Lagunas.  As you know, but to reiterate for my own records, Verena Lagunas is a 69 year old female with history of atrial fibrillation and underwent concomitant pulmonary vein isolation procedure for atrial fibrillation and CTI ablation for induced CTI-dependent atrial flutter on 21 August 2023.  She was noted to have progressive dyspnea starting April 2023.  She was subsequently found to have atrial fibrillation with rapid ventricular response in May 2023.  Echocardiogram 7 June 2023 revealed severe depression and left ventricular systolic performance with ejection fraction of 20-25%.  Follow-up echocardiogram, however, 23 October 2023 revealed noticeably improved ejection fraction at 45-50%.    On interview Verena reports some discomfort in her chest that she seems to notice in the mornings.  It seems to resolve as she gets on with her day.  She wonders if it may be related to one of her medications.  She also states that she feels slightly unsteady at times though has not had any falling issues.    Further review of systems is otherwise negative/noncontributory (medical record and 13 point review of systems reviewed as well and pertinent positives noted).         Cardiac Diagnostics      Echocardiogram 23 October 2023:  Normal left ventricular size and systolic performance with ejection fraction of 45-50%.  There is mild global reduction in left ventricular systolic performance.  Mild mitral  stenosis.  Normal right ventricular size and systolic performance.  Severe left atrial enlargement.  Mild right atrial enlargement.    Echocardiogram 7 June 2023:  Mild left ventricular enlargement with severe depression left ventricular systolic performance ejection fraction 20-25%.  There is severe global reduction left ventricular systolic performance.  Mild-moderate mitral insufficiency.  Mild right ventricular enlargement with moderately reduced right ventricular systolic performance.  Severe left atrial enlargement.  Moderate right atrial enlargement.    CT coronary calcium score 15 November 2023:  The total Agatston score is 34. A calcium score in this range places the individual in the 50th percentile when compared to an age and gender matched control group and implies a moderate risk of cardiac events in the next ten years.  Right dominant coronary artery system  Mild calcfied stenosis in the proximal left anterior descending artery.  The remainder of the vessels appears patent.    Ambulatory monitor x 7 days from 27 December 2023 through 10 January 2024:  Baseline rhythm was sinus rhythm 100bpm.    Reported heart rate range 55 to 118bpm, average 90bpm.  16 symptom triggers correlated to sinus rhythm, including 4 episodes with PACs and PVCs.  No nonsustained or sustained tachyarrhythmias.  No atrial fibrillation.  There were no pauses of over 3.0s.  Supraventricular and ventricular ectopic beat frequency are not reported on this monitoring modality.      Twelve-lead ECG (personally reviewed) 15 April 2024: Sinus rhythm.  Heart rate 89 bpm.    Twelve-lead ECG (personally reviewed) 21 August 2023: Sinus rhythm.  ST depression with T wave inversion in anterolateral leads.         Physical Examination       BP (!) 140/60 (BP Location: Right arm, Patient Position: Sitting, Cuff Size: Adult Regular)   Pulse 95   Resp 14   Wt 69.9 kg (154 lb)   LMP  (LMP Unknown)   BMI 25.47 kg/m          Wt Readings from  Last 3 Encounters:   04/15/24 69.9 kg (154 lb)   24 68.7 kg (151 lb 8 oz)   23 61.7 kg (136 lb)       The patient is alert and oriented times three. Sclerae are anicteric. Mucosal membranes are moist. Jugular venous pressure is normal. No significant adenopathy/thyromegally appreciated. Lungs are clear with good expansion. On cardiovascular exam, the patient has a regular S1 and S2. Abdomen is soft and non-tender. Extremities reveal no clubbing, cyanosis, or edema.       Family History/Social History/Risk Factors   Patient does not smoke.  Family history reviewed, and family history includes Breast Cancer (age of onset: 75) in her maternal aunt; Cancer in her brother; Colon Cancer (age of onset: 79) in her mother; Lung Cancer (age of onset: 50) in her father; No Known Problems in her sister; Other - See Comments in her brother.          Medical History  Surgical History Family History Social History   Past Medical History:   Diagnosis Date    Allergic rhinitis     Hypertension     Tobacco abuse      Past Surgical History:   Procedure Laterality Date    EP ABLATION PULMONARY VEIN ISOLATION N/A 2023    Procedure: Ablation Atrial Fibrillation;  Surgeon: Vandana Briscoe MD;  Location: [facility] CV    OVARIAN CYST REMOVAL Right 1970     Family History   Problem Relation Age of Onset    Colon Cancer Mother 79            Lung Cancer Father 50            No Known Problems Sister     Other - See Comments Brother         6 brothers and 1 sister    Cancer Brother     Breast Cancer Maternal Aunt 75        Social History     Socioeconomic History    Marital status:      Spouse name: Not on file    Number of children: Not on file    Years of education: Not on file    Highest education level: Not on file   Occupational History    Not on file   Tobacco Use    Smoking status: Former     Current packs/day: 0.00     Types: Cigarettes     Quit date: 2023     Years since  quittin.8     Passive exposure: Never    Smokeless tobacco: Never   Vaping Use    Vaping status: Never Used   Substance and Sexual Activity    Alcohol use: No     Comment: Alcoholic Drinks/day: rare    Drug use: No    Sexual activity: Not on file   Other Topics Concern    Not on file   Social History Narrative    [place]  [place]ynor.  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  No children.       Social Determinants of Health     Financial Resource Strain: Low Risk  (3/16/2024)    Financial Resource Strain     Within the past 12 months, have you or your family members you live with been unable to get utilities (heat, electricity) when it was really needed?: No   Food Insecurity: Low Risk  (3/16/2024)    Food Insecurity     Within the past 12 months, did you worry that your food would run out before you got money to buy more?: No     Within the past 12 months, did the food you bought just not last and you didn t have money to get more?: No   Transportation Needs: Low Risk  (3/16/2024)    Transportation Needs     Within the past 12 months, has lack of transportation kept you from medical appointments, getting your medicines, non-medical meetings or appointments, work, or from getting things that you need?: No   Physical Activity: Inactive (3/16/2024)    Exercise Vital Sign     Days of Exercise per Week: 0 days     Minutes of Exercise per Session: 0 min   Stress: No Stress Concern Present (3/16/2024)    [employer] - Occupational Stress Questionnaire     Feeling of Stress : Only a little   Social Connections: Unknown (3/16/2024)    Social Connection and Isolation Panel [NHANES]     Frequency of Communication with Friends and Family: Not on file     Frequency of Social Gatherings with Friends and Family: Patient declined     Attends [employer] Services: Not on  file     Active Member of Clubs or Organizations: Not on file     Attends Club or Organization Meetings: Not on file     Marital Status: Not on file   Interpersonal Safety: Unknown (3/18/2024)    Interpersonal Safety     Do you feel physically and emotionally safe where you currently live?: Patient declined     Within the past 12 months, have you been hit, slapped, kicked or otherwise physically hurt by someone?: Patient declined     Within the past 12 months, have you been humiliated or emotionally abused in other ways by your partner or ex-partner?: Patient declined   Housing Stability: High Risk (3/16/2024)    Housing Stability     Do you have housing? : No     Are you worried about losing your housing?: No           Medications  Allergies   Current Outpatient Medications   Medication Sig Dispense Refill    albuterol (PROAIR HFA/PROVENTIL HFA/VENTOLIN HFA) 108 (90 Base) MCG/ACT inhaler Inhale 2 puffs into the lungs every 6 hours as needed for shortness of breath, wheezing or cough 18 g 11    apixaban ANTICOAGULANT (ELIQUIS ANTICOAGULANT) 5 MG tablet Take 1 tablet (5 mg) by mouth 2 times daily 180 tablet 3    atorvastatin (LIPITOR) 20 MG tablet TAKE 1 TABLET(20 MG) BY MOUTH DAILY 90 tablet 3    cholecalciferol, vitamin D3, (VITAMIN D3) 1,000 unit capsule [CHOLECALCIFEROL, VITAMIN D3, (VITAMIN D3) 1,000 UNIT CAPSULE] Take 1 capsule (1,000 Units total) by mouth daily.  0    furosemide (LASIX) 20 MG tablet Take 1 tablet (20 mg) by mouth daily 90 tablet 4    losartan (COZAAR) 50 MG tablet TAKE 1 TABLET(50 MG) BY MOUTH DAILY 90 tablet 1    metoprolol succinate ER (TOPROL XL) 50 MG 24 hr tablet Take 2 tablets (100 mg) by mouth daily         Allergies   Allergen Reactions    Cat Hair Std Allergenic Ext [Cat Hair Extract] Unknown    Penicillins Shortness Of Breath          Lab Results    Chemistry/lipid CBC Cardiac Enzymes/BNP/TSH/INR   Recent Labs   Lab Test 03/18/24  1553   CHOL 189   HDL 86   LDL 87   TRIG 81  "    Recent Labs   Lab Test 03/18/24  1553 10/04/22  1214 09/27/21  1508   LDL 87 77 65     Recent Labs   Lab Test 03/18/24  1553      POTASSIUM 4.2   CHLORIDE 102   CO2 27   GLC 95   BUN 16.5   CR 0.85   GFRESTIMATED 73   MYRANDA 9.6     Recent Labs   Lab Test 03/18/24  1553 11/15/23  0910 10/02/23  1012   CR 0.85 0.9 0.98*     Recent Labs   Lab Test 12/22/16  1117   A1C 5.6          Recent Labs   Lab Test 03/18/24  1553   WBC 8.7   HGB 12.7   HCT 39.6   MCV 88        Recent Labs   Lab Test 03/18/24  1553 10/02/23  1012 08/16/23  1428   HGB 12.7 13.7 13.3    No results for input(s): \"TROPONINI\" in the last 33988 hours.  Recent Labs   Lab Test 03/18/24  1553 06/01/23  1037   BNP  --  251*   NTBNP 854  --      Recent Labs   Lab Test 03/18/24  1553   TSH 2.48     No results for input(s): \"INR\" in the last 31415 hours.       Medications  Allergies   Current Outpatient Medications   Medication Sig Dispense Refill    albuterol (PROAIR HFA/PROVENTIL HFA/VENTOLIN HFA) 108 (90 Base) MCG/ACT inhaler Inhale 2 puffs into the lungs every 6 hours as needed for shortness of breath, wheezing or cough 18 g 11    apixaban ANTICOAGULANT (ELIQUIS ANTICOAGULANT) 5 MG tablet Take 1 tablet (5 mg) by mouth 2 times daily 180 tablet 3    atorvastatin (LIPITOR) 20 MG tablet TAKE 1 TABLET(20 MG) BY MOUTH DAILY 90 tablet 3    cholecalciferol, vitamin D3, (VITAMIN D3) 1,000 unit capsule [CHOLECALCIFEROL, VITAMIN D3, (VITAMIN D3) 1,000 UNIT CAPSULE] Take 1 capsule (1,000 Units total) by mouth daily.  0    furosemide (LASIX) 20 MG tablet Take 1 tablet (20 mg) by mouth daily 90 tablet 4    losartan (COZAAR) 50 MG tablet TAKE 1 TABLET(50 MG) BY MOUTH DAILY 90 tablet 1    metoprolol succinate ER (TOPROL XL) 50 MG 24 hr tablet Take 2 tablets (100 mg) by mouth daily        Allergies   Allergen Reactions    Cat Hair Std Allergenic Ext [Cat Hair Extract] Unknown    Penicillins Shortness Of Breath          Lab Results   Lab Results   Component " "Value Date     03/18/2024    CO2 27 03/18/2024    CO2 28 09/17/2022    BUN 16.5 03/18/2024    BUN 11 09/17/2022     Lab Results   Component Value Date    WBC 8.7 03/18/2024    HGB 12.7 03/18/2024    HCT 39.6 03/18/2024    MCV 88 03/18/2024     03/18/2024     Lab Results   Component Value Date    CHOL 189 03/18/2024    TRIG 81 03/18/2024    HDL 86 03/18/2024     No results found for: \"INR\"  Lab Results   Component Value Date     06/01/2023     No results found for: \"CKTOTAL\", \"CKMB\", \"TROPONINI\"  Lab Results   Component Value Date    TSH 2.48 03/18/2024    TSH 0.62 09/27/2021                      Thank you for allowing me to participate in the care of your patient.      Sincerely,     Reynaldo Nance MD     [facility]  cc:   Reynaldo Nance MD  [address]  [place] [address]      "

## 2024-04-16 LAB
ATRIAL RATE - MUSE: 89 BPM
DIASTOLIC BLOOD PRESSURE - MUSE: NORMAL MMHG
INTERPRETATION ECG - MUSE: NORMAL
P AXIS - MUSE: 67 DEGREES
PR INTERVAL - MUSE: 136 MS
QRS DURATION - MUSE: 90 MS
QT - MUSE: 374 MS
QTC - MUSE: 455 MS
R AXIS - MUSE: 73 DEGREES
SYSTOLIC BLOOD PRESSURE - MUSE: NORMAL MMHG
T AXIS - MUSE: 67 DEGREES
VENTRICULAR RATE- MUSE: 89 BPM

## 2024-05-01 ENCOUNTER — HOSPITAL ENCOUNTER (OUTPATIENT)
Dept: CARDIOLOGY | Facility: HOSPITAL | Age: 69
Discharge: HOME | End: 2024-05-01
Attending: INTERNAL MEDICINE | Admitting: INTERNAL MEDICINE
Payer: COMMERCIAL

## 2024-05-01 DIAGNOSIS — I42.8 OTHER CARDIOMYOPATHY (H): ICD-10-CM

## 2024-05-01 LAB — LVEF ECHO: NORMAL

## 2024-05-01 PROCEDURE — T1013 SIGN LANG/ORAL INTERPRETER: HCPCS | Mod: U3

## 2024-05-01 PROCEDURE — 93306 TTE W/DOPPLER COMPLETE: CPT | Mod: 26 | Performed by: STUDENT IN AN ORGANIZED HEALTH CARE EDUCATION/TRAINING PROGRAM

## 2024-05-01 PROCEDURE — 93306 TTE W/DOPPLER COMPLETE: CPT

## 2024-05-03 ENCOUNTER — TELEPHONE (OUTPATIENT)
Dept: CARDIOLOGY | Facility: CLINIC | Age: 69
End: 2024-05-03
Payer: COMMERCIAL

## 2024-05-03 DIAGNOSIS — I42.8 OTHER CARDIOMYOPATHY (H): ICD-10-CM

## 2024-05-03 DIAGNOSIS — I50.22 CHRONIC SYSTOLIC HEART FAILURE (H): Primary | ICD-10-CM

## 2024-05-03 NOTE — TELEPHONE ENCOUNTER
PC with patient also documented today. No call made. See echo report for documentation and sufficient discussion with patient regarding results. JACOB,RN

## 2024-05-03 NOTE — PROGRESS NOTES
Reynaldo Nance MD  5/2/2024  7:21 AM CDT       Echocardiogram is reassuring and that it reveals normalization of left ventricular function.  Does have some mild mitral stenosis and mild-moderate mitral insufficiency that will require continued intermittent monitoring.  Specifically, would recommend repeat echocardiogram in 1 year.  Feel Verena could likely just follow-up in 1 year after repeat echocardiogram.  If having concerns or issues in the interim, however, could certainly see sooner.  Thanks.           ==echo + annual visit ordered for in April 2025. -[facility]

## 2024-05-03 NOTE — TELEPHONE ENCOUNTER
Health Call Center    Phone Message    May a detailed message be left on voicemail: yes     Reason for Call: Requesting Results     Name/type of test: echo  Date of test: 5/1  Was test done at a location other than [facility] (Please fill in the location if not [facility])?: No    Action Taken: Other: cardio    Travel Screening: Not Applicable

## 2024-05-15 ENCOUNTER — TELEPHONE (OUTPATIENT)
Dept: INTERNAL MEDICINE | Facility: CLINIC | Age: 69
End: 2024-05-15

## 2024-05-15 NOTE — TELEPHONE ENCOUNTER
General Call      Reason for Call:  pt is hearing impaired and asking any response please go thru My Chart  Pt is stating that the Losartan is making her dizzy    Please advise    What are your questions or concerns:  n/a    Date of last appointment with provider: n/a    Could we send this information to you in [facility] or would you prefer to receive a phone call?:   Patient would prefer a phone call   Okay to leave a detailed message?: Yes at Home number on file [phone] (home)

## 2024-05-16 NOTE — TELEPHONE ENCOUNTER
I would not recommend stopping lisinopril without clinical assessment.  I think she should make an appointment to be evaluated for her symptoms.

## 2024-05-31 ENCOUNTER — TELEPHONE (OUTPATIENT)
Dept: INTERNAL MEDICINE | Facility: CLINIC | Age: 69
End: 2024-05-31

## 2024-06-03 ENCOUNTER — HOSPITAL ENCOUNTER (EMERGENCY)
Facility: HOSPITAL | Age: 69
Discharge: HOME | End: 2024-06-03
Attending: STUDENT IN AN ORGANIZED HEALTH CARE EDUCATION/TRAINING PROGRAM | Admitting: STUDENT IN AN ORGANIZED HEALTH CARE EDUCATION/TRAINING PROGRAM
Payer: COMMERCIAL

## 2024-06-03 ENCOUNTER — APPOINTMENT (OUTPATIENT)
Dept: RADIOLOGY | Facility: HOSPITAL | Age: 69
End: 2024-06-03
Attending: STUDENT IN AN ORGANIZED HEALTH CARE EDUCATION/TRAINING PROGRAM
Payer: COMMERCIAL

## 2024-06-03 VITALS
RESPIRATION RATE: 14 BRPM | HEART RATE: 98 BPM | BODY MASS INDEX: 26.58 KG/M2 | OXYGEN SATURATION: 93 % | SYSTOLIC BLOOD PRESSURE: 120 MMHG | WEIGHT: 160.7 LBS | TEMPERATURE: 97.9 F | DIASTOLIC BLOOD PRESSURE: 74 MMHG

## 2024-06-03 DIAGNOSIS — S42.91XA CLOSED FRACTURE OF RIGHT SHOULDER, INITIAL ENCOUNTER: ICD-10-CM

## 2024-06-03 DIAGNOSIS — S82.892A ANKLE FRACTURE, LEFT, CLOSED, INITIAL ENCOUNTER: ICD-10-CM

## 2024-06-03 PROCEDURE — 99284 EMERGENCY DEPT VISIT MOD MDM: CPT | Mod: 25

## 2024-06-03 PROCEDURE — 250N000013 HC RX MED GY IP 250 OP 250 PS 637: Performed by: STUDENT IN AN ORGANIZED HEALTH CARE EDUCATION/TRAINING PROGRAM

## 2024-06-03 PROCEDURE — 23600 CLTX PROX HUMRL FX W/O MNPJ: CPT | Mod: RT

## 2024-06-03 PROCEDURE — 73030 X-RAY EXAM OF SHOULDER: CPT | Mod: RT

## 2024-06-03 PROCEDURE — 29515 APPLICATION SHORT LEG SPLINT: CPT | Mod: LT

## 2024-06-03 PROCEDURE — 73610 X-RAY EXAM OF ANKLE: CPT | Mod: LT

## 2024-06-03 RX ORDER — ACETAMINOPHEN 325 MG/1
650 TABLET ORAL ONCE
Status: COMPLETED | OUTPATIENT
Start: 2024-06-03 | End: 2024-06-03

## 2024-06-03 RX ADMIN — ACETAMINOPHEN 650 MG: 325 TABLET ORAL at 21:17

## 2024-06-03 ASSESSMENT — COLUMBIA-SUICIDE SEVERITY RATING SCALE - C-SSRS
2. HAVE YOU ACTUALLY HAD ANY THOUGHTS OF KILLING YOURSELF IN THE PAST MONTH?: NO
1. IN THE PAST MONTH, HAVE YOU WISHED YOU WERE DEAD OR WISHED YOU COULD GO TO SLEEP AND NOT WAKE UP?: NO
6. HAVE YOU EVER DONE ANYTHING, STARTED TO DO ANYTHING, OR PREPARED TO DO ANYTHING TO END YOUR LIFE?: NO

## 2024-06-03 NOTE — Clinical Note
Verena Lagunas was seen and treated in our emergency department on 6/3/2024.  She may return to work on 06/17/2024.       If you have any questions or concerns, please don't hesitate to call.      Ohl, Davidson Sharpe, DO

## 2024-06-04 NOTE — DISCHARGE INSTRUCTIONS
Please take 500mg of tylenol every 4 hours as well as 600mg of ibuprofen every 6 hours for pain. Do not take more than 4,000mg of tylenol in 24hrs.    Please call orthopedics tomorrow for follow-up this week.

## 2024-06-04 NOTE — TELEPHONE ENCOUNTER
Reason for Call:  Appointment Request    Patient requesting this type of appt: Chronic Diease Management/Medication/Follow-Up    Requested provider: Tomasz Beltran    Reason patient unable to be scheduled: Not within requested timeframe    When does patient want to be seen/preferred time: 3-7 days    Comments: Patient would like to be seen prior to her current scheduled apt for her follow-up regarding her medication     Could we send this information to you in [facility] or would you prefer to receive a phone call?:  Leave message in [employer]   Okay to leave a detailed message?: n/a    Call taken on 5/31/2024 at 8:34 AM by Delmy Park  
Called pt and left VM with relay caller  
Called to ask patient if she can do a virtual appointment today at 11:20 am with Dr Beltran.     If patient calls back, the slot is being held on Dr Beltran's schedule.  
OK per Maira Farrell to use  Dr. Beltran [facility] follow-up on 6/14.  
Released appointment.to see if another patient could take it  
negative

## 2024-06-04 NOTE — ED PROVIDER NOTES
Emergency Department Encounter         FINAL IMPRESSION:  Humerus fracture, ankle fracture           ED COURSE AND MEDICAL DECISION MAKING       ED Course as of 06/03/24 2334   Mon Jun 03, 2024 2102 Patient is a deaf 6-year-old on blood thinners for A-fib.  Very mechanical fall.  Patient is a told triage nurse that she fell and hit her head and trauma alert was called.  When I saw her, she now is denying any head injury.  Planing of some neck stiffness with no midline pain but also with a right arm/shoulder pain.  As well as left ankle pain.  On examination shows decreased range of motion of the right shoulder.  Decreased range of motion of left ankle.  Normal left upper extremity as well as right lower extremity.  No abdominal pain.  No chest pain or trouble breathing.  Patient denies any back pain.  She has no obvious head or neck trauma.  Recommending CT head and neck as patient's age as well as thinner history would be high risk.  Patient is declining/refusing imaging of her head and neck for an unknown reason.  Would like just x-rays of her right shoulder and left ankle.  Explained that because of her blood thinner status as well as a fall, normal protocol be imaging of her head and neck and she still is declining.          - xr showing proximal humerus fracture  - xr showing potential L distal fib fracture  -pt placed in splint for leg and sling for arm as well as orthopedic follow-up this week.  Patient went home with her  who can help her with ADLs as well as ambulation.                Medical Decision Making  Obtained supplemental history:Supplemental history obtained?: No  Reviewed external records: External records reviewed?: No  Care impacted by chronic illness:Anticoagulated State, Hyperlipidemia, Hypertension, and Other: atrial fibrillation, chronic systolic heart failure  Care significantly affected by social determinants of health:N/A  Did you consider but not order tests?: Work up  considered but not performed and documented in chart, if applicable  Did you interpret images independently?: Independent interpretation of ECG and images noted in documentation, when applicable.  Consultation discussion with other provider:Did you involve another provider (consultant, , pharmacy, etc.)?: No  Discharge. I prescribed additional prescription strength medication(s) as charted. See documentation for any additional details.              Critical Care     Performed by: Davidson Khanna or    Authorized by: Davidson Khanna  Total critical care time:  minutes  Critical care was necessary to treat or prevent imminent or life-threatening deterioration of the following conditions:   Critical care was time spent personally by me on the following activities: development of treatment plan with patient or surrogate, discussions with consultants, examination of patient, evaluation of patient's response to treatment, obtaining history from patient or surrogate, ordering and performing treatments and interventions, ordering and review of laboratory studies, ordering and review of radiographic studies, re-evaluation of patient's condition and monitoring for potential decompensation.  Critical care time was exclusive of separately billable procedures and treating other patients.'    At the conclusion of the encounter I discussed the results of all the tests and the disposition. The questions were answered. The patient or family acknowledged understanding and was agreeable with the care plan.        MEDICATIONS GIVEN IN THE EMERGENCY DEPARTMENT:  Medications   acetaminophen (TYLENOL) tablet 650 mg (650 mg Oral $Given 6/3/24 211)       NEW PRESCRIPTIONS STARTED AT TODAY'S ED VISIT:  New Prescriptions    No medications on file       HPI     Patient information obtained from: patient    Use of : American sign language    Verena Lagunas is a 69 year old female with a pertinent history of hyperlipidemia, hypertension, atrial  fibrillation on anticoagulation, chronic systolic heart failure, who presents to this ED via walk-in for evaluation of fall.    A few hours ago, the patient lost balance and fell, denying any head injury. She complains of some neck stiffness with no midline pain, as well as right arm and shoulder pain and left ankle pain.  She denies abdominal pain, chest pain, trouble breathing, and back pain.      MEDICAL HISTORY     Past Medical History:   Diagnosis Date    Allergic rhinitis     Hypertension     Tobacco abuse        Past Surgical History:   Procedure Laterality Date    EP ABLATION PULMONARY VEIN ISOLATION N/A 2023    Procedure: Ablation Atrial Fibrillation;  Surgeon: Vandana Briscoe MD;  Location: [facility]    OVARIAN CYST REMOVAL Right        Social History     Tobacco Use    Smoking status: Former     Current packs/day: 0.00     Types: Cigarettes     Quit date: 2023     Years since quittin.0     Passive exposure: Never    Smokeless tobacco: Never   Vaping Use    Vaping status: Never Used   Substance Use Topics    Alcohol use: No     Comment: Alcoholic Drinks/day: rare    Drug use: No       albuterol (PROAIR HFA/PROVENTIL HFA/VENTOLIN HFA) 108 (90 Base) MCG/ACT inhaler  apixaban ANTICOAGULANT (ELIQUIS ANTICOAGULANT) 5 MG tablet  atorvastatin (LIPITOR) 20 MG tablet  cholecalciferol, vitamin D3, (VITAMIN D3) 1,000 unit capsule  furosemide (LASIX) 20 MG tablet  losartan (COZAAR) 50 MG tablet  metoprolol succinate ER (TOPROL XL) 100 MG 24 hr tablet            PHYSICAL EXAM     /72   Pulse 100   Temp 97.9  F (36.6  C) (Oral)   Resp 20   Wt 72.9 kg (160 lb 11.2 oz)   LMP  (LMP Unknown)   SpO2 92%   BMI 26.58 kg/m        PHYSICAL EXAM:     General: Patient appears well, nontoxic, comfortable  HEENT: Moist mucous membranes,. No obvious head or neck trauma.  No midline neck pain  Cardiovascular: Normal rate, normal rhythm, no extremity edema.  No appreciable  murmur.  Respiratory: No signs of respiratory distress, lungs are clear to auscultation bilaterally with no wheezes rhonchi or rales.  Abdominal: Soft, nontender, nondistended, no palpable masses, no guarding, no rebound  Musculoskeletal: no deformities appreciated. Decreased range of motion of the right shoulder. Decreased range of motion of the left ankle. Normal left upper extremity and right lower extremity.  Neurological: Alert and oriented, grossly neurologically intact.  Psychological: Normal affect and mood.  Integument: No rashes appreciated        RESULTS       Labs Ordered and Resulted from Time of ED Arrival to Time of ED Departure - No data to display    XR Ankle Left G/E 3 Views   Final Result   IMPRESSION: Moderate-to-severe lateral malleolus left ankle soft tissue swelling. Suggestion of nondisplaced fracture at the inferior distal fibular tip. No definitive medial or posterior malleolus fracture. Intact-appearing ankle mortise and distal    syndesmosis. Tibiotalar joint space is not significantly narrowed.      NOTE: ABNORMAL REPORT      THE DICTATION ABOVE DESCRIBES AN ABNORMALITY FOR WHICH FOLLOW-UP IS NEEDED.      XR Shoulder Right 2 Views   Final Result   IMPRESSION: Anatomic alignment right shoulder. Findings consistent with mildly displaced right proximal humerus fracture centered at the humeral neck. Equivocal extension into the greater tuberosity. No AC separation or glenohumeral joint dislocation.    Degenerative change at the AC joint. The glenohumeral joint space is not well profiled. Diffuse bone demineralization.      NOTE: ABNORMAL REPORT      THE DICTATION ABOVE DESCRIBES AN ABNORMALITY FOR WHICH FOLLOW-UP IS NEEDED.             PROCEDURES:  Procedures:  Procedures       I, Jaswant Justin am serving as a scribe to document services personally performed by Davidson Khanna DO, based on my observations and the provider's statements to me.  I, Davidson Khanna DO, attest that Jaswant Justin is acting  in a scribe capacity, has observed my performance of the services and has documented them in accordance with my direction.    Davidson Khanna DO  Emergency Medicine  [facility] EMERGENCY DEPARTMENT       Clemencia, Davidson Sharpe DO  06/04/24 2104

## 2024-06-04 NOTE — ED TRIAGE NOTES
Patient lost balance and fell, hitting head around 6pm. Patient tried to get up, Thinks she may have hurt herself more. Hit right shoulder and left ankle. Needed help getting up because of so much pain. Patient reports taking a blood thinner daily. Fell trouble with balance for some time. No N/V, CP/SOB, headache. Following the fall, maybe an hour ago patient use inhaler for some SOB. Sore neck, no midline tenderness or back pain. Trauma alert called. Provider eval in triage, patient telling provider she did not hit her head and states there was miscommunication. Patient is refusing imaging of the head. Would only like imaging of shoulder and ankle. Has not taken anything for pain.

## 2024-06-05 ENCOUNTER — PATIENT OUTREACH (OUTPATIENT)
Dept: INTERNAL MEDICINE | Facility: CLINIC | Age: 69
End: 2024-06-05
Payer: COMMERCIAL

## 2024-06-05 NOTE — TELEPHONE ENCOUNTER
RN called [facility]  services to request . RN was instructed to call patient phone number on file as they have an assistive device for telephone calls.     LVM to reach out to patient for hospital follow up. Provided CB number.

## 2024-06-06 RX ORDER — IBUPROFEN 600 MG/1
600 TABLET, FILM COATED ORAL EVERY 6 HOURS PRN
COMMUNITY
End: 2024-07-17

## 2024-06-06 RX ORDER — ACETAMINOPHEN 500 MG
500-1000 TABLET ORAL EVERY 6 HOURS PRN
COMMUNITY

## 2024-06-06 NOTE — TELEPHONE ENCOUNTER
Attempted to contact patient for TCM Outreach follow up from ER visit on 6/3. No answer. Maximum hospital outreach attempts completed. Left message to return call when available for follow up.

## 2024-06-06 NOTE — TELEPHONE ENCOUNTER
Transitions of Care Outreach via  # [id number]  Chief Complaint   Patient presents with    Hospital F/U     6/3/2024   [facility] Emergency Department         Most Recent Admission Date: 6/3/2024   Most Recent Admission Diagnosis:      Most Recent Discharge Date: 6/3/2024   Most Recent Discharge Diagnosis: Ankle fracture, left, closed, initial encounter - S82.892A  Closed fracture of right shoulder, initial encounter - S42.91XA     Transitions of Care Assessment    Discharge Assessment  How are you doing now that you are home?: It's very hard for me because I can't wash my face, brush my teeth. there are a lot of things I can't do for myself with my left hand. It's been a struggle since Monday and I told my  you gotta help me. I am hoping my  will do some things for me. I have been taking tylenol 500mg.  How are your symptoms? (Red Flag symptoms escalate to triage hotline per guidelines): Unchanged (right arm pain)  Do you know how to contact your clinic care team if you have future questions or changes to your health status? : Yes  Does the patient have their discharge instructions? : Yes  Does the patient have questions regarding their discharge instructions? : No  Were you started on any new medications or were there changes to any of your previous medications? : Yes  Does the patient have all of their medications?: Yes  Do you have questions regarding any of your medications? : No  Do you have all of your needed medical supplies or equipment (DME)?  (i.e. oxygen tank, CPAP, cane, etc.): Yes    Follow up Plan     Discharge Follow-Up  Discharge follow up appointment scheduled in alignment with recommended follow up timeframe or Transitions of Risk Category? (Low = within 30 days; Moderate= within 14 days; High= within 7 days): Yes  Discharge Follow Up Appointment Date: 06/07/24  Discharge Follow Up Appointment Scheduled with?: Specialty Care Provider    Future  Appointments   Date Time Provider Department Center   7/17/2024  3:20 PM Tomasz Beltran MD [facility]       Outpatient Plan as outlined on AVS reviewed with patient.    For any urgent concerns, please contact our 24 hour nurse triage line: [phone] ([phone])       Ivy Lucero RN       Discussed correct dosing of acetaminophen and ibuprofen. Patient is going to discuss pain management with her orthopedic doctor tomorrow.

## 2024-06-07 ENCOUNTER — TRANSFERRED RECORDS (OUTPATIENT)
Dept: HEALTH INFORMATION MANAGEMENT | Facility: CLINIC | Age: 69
End: 2024-06-07
Payer: COMMERCIAL

## 2024-06-13 ENCOUNTER — DOCUMENTATION ONLY (OUTPATIENT)
Dept: EMERGENCY MEDICINE | Facility: HOSPITAL | Age: 69
End: 2024-06-13
Payer: COMMERCIAL

## 2024-06-13 DIAGNOSIS — S82.892A CLOSED FRACTURE OF LEFT ANKLE, INITIAL ENCOUNTER: Primary | ICD-10-CM

## 2024-06-24 ENCOUNTER — TRANSFERRED RECORDS (OUTPATIENT)
Dept: HEALTH INFORMATION MANAGEMENT | Facility: CLINIC | Age: 69
End: 2024-06-24
Payer: COMMERCIAL

## 2024-07-08 ENCOUNTER — DOCUMENTATION ONLY (OUTPATIENT)
Dept: EMERGENCY MEDICINE | Facility: HOSPITAL | Age: 69
End: 2024-07-08
Payer: COMMERCIAL

## 2024-07-08 DIAGNOSIS — S82.892A CLOSED FRACTURE OF LEFT ANKLE, INITIAL ENCOUNTER: Primary | ICD-10-CM

## 2024-07-17 ENCOUNTER — OFFICE VISIT (OUTPATIENT)
Dept: INTERNAL MEDICINE | Facility: CLINIC | Age: 69
End: 2024-07-17
Payer: COMMERCIAL

## 2024-07-17 VITALS
HEART RATE: 95 BPM | RESPIRATION RATE: 18 BRPM | DIASTOLIC BLOOD PRESSURE: 71 MMHG | SYSTOLIC BLOOD PRESSURE: 117 MMHG | OXYGEN SATURATION: 94 % | TEMPERATURE: 99.6 F

## 2024-07-17 DIAGNOSIS — S42.201D CLOSED FRACTURE OF PROXIMAL END OF RIGHT HUMERUS WITH ROUTINE HEALING, UNSPECIFIED FRACTURE MORPHOLOGY, SUBSEQUENT ENCOUNTER: Primary | ICD-10-CM

## 2024-07-17 DIAGNOSIS — I48.91 ATRIAL FIBRILLATION WITH RVR (H): ICD-10-CM

## 2024-07-17 DIAGNOSIS — I50.22 CHRONIC SYSTOLIC HEART FAILURE (H): ICD-10-CM

## 2024-07-17 DIAGNOSIS — M81.0 AGE-RELATED OSTEOPOROSIS WITHOUT CURRENT PATHOLOGICAL FRACTURE: ICD-10-CM

## 2024-07-17 DIAGNOSIS — S82.832D CLOSED FRACTURE OF DISTAL END OF LEFT FIBULA WITH ROUTINE HEALING, UNSPECIFIED FRACTURE MORPHOLOGY, SUBSEQUENT ENCOUNTER: ICD-10-CM

## 2024-07-17 DIAGNOSIS — I34.2 NONRHEUMATIC MITRAL VALVE STENOSIS: ICD-10-CM

## 2024-07-17 PROCEDURE — T1013 SIGN LANG/ORAL INTERPRETER: HCPCS | Mod: U3

## 2024-07-17 PROCEDURE — G2211 COMPLEX E/M VISIT ADD ON: HCPCS | Performed by: INTERNAL MEDICINE

## 2024-07-17 PROCEDURE — 99214 OFFICE O/P EST MOD 30 MIN: CPT | Performed by: INTERNAL MEDICINE

## 2024-07-17 RX ORDER — ALENDRONATE SODIUM 70 MG/1
70 TABLET ORAL
Qty: 12 TABLET | Refills: 4 | Status: SHIPPED | OUTPATIENT
Start: 2024-07-17

## 2024-07-17 NOTE — PROGRESS NOTES
Office Visit - Follow Up   Verena Lagunas   69 year old female    Date of Visit: 7/17/2024    Chief Complaint   Patient presents with    Follow Up     Follow up -pt thinks its for Afib. Pt says she lost her balance and fell. Believes medication is causing the balance issues. Medication-Losartan is making her hungry.    Recheck Medication     Refills and renewed authorization needed per pharmacy.        Assessment and Plan   1. Closed fracture of proximal end of right humerus with routine healing, unspecified fracture morphology, subsequent encounter  2. Closed fracture of distal end of left fibula with routine healing, unspecified fracture morphology, subsequent encounter  Continue management per orthopedic specialist at [facility]    3. Age-related osteoporosis without current pathological fracture  She is now interested in osteoporosis treatment.  She will make sure to get adequate calcium and vitamin D, weightbearing exercise and Fosamax once weekly.  Discuss risk-benefit of medication since she has no upcoming dental work planned.  - calcium carbonate-vitamin D (OSCAL) 500-5 MG-MCG tablet; Take 1 tablet by mouth 2 times daily  Dispense: 180 tablet; Refill: 4  - alendronate (FOSAMAX) 70 MG tablet; Take 1 tablet (70 mg) by mouth every 7 days  Dispense: 12 tablet; Refill: 4  - DX Bone Density; Future    4. Atrial fibrillation with RVR (H)  - apixaban ANTICOAGULANT (ELIQUIS ANTICOAGULANT) 5 MG tablet; Take 1 tablet (5 mg) by mouth 2 times daily  Dispense: 180 tablet; Refill: 4    5. Nonrheumatic mitral valve stenosis  She will follow-up annually with cardiology and has an echocardiogram planned for that time    6. Chronic systolic heart failure (H)  Her ejection fraction is near normal, continue same    Return in about 3 months (around 10/17/2024) for Follow up.     History of Present Illness   This 69 year old woman comes in for follow-up.  She had a mechanical fall on Judi 3.  She broke her arm and her  fibula.  She is being managed at [facility] nonoperatively.  She does have known osteoporosis, diagnosed in 2018.  At that time she did not want to start therapy/pharmacologic therapy because of some dental work.  This dental work is now done.  We had discussed osteoporosis therapy subsequent to that visit as well and she had not wanted to start anything.  She is now interested in starting therapy.  She did have a recent echocardiogram which showed some mitral stenosis but also normalization of her ejection fraction.  She is following closely with cardiology.       Physical Exam   General Appearance:   No acute distress    /71 (BP Location: Left arm, Patient Position: Sitting, Cuff Size: Adult Regular)   Pulse 95   Temp 99.6  F (37.6  C) (Tympanic)   Resp 18   LMP  (LMP Unknown)   SpO2 94%     Her right arm is in a sling left foot and ankle are in a boot     Additional Information   Current Outpatient Medications   Medication Sig Dispense Refill    albuterol (PROAIR HFA/PROVENTIL HFA/VENTOLIN HFA) 108 (90 Base) MCG/ACT inhaler Inhale 2 puffs into the lungs every 6 hours as needed for shortness of breath, wheezing or cough 18 g 11    alendronate (FOSAMAX) 70 MG tablet Take 1 tablet (70 mg) by mouth every 7 days 12 tablet 4    apixaban ANTICOAGULANT (ELIQUIS ANTICOAGULANT) 5 MG tablet Take 1 tablet (5 mg) by mouth 2 times daily 180 tablet 4    atorvastatin (LIPITOR) 20 MG tablet TAKE 1 TABLET(20 MG) BY MOUTH DAILY 90 tablet 3    calcium carbonate-vitamin D (OSCAL) 500-5 MG-MCG tablet Take 1 tablet by mouth 2 times daily 180 tablet 4    losartan (COZAAR) 50 MG tablet TAKE 1 TABLET(50 MG) BY MOUTH DAILY 90 tablet 1    metoprolol succinate ER (TOPROL XL) 100 MG 24 hr tablet Take 1 tablet (100 mg) by mouth daily 90 tablet 3    acetaminophen (TYLENOL) 500 MG tablet Take 500-1,000 mg by mouth every 6 hours as needed for mild pain      cholecalciferol, vitamin D3, (VITAMIN D3) 1,000 unit capsule  [CHOLECALCIFEROL, VITAMIN D3, (VITAMIN D3) 1,000 UNIT CAPSULE] Take 1 capsule (1,000 Units total) by mouth daily.  0       Time:     The longitudinal plan of care for the diagnosis(es)/condition(s) as documented were addressed during this visit. Due to the added complexity in care, I will continue to support Verena in the subsequent management and with ongoing continuity of care.     Tomasz Beltran MD  Answers submitted by the patient for this visit:  General Questionnaire (Submitted on 7/17/2024)  Chief Complaint: Chronic problems general questions HPI Form  What is the reason for your visit today? : Afib  How many servings of fruits and vegetables do you eat daily?: 2-3  On average, how many sweetened beverages do you drink each day (Examples: soda, juice, sweet tea, etc.  Do NOT count diet or artificially sweetened beverages)?: 0  How many minutes a day do you exercise enough to make your heart beat faster?: 9 or less  How many days a week do you exercise enough to make your heart beat faster?: 3 or less  How many days per week do you miss taking your medication?: 0

## 2024-07-22 ENCOUNTER — TRANSFERRED RECORDS (OUTPATIENT)
Dept: HEALTH INFORMATION MANAGEMENT | Facility: CLINIC | Age: 69
End: 2024-07-22
Payer: COMMERCIAL

## 2024-07-29 DIAGNOSIS — I10 ESSENTIAL HYPERTENSION: ICD-10-CM

## 2024-07-29 RX ORDER — LOSARTAN POTASSIUM 50 MG/1
50 TABLET ORAL DAILY
Qty: 90 TABLET | Refills: 2 | Status: SHIPPED | OUTPATIENT
Start: 2024-07-29

## 2024-07-30 NOTE — TELEPHONE ENCOUNTER
Rosanna Teran is a 75 year old female here for  Chief Complaint   Patient presents with    Follow-up     CLL (chronic lymphocytic leukemia)     Denies latex allergy or sensitivity.    Medication verified and med list updated.  PCP and Pharmacy verified.    Social History     Tobacco Use   Smoking Status Never   Smokeless Tobacco Never     Advance Directives Filed: No    ECOG:   ECOG [07/30/24 1350]   ECOG Performance Status 1       Vitals:    Visit Vitals  /60 Comment: MD aware   Pulse 64   Temp 97.8 °F (36.6 °C) (Oral)   Resp 16   Wt 57.1 kg (125 lb 14.4 oz)   SpO2 100%   BMI 22.48 kg/m²       These vital signs are:  Not within defined parameters:  The following abnormal VS were verbally communicated to MD.    Height: No.  Ht Readings from Last 1 Encounters:   10/10/23 5' 2.75\" (1.594 m)     Weight:Yes, shoes on.  Wt Readings from Last 3 Encounters:   07/30/24 57.1 kg (125 lb 14.4 oz)   04/23/24 56.6 kg (124 lb 11.2 oz)   03/04/24 54.9 kg (121 lb)       BMI: Body mass index is 22.48 kg/m².    REVIEW OF SYSTEMS  GENERAL:  Patient denies headache, fevers, chills, night sweats, excessive fatigue, change in appetite, weight loss, dizziness  MUSCULOSKELETAL:  Patient denies joint pain, bone pain, joint swelling, redness, decreased range of motion  SKIN:  Patient denies chronic rashes, inflammation, ulcerations, skin changes, itching  NEUROLOGIC:  Patient denies loss of balance, areas of focal weakness, abnormal gait, sensory problems, numbness, tingling  PSYCHIATRIC: Patient denies insomnia, depression, anxiety    This patient reported abnormal symptoms that needed immediate verbal communication: No     done

## 2024-08-26 ENCOUNTER — TRANSFERRED RECORDS (OUTPATIENT)
Dept: HEALTH INFORMATION MANAGEMENT | Facility: CLINIC | Age: 69
End: 2024-08-26
Payer: COMMERCIAL

## 2024-10-28 ENCOUNTER — TRANSFERRED RECORDS (OUTPATIENT)
Dept: HEALTH INFORMATION MANAGEMENT | Facility: CLINIC | Age: 69
End: 2024-10-28
Payer: COMMERCIAL

## 2025-01-16 DIAGNOSIS — R06.02 SHORTNESS OF BREATH: ICD-10-CM

## 2025-01-16 RX ORDER — ALBUTEROL SULFATE 90 UG/1
INHALANT RESPIRATORY (INHALATION)
Qty: 6.7 G | Refills: 11 | Status: SHIPPED | OUTPATIENT
Start: 2025-01-16

## 2025-01-25 DIAGNOSIS — E78.2 MIXED HYPERLIPIDEMIA: ICD-10-CM

## 2025-01-27 RX ORDER — ATORVASTATIN CALCIUM 20 MG/1
20 TABLET, FILM COATED ORAL DAILY
Qty: 90 TABLET | Refills: 1 | Status: SHIPPED | OUTPATIENT
Start: 2025-01-27

## 2025-02-27 ENCOUNTER — OFFICE VISIT (OUTPATIENT)
Dept: INTERNAL MEDICINE | Facility: CLINIC | Age: 70
End: 2025-02-27
Payer: COMMERCIAL

## 2025-02-27 VITALS
TEMPERATURE: 98.7 F | HEIGHT: 65 IN | DIASTOLIC BLOOD PRESSURE: 83 MMHG | HEART RATE: 87 BPM | SYSTOLIC BLOOD PRESSURE: 138 MMHG | BODY MASS INDEX: 26.74 KG/M2 | RESPIRATION RATE: 18 BRPM | OXYGEN SATURATION: 93 %

## 2025-02-27 DIAGNOSIS — E78.2 MIXED HYPERLIPIDEMIA: ICD-10-CM

## 2025-02-27 DIAGNOSIS — R73.9 HYPERGLYCEMIA: ICD-10-CM

## 2025-02-27 DIAGNOSIS — I48.19 PERSISTENT ATRIAL FIBRILLATION (H): ICD-10-CM

## 2025-02-27 DIAGNOSIS — R07.89 RIGHT-SIDED CHEST WALL PAIN: ICD-10-CM

## 2025-02-27 DIAGNOSIS — R05.2 SUBACUTE COUGH: ICD-10-CM

## 2025-02-27 DIAGNOSIS — R06.02 SOB (SHORTNESS OF BREATH): ICD-10-CM

## 2025-02-27 DIAGNOSIS — R60.0 LOWER EXTREMITY EDEMA: Primary | ICD-10-CM

## 2025-02-27 DIAGNOSIS — Z87.891 HISTORY OF TOBACCO USE, PRESENTING HAZARDS TO HEALTH: ICD-10-CM

## 2025-02-27 DIAGNOSIS — I10 ESSENTIAL HYPERTENSION: ICD-10-CM

## 2025-02-27 LAB
ALBUMIN SERPL BCG-MCNC: 4 G/DL (ref 3.5–5.2)
ALBUMIN UR-MCNC: NEGATIVE MG/DL
ALP SERPL-CCNC: 78 U/L (ref 40–150)
ALT SERPL W P-5'-P-CCNC: 23 U/L (ref 0–50)
ANION GAP SERPL CALCULATED.3IONS-SCNC: 9 MMOL/L (ref 7–15)
APPEARANCE UR: CLEAR
AST SERPL W P-5'-P-CCNC: 25 U/L (ref 0–45)
BACTERIA #/AREA URNS HPF: ABNORMAL /HPF
BILIRUB SERPL-MCNC: 0.2 MG/DL
BILIRUB UR QL STRIP: NEGATIVE
BUN SERPL-MCNC: 18.9 MG/DL (ref 8–23)
CALCIUM SERPL-MCNC: 9.8 MG/DL (ref 8.8–10.4)
CHLORIDE SERPL-SCNC: 101 MMOL/L (ref 98–107)
CHOLEST SERPL-MCNC: 169 MG/DL
COLOR UR AUTO: YELLOW
CREAT SERPL-MCNC: 0.88 MG/DL (ref 0.51–0.95)
EGFRCR SERPLBLD CKD-EPI 2021: 70 ML/MIN/1.73M2
ERYTHROCYTE [DISTWIDTH] IN BLOOD BY AUTOMATED COUNT: 12.4 % (ref 10–15)
EST. AVERAGE GLUCOSE BLD GHB EST-MCNC: 126 MG/DL
FASTING STATUS PATIENT QL REPORTED: NO
FASTING STATUS PATIENT QL REPORTED: NO
GLUCOSE SERPL-MCNC: 101 MG/DL (ref 70–99)
GLUCOSE UR STRIP-MCNC: NEGATIVE MG/DL
HBA1C MFR BLD: 6 % (ref 0–5.6)
HCO3 SERPL-SCNC: 26 MMOL/L (ref 22–29)
HCT VFR BLD AUTO: 39.4 % (ref 35–47)
HDLC SERPL-MCNC: 65 MG/DL
HGB BLD-MCNC: 12.7 G/DL (ref 11.7–15.7)
HGB UR QL STRIP: ABNORMAL
KETONES UR STRIP-MCNC: NEGATIVE MG/DL
LDLC SERPL CALC-MCNC: 83 MG/DL
LEUKOCYTE ESTERASE UR QL STRIP: ABNORMAL
MCH RBC QN AUTO: 27.7 PG (ref 26.5–33)
MCHC RBC AUTO-ENTMCNC: 32.2 G/DL (ref 31.5–36.5)
MCV RBC AUTO: 86 FL (ref 78–100)
NITRATE UR QL: NEGATIVE
NONHDLC SERPL-MCNC: 104 MG/DL
NT-PROBNP SERPL-MCNC: 597 PG/ML (ref 0–900)
PH UR STRIP: 6 [PH] (ref 5–8)
PLATELET # BLD AUTO: 295 10E3/UL (ref 150–450)
POTASSIUM SERPL-SCNC: 5.1 MMOL/L (ref 3.4–5.3)
PROT SERPL-MCNC: 7.1 G/DL (ref 6.4–8.3)
RBC # BLD AUTO: 4.58 10E6/UL (ref 3.8–5.2)
RBC #/AREA URNS AUTO: ABNORMAL /HPF
SODIUM SERPL-SCNC: 136 MMOL/L (ref 135–145)
SP GR UR STRIP: 1.01 (ref 1–1.03)
SQUAMOUS #/AREA URNS AUTO: ABNORMAL /LPF
TRIGL SERPL-MCNC: 104 MG/DL
TSH SERPL DL<=0.005 MIU/L-ACNC: 1.04 UIU/ML (ref 0.3–4.2)
UROBILINOGEN UR STRIP-ACNC: 0.2 E.U./DL
WBC # BLD AUTO: 8.8 10E3/UL (ref 4–11)
WBC #/AREA URNS AUTO: ABNORMAL /HPF

## 2025-02-27 RX ORDER — METOPROLOL SUCCINATE 100 MG/1
100 TABLET, EXTENDED RELEASE ORAL DAILY
Qty: 90 TABLET | Refills: 4 | Status: SHIPPED | OUTPATIENT
Start: 2025-02-27

## 2025-02-27 RX ORDER — FUROSEMIDE 20 MG/1
20 TABLET ORAL DAILY
Qty: 90 TABLET | Refills: 4 | Status: SHIPPED | OUTPATIENT
Start: 2025-02-27

## 2025-02-27 ASSESSMENT — PAIN SCALES - GENERAL: PAINLEVEL_OUTOF10: NO PAIN (0)

## 2025-02-27 NOTE — PROGRESS NOTES
Office Visit - Follow Up   Verena Lagunas   70 year old female    Date of Visit: 2/27/2025    Chief Complaint   Patient presents with    Follow Up    Recheck Medication     Pt reports that she's here to follow from rt broken shoulder and rt ankle. Pt reports that she's feels like she should continue therapy.        Assessment and Plan   1. Lower extremity edema (Primary)  I would like her to resume furosemide and will check labs and x-ray as below.  Further evaluation depending on findings.  - furosemide (LASIX) 20 MG tablet; Take 1 tablet (20 mg) by mouth daily.  Dispense: 90 tablet; Refill: 4    2. Subacute cough  She is no longer smoking  - XR Chest 2 Views; Future    3. Right-sided chest wall pain  - XR Chest 2 Views; Future    4. SOB (shortness of breath)  - BNP-N terminal pro; Future    5. Persistent atrial fibrillation (H)  She appears to be in sinus rhythm, continue metoprolol and Eliquis for stroke prevention  - metoprolol succinate ER (TOPROL XL) 100 MG 24 hr tablet; Take 1 tablet (100 mg) by mouth daily.  Dispense: 90 tablet; Refill: 4    6. Mixed hyperlipidemia  Continue statin  - CBC with platelets; Future  - Comprehensive metabolic panel; Future  - UA Macroscopic with reflex to Microscopic and Culture; Future  - TSH with free T4 reflex; Future  - Lipid panel reflex to direct LDL Non-fasting; Future    7. Essential hypertension  Adding furosemide    8. History of tobacco use, presenting hazards to health  No longer smoking now for 3 years, will discuss CT lung cancer screening at her physical in 1 month    9. Hyperglycemia  - Hemoglobin A1c; Future    Return in about 4 weeks (around 3/27/2025) for Routine preventive.     History of Present Illness   This 70 year old woman comes in and is seen with use of an ASL through .  She has been noticing weight gain and swelling in her legs.  She has had a cough and some dyspnea and now a right-sided posterior chest pain with the coughing.    "    Physical Exam   General Appearance:   No acute distress    /83 (BP Location: Left arm, Patient Position: Sitting, Cuff Size: Adult Regular)   Pulse 87   Temp 98.7  F (37.1  C) (Tympanic)   Resp 18   Ht 1.651 m (5' 5\")   LMP  (LMP Unknown)   SpO2 93%   Breastfeeding No   BMI 26.74 kg/m      Heart rate controlled rhythm regular lungs clear to auscultation bilaterally.  She does have edema in the lateral lower extremities but also appears to have some caloric weight gain.     Additional Information   Current Outpatient Medications   Medication Sig Dispense Refill    acetaminophen (TYLENOL) 500 MG tablet Take 500-1,000 mg by mouth every 6 hours as needed for mild pain      albuterol (PROAIR HFA/PROVENTIL HFA/VENTOLIN HFA) 108 (90 Base) MCG/ACT inhaler INHALE 2 PUFFS INTO THE LUNGS EVERY 6 HOURS AS NEEDED FOR SHORTNESS OF BREATH OR WHEEZING OR COUGH 6.7 g 11    apixaban ANTICOAGULANT (ELIQUIS ANTICOAGULANT) 5 MG tablet Take 1 tablet (5 mg) by mouth 2 times daily 180 tablet 4    atorvastatin (LIPITOR) 20 MG tablet TAKE 1 TABLET(20 MG) BY MOUTH DAILY 90 tablet 1    calcium carbonate-vitamin D (OSCAL) 500-5 MG-MCG tablet Take 1 tablet by mouth 2 times daily 180 tablet 4    cholecalciferol, vitamin D3, (VITAMIN D3) 1,000 unit capsule [CHOLECALCIFEROL, VITAMIN D3, (VITAMIN D3) 1,000 UNIT CAPSULE] Take 1 capsule (1,000 Units total) by mouth daily.  0    furosemide (LASIX) 20 MG tablet Take 1 tablet (20 mg) by mouth daily. 90 tablet 4    losartan (COZAAR) 50 MG tablet Take 1 tablet (50 mg) by mouth daily 90 tablet 2    metoprolol succinate ER (TOPROL XL) 100 MG 24 hr tablet Take 1 tablet (100 mg) by mouth daily. 90 tablet 4    alendronate (FOSAMAX) 70 MG tablet Take 1 tablet (70 mg) by mouth every 7 days (Patient not taking: Reported on 2/27/2025) 12 tablet 4       Time:     The longitudinal plan of care for the diagnosis(es)/condition(s) as documented were addressed during this visit. Due to the added " complexity in care, I will continue to support Verena in the subsequent management and with ongoing continuity of care.     Tomasz Beltran MD

## 2025-03-13 ENCOUNTER — HOSPITAL ENCOUNTER (OUTPATIENT)
Dept: CT IMAGING | Facility: HOSPITAL | Age: 70
Discharge: HOME | End: 2025-03-13
Attending: INTERNAL MEDICINE
Payer: COMMERCIAL

## 2025-03-13 DIAGNOSIS — R91.8 PULMONARY NODULES: ICD-10-CM

## 2025-03-13 PROCEDURE — 71250 CT THORAX DX C-: CPT

## 2025-03-13 PROCEDURE — T1013 SIGN LANG/ORAL INTERPRETER: HCPCS | Mod: U3

## 2025-03-31 ENCOUNTER — HOSPITAL ENCOUNTER (OUTPATIENT)
Dept: CARDIOLOGY | Facility: HOSPITAL | Age: 70
Discharge: HOME | End: 2025-03-31
Attending: INTERNAL MEDICINE | Admitting: INTERNAL MEDICINE
Payer: COMMERCIAL

## 2025-03-31 DIAGNOSIS — I50.22 CHRONIC SYSTOLIC HEART FAILURE (H): ICD-10-CM

## 2025-03-31 DIAGNOSIS — I42.8 OTHER CARDIOMYOPATHY (H): ICD-10-CM

## 2025-03-31 LAB — LVEF ECHO: NORMAL

## 2025-03-31 PROCEDURE — 93306 TTE W/DOPPLER COMPLETE: CPT

## 2025-03-31 PROCEDURE — 93306 TTE W/DOPPLER COMPLETE: CPT | Mod: 26 | Performed by: INTERNAL MEDICINE

## 2025-03-31 PROCEDURE — T1013 SIGN LANG/ORAL INTERPRETER: HCPCS | Mod: U3

## 2025-04-02 ENCOUNTER — OFFICE VISIT (OUTPATIENT)
Dept: INTERNAL MEDICINE | Facility: CLINIC | Age: 70
End: 2025-04-02
Payer: COMMERCIAL

## 2025-04-02 VITALS
WEIGHT: 183 LBS | BODY MASS INDEX: 30.49 KG/M2 | HEIGHT: 65 IN | DIASTOLIC BLOOD PRESSURE: 62 MMHG | HEART RATE: 94 BPM | TEMPERATURE: 98 F | SYSTOLIC BLOOD PRESSURE: 124 MMHG | OXYGEN SATURATION: 94 %

## 2025-04-02 DIAGNOSIS — H91.93 BILATERAL DEAFNESS: ICD-10-CM

## 2025-04-02 DIAGNOSIS — J41.0 SIMPLE CHRONIC BRONCHITIS (H): ICD-10-CM

## 2025-04-02 DIAGNOSIS — I48.19 PERSISTENT ATRIAL FIBRILLATION (H): ICD-10-CM

## 2025-04-02 DIAGNOSIS — E78.2 MIXED HYPERLIPIDEMIA: ICD-10-CM

## 2025-04-02 DIAGNOSIS — I87.8 VENOUS STASIS OF LOWER EXTREMITY: ICD-10-CM

## 2025-04-02 DIAGNOSIS — M81.0 AGE-RELATED OSTEOPOROSIS WITHOUT CURRENT PATHOLOGICAL FRACTURE: ICD-10-CM

## 2025-04-02 DIAGNOSIS — K21.9 GASTROESOPHAGEAL REFLUX DISEASE WITHOUT ESOPHAGITIS: ICD-10-CM

## 2025-04-02 DIAGNOSIS — Z12.11 SCREEN FOR COLON CANCER: ICD-10-CM

## 2025-04-02 DIAGNOSIS — Z12.31 VISIT FOR SCREENING MAMMOGRAM: ICD-10-CM

## 2025-04-02 DIAGNOSIS — E55.9 VITAMIN D DEFICIENCY: ICD-10-CM

## 2025-04-02 DIAGNOSIS — Z00.00 ANNUAL PHYSICAL EXAM: Primary | ICD-10-CM

## 2025-04-02 DIAGNOSIS — I10 ESSENTIAL HYPERTENSION: ICD-10-CM

## 2025-04-02 DIAGNOSIS — I34.2 NONRHEUMATIC MITRAL VALVE STENOSIS: ICD-10-CM

## 2025-04-02 DIAGNOSIS — I50.22 CHRONIC SYSTOLIC HEART FAILURE (H): ICD-10-CM

## 2025-04-02 PROBLEM — H81.09 MENIERE'S DISEASE, UNSPECIFIED LATERALITY: Status: RESOLVED | Noted: 2023-10-02 | Resolved: 2025-04-02

## 2025-04-02 PROBLEM — Z86.19 HISTORY OF CLOSTRIDIOIDES DIFFICILE COLITIS: Status: RESOLVED | Noted: 2022-10-04 | Resolved: 2025-04-02

## 2025-04-02 PROBLEM — R91.8 LUNG NODULES: Status: RESOLVED | Noted: 2017-06-07 | Resolved: 2025-04-02

## 2025-04-02 PROCEDURE — T1013 SIGN LANG/ORAL INTERPRETER: HCPCS | Mod: U3

## 2025-04-02 RX ORDER — OMEPRAZOLE 20 MG/1
20 CAPSULE, DELAYED RELEASE ORAL DAILY
Qty: 90 CAPSULE | Refills: 1 | Status: SHIPPED | OUTPATIENT
Start: 2025-04-02

## 2025-04-02 NOTE — PROGRESS NOTES
Office Visit - Physical   Verena Lagunas   70 year old  female    Date of visit: 4/2/2025  Physician: Tomasz Beltran MD     Assessment and Plan   1. Annual physical exam (Primary)  This is a 70-year-old woman with issues as discussed below.  RST and tetanus vaccine given.  She is on employer based insurance and not on Medicare.  This is an annual physical not an annual wellness exam.    2. Screen for colon cancer  She is due for screening    3. Visit for screening mammogram  Defers mammogram    4. Chronic systolic heart failure (H)  Recently had echocardiogram which shows normalization of her left ventricular ejection fraction and she will continue her current medication    5. Persistent atrial fibrillation (H), s/p ablation 2023  Continue current strategy    6. Nonrheumatic mitral valve stenosis  Stable    7. Hypertension  Pressure okay continue same    8. Mixed hyperlipidemia  Continue atorvastatin    9. Venous stasis of lower extremity  - Orthotics, Mastectomy and Custom Compression Orders Type: Compression; Custom Compression Type: Leg; Compression LegType: Knee; Compression Leg Laterality: Bilateral; Compression Leg Strength: 15/20 mmHg; Compression Leg Quantity: 12 Pair    10. Simple chronic bronchitis (H)  Is no longer smoking, reviewed recent CT mild emphysema, continue albuterol inhaler as needed    11. Gastroesophageal reflux disease without esophagitis  I suspect her cough is from reflux, trial of omeprazole  - omeprazole (PRILOSEC) 20 MG DR capsule; Take 1 capsule (20 mg) by mouth daily.  Dispense: 90 capsule; Refill: 1    12. Age-related osteoporosis without current pathological fracture  She will start Fosamax when she is done with her dental work    13. Vitamin D deficiency  Continue with vitamin D    14. Bilateral deafness  Is seen with the aid of a     The following high BMI interventions were performed this visit: encouragement to exercise and lifestyle  education regarding diet    Return in about 6 months (around 10/2/2025) for Follow up.     Chief Complaint   Chief Complaint   Patient presents with    Edema     1 month follow up - pt reports edema is still about the same    Orders     Discuss orders for compression stockings     Results     Review recent imaging reports (CT scan and xray reports); patient is still reporting coughing eps     Medication Change     Possible change in albuterol inhaler; she feels that's not working         Patient Profile   Social History     Social History Narrative    [place]  Mynor.  She works for the [employer] in the [employer], .  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  No children.          Past Medical History   Patient Active Problem List   Diagnosis    Mixed hyperlipidemia    Hypertension    Age-related osteoporosis without current pathological fracture    Vitamin D deficiency    Bilateral deafness    Gastroesophageal reflux disease without esophagitis    Simple chronic bronchitis (H)    Persistent atrial fibrillation (H), s/p ablation 2023    Chronic systolic heart failure (H)    Nonrheumatic mitral valve stenosis       Past Surgical History  She has a past surgical history that includes Ovarian Cyst Removal (Right, 1970) and Ablation Atrial Fibrillation (N/A, 8/21/2023).     History of Present Illness   This 70 year old woman comes in for annual visit and follow-up.  Reviewed her echocardiogram and CT scan all look okay/stable.  A little bit of swelling in the lower extremities at the end of the day.  She has a cough worse at night when she is lying flat and is experiencing reflux symptoms.  She is not taking Fosamax because of some dental work she is having done.    Review of Systems: A comprehensive review of systems was negative except as noted.     Medications and Allergies   Current Outpatient Medications   Medication Sig Dispense  Refill    albuterol (PROAIR HFA/PROVENTIL HFA/VENTOLIN HFA) 108 (90 Base) MCG/ACT inhaler INHALE 2 PUFFS INTO THE LUNGS EVERY 6 HOURS AS NEEDED FOR SHORTNESS OF BREATH OR WHEEZING OR COUGH 6.7 g 11    apixaban ANTICOAGULANT (ELIQUIS ANTICOAGULANT) 5 MG tablet Take 1 tablet (5 mg) by mouth 2 times daily 180 tablet 4    atorvastatin (LIPITOR) 20 MG tablet TAKE 1 TABLET(20 MG) BY MOUTH DAILY 90 tablet 1    cholecalciferol, vitamin D3, (VITAMIN D3) 1,000 unit capsule [CHOLECALCIFEROL, VITAMIN D3, (VITAMIN D3) 1,000 UNIT CAPSULE] Take 1 capsule (1,000 Units total) by mouth daily.  0    furosemide (LASIX) 20 MG tablet Take 1 tablet (20 mg) by mouth daily. 90 tablet 4    losartan (COZAAR) 50 MG tablet Take 1 tablet (50 mg) by mouth daily 90 tablet 2    metoprolol succinate ER (TOPROL XL) 100 MG 24 hr tablet Take 1 tablet (100 mg) by mouth daily. 90 tablet 4    omeprazole (PRILOSEC) 20 MG DR capsule Take 1 capsule (20 mg) by mouth daily. 90 capsule 1    alendronate (FOSAMAX) 70 MG tablet Take 1 tablet (70 mg) by mouth every 7 days (Patient not taking: Reported on 2025) 12 tablet 4    calcium carbonate-vitamin D (OSCAL) 500-5 MG-MCG tablet Take 1 tablet by mouth 2 times daily 180 tablet 4     Allergies   Allergen Reactions    Cat Hair Std Allergenic Ext [Cat Dander] Unknown    Penicillins Shortness Of Breath        Family and Social History   Family History   Problem Relation Age of Onset    Colon Cancer Mother 79            Lung Cancer Father 50            No Known Problems Sister     Other - See Comments Brother         6 brothers and 1 sister    Cancer Brother     Breast Cancer Maternal Aunt 75        Social History     Tobacco Use    Smoking status: Former     Current packs/day: 0.00     Types: Cigarettes     Quit date: 2023     Years since quittin.8     Passive exposure: Never    Smokeless tobacco: Never   Vaping Use    Vaping status: Never Used   Substance Use Topics    Alcohol use: No      "Comment: Alcoholic Drinks/day: rare    Drug use: No        Physical Exam   General Appearance:   No acute distress    /62 (BP Location: Left arm, Patient Position: Sitting, Cuff Size: Adult Regular)   Pulse 94   Temp 98  F (36.7  C) (Tympanic)   Ht 1.651 m (5' 5\")   Wt 83 kg (183 lb)   LMP  (LMP Unknown)   SpO2 94%   BMI 30.45 kg/m      EYES: Eyelids, conjunctiva, and sclera were normal.   HEAD, EARS, NOSE, MOUTH, AND THROAT: Head and face were normal. Hearing was normal to voice and the ears were normal to external exam. Nose appearance was normal and there was no discharge.   NECK: Neck appearance was normal.  RESPIRATORY: Breathing pattern was normal and the chest moved symmetrically.   Lung sounds were normal and there were no abnormal sounds.  CARDIOVASCULAR: Heart rate and rhythm were normal.  S1 and S2 were normal and there were no extra sounds or murmurs.  There was mild bilateral peripheral edema.  GASTROINTESTINAL: The abdomen was normal in contour.    NEUROLOGIC: The patient was alert and oriented to person, place, time, and circumstance. Speech was normal. Cranial nerves were normal except for deafness. Motor strength was normal for age. The patient was normally coordinated.  PSYCHIATRIC:  Mood and affect were normal and the patient had normal recent and remote memory. The patient's judgment and insight were normal.       Additional Information   The longitudinal plan of care for the diagnosis(es)/condition(s) as documented were addressed during this visit. Due to the added complexity in care, I will continue to support Verena in the subsequent management and with ongoing continuity of care.       Tomasz Beltran MD  Internal Medicine  Contact me at [phone]  Answers submitted by the patient for this visit:  General Questionnaire (Submitted on 4/2/2025)  Chief Complaint: Chronic problems general questions HPI Form  What is the reason for your visit today? : follow up about fluid, weight " gain, cough  How many days per week do you miss taking your medication?: 0  Questionnaire about: Chronic problems general questions HPI Form (Submitted on 4/2/2025)  Chief Complaint: Chronic problems general questions HPI Form

## 2025-04-03 ENCOUNTER — TELEPHONE (OUTPATIENT)
Dept: INTERNAL MEDICINE | Facility: CLINIC | Age: 70
End: 2025-04-03
Payer: COMMERCIAL

## 2025-04-03 DIAGNOSIS — I89.0 LYMPHEDEMA: Primary | ICD-10-CM

## 2025-04-03 NOTE — TELEPHONE ENCOUNTER
General Call    Contacts       Contact Date/Time Type Contact Phone/Fax    04/03/2025 08:43 AM CDT Phone (Incoming) Verena Lagunas (Self) [phone] (H)          Reason for Call:  Diagnosis code for compression order     What are your questions or concerns:  Pt states that the diagnosis code on the DME order needs to be updated to Lymphedema for insurance purposes - please advise and let pt know once diagnosis code is changed so she can get supplies     Date of last appointment with provider: 04/02    Could we send this information to you in [employer] or would you prefer to receive a phone call?:   Patient would prefer a phone call   Okay to leave a detailed message?: N/A at Cell number on file:    Telephone Information:   Mobile [phone]

## 2025-04-07 NOTE — TELEPHONE ENCOUNTER
Provider please review message below on order request/change.    New order pended to review and sign with Lymphedema Dx code, thank you.

## 2025-04-08 NOTE — TELEPHONE ENCOUNTER
Attempted to call patient - left detailed message on VM that new order was placed with Dx code Lymphedema as requested. Advised to return call with any fiurther questions or concerns.

## 2025-04-28 DIAGNOSIS — I10 ESSENTIAL HYPERTENSION: ICD-10-CM

## 2025-04-28 RX ORDER — LOSARTAN POTASSIUM 50 MG/1
50 TABLET ORAL DAILY
Qty: 90 TABLET | Refills: 2 | Status: SHIPPED | OUTPATIENT
Start: 2025-04-28

## 2025-06-07 ENCOUNTER — HEALTH MAINTENANCE LETTER (OUTPATIENT)
Age: 70
End: 2025-06-07

## 2025-07-23 DIAGNOSIS — E78.2 MIXED HYPERLIPIDEMIA: ICD-10-CM

## 2025-07-23 RX ORDER — ATORVASTATIN CALCIUM 20 MG/1
20 TABLET, FILM COATED ORAL DAILY
Qty: 90 TABLET | Refills: 1 | Status: SHIPPED | OUTPATIENT
Start: 2025-07-23

## (undated) DEVICE — CATHETER OCTARAY LONG SPLINE CURVE F 3-3-3-3-3 D160906

## (undated) DEVICE — PATCH CARTO 3 EXTERNAL REFERENCE 3D MAPPING CREFP6

## (undated) DEVICE — ELECTRODE DEFIB CADENCE 22550R

## (undated) DEVICE — CATH THERMOCOOL SMARTTOUCH SF FJ CURVE

## (undated) DEVICE — INTRODUCER SHEATH VASC CATH 8.5FR CARTO GIDE STH MED D138502

## (undated) DEVICE — NEEDLE 71CM NRG TRANSSEPTAL C0  8F FIX CURVE NRG-E-HF-71-C0

## (undated) DEVICE — INTRO SHEATH 9FRX10CM PINNACLE RSS902

## (undated) DEVICE — INTRO SHEATH 8FRX10CM PINNACLE RSS802

## (undated) DEVICE — GUIDEWIRE PROTRACK PGTL .025IN DIA 23

## (undated) DEVICE — TRANSDUCER TRAY ARTERIAL 42646-06

## (undated) DEVICE — TUBE SET SMARKABLATE IRRIGATION

## (undated) DEVICE — CATH EP WOVEN PENTAPOLAR 6FR X 120CM

## (undated) DEVICE — 8F SOUNDSTAR ECO ULTRASOUND CATHETER

## (undated) DEVICE — CATH EP DECAPOLAR CS 7FR BI-DIRECTL FJ

## (undated) DEVICE — Device

## (undated) DEVICE — CUSTOM PACK EP

## (undated) RX ORDER — PROTAMINE SULFATE 10 MG/ML
INJECTION, SOLUTION INTRAVENOUS
Status: DISPENSED
Start: 2023-08-21

## (undated) RX ORDER — METHOHEXITAL IN WATER/PF 100MG/10ML
SYRINGE (ML) INTRAVENOUS
Status: DISPENSED
Start: 2023-07-17

## (undated) RX ORDER — HEPARIN SODIUM 10000 [USP'U]/100ML
INJECTION, SOLUTION INTRAVENOUS
Status: DISPENSED
Start: 2023-08-21

## (undated) RX ORDER — PHENYLEPHRINE HYDROCHLORIDE 10 MG/ML
INJECTION INTRAVENOUS
Status: DISPENSED
Start: 2023-08-21

## (undated) RX ORDER — ADENOSINE 3 MG/ML
INJECTION, SOLUTION INTRAVENOUS
Status: DISPENSED
Start: 2023-08-21

## (undated) RX ORDER — ETOMIDATE 2 MG/ML
INJECTION INTRAVENOUS
Status: DISPENSED
Start: 2023-08-21

## (undated) RX ORDER — ALBUTEROL SULFATE 0.83 MG/ML
SOLUTION RESPIRATORY (INHALATION)
Status: DISPENSED
Start: 2023-08-21

## (undated) RX ORDER — FENTANYL CITRATE-0.9 % NACL/PF 10 MCG/ML
PLASTIC BAG, INJECTION (ML) INTRAVENOUS
Status: DISPENSED
Start: 2023-08-21

## (undated) RX ORDER — METOPROLOL TARTRATE 1 MG/ML
INJECTION, SOLUTION INTRAVENOUS
Status: DISPENSED
Start: 2023-07-17

## (undated) RX ORDER — FENTANYL CITRATE 50 UG/ML
INJECTION, SOLUTION INTRAMUSCULAR; INTRAVENOUS
Status: DISPENSED
Start: 2023-08-21

## (undated) RX ORDER — HEPARIN SODIUM 1000 [USP'U]/ML
INJECTION, SOLUTION INTRAVENOUS; SUBCUTANEOUS
Status: DISPENSED
Start: 2023-08-21